# Patient Record
Sex: FEMALE | Race: WHITE | Employment: OTHER | URBAN - NONMETROPOLITAN AREA
[De-identification: names, ages, dates, MRNs, and addresses within clinical notes are randomized per-mention and may not be internally consistent; named-entity substitution may affect disease eponyms.]

---

## 2017-01-03 PROBLEM — Z80.0 FAMILY HISTORY OF COLON CANCER: Status: ACTIVE | Noted: 2017-01-03

## 2017-01-03 PROBLEM — Z12.11 COLON CANCER SCREENING: Status: ACTIVE | Noted: 2017-01-03

## 2017-10-10 ENCOUNTER — HOSPITAL ENCOUNTER (OUTPATIENT)
Age: 57
Discharge: HOME | End: 2017-10-10
Payer: COMMERCIAL

## 2017-10-10 PROCEDURE — 86003 ALLG SPEC IGE CRUDE XTRC EA: CPT

## 2017-10-10 PROCEDURE — 36415 COLL VENOUS BLD VENIPUNCTURE: CPT

## 2017-10-11 LAB
ALLERGEN HONEY BEE IGE: <0.34 KU/L (ref 0–0.34)
ALLERGEN WHITE-FACED HORNET IGE: 7.33 KU/L (ref 0–0.34)
COMMON WASP, YELLOW JACKET IGE: 14.2 KU/L (ref 0–0.34)
PAPER WASP IGE CLASS: 9.2 KU/L (ref 0–0.34)
YELLOW HORNET IGE: <0.34 KU/L (ref 0–0.34)

## 2017-10-22 ENCOUNTER — HOSPITAL ENCOUNTER (INPATIENT)
Age: 57
LOS: 1 days | Discharge: HOME | DRG: 638 | End: 2017-10-23
Attending: INTERNAL MEDICINE | Admitting: INTERNAL MEDICINE
Payer: COMMERCIAL

## 2017-10-22 DIAGNOSIS — I49.3 FREQUENT PVCS: Primary | ICD-10-CM

## 2017-10-22 PROBLEM — E10.10 DIABETIC KETOACIDOSIS WITHOUT COMA ASSOCIATED WITH TYPE 1 DIABETES MELLITUS (HCC): Status: ACTIVE | Noted: 2017-10-22

## 2017-10-22 PROBLEM — E86.0 DEHYDRATION: Status: ACTIVE | Noted: 2017-10-22

## 2017-10-22 LAB
-: ABNORMAL
ABSOLUTE EOS #: 0 K/UL (ref 0–0.4)
ABSOLUTE IMMATURE GRANULOCYTE: ABNORMAL K/UL (ref 0–0.3)
ABSOLUTE LYMPH #: 1.43 K/UL (ref 1–4.8)
ABSOLUTE MONO #: 2 K/UL (ref 0–1)
ALBUMIN SERPL-MCNC: 4.9 G/DL (ref 3.5–5.2)
ALBUMIN/GLOBULIN RATIO: 1.8 (ref 1–2.5)
ALLEN TEST: ABNORMAL
ALLEN TEST: ABNORMAL
ALP BLD-CCNC: 134 U/L (ref 35–104)
ALT SERPL-CCNC: 67 U/L (ref 5–33)
AMORPHOUS: ABNORMAL
AMYLASE: 24 U/L (ref 28–100)
ANION GAP SERPL CALCULATED.3IONS-SCNC: 25 MMOL/L (ref 9–17)
ANION GAP SERPL CALCULATED.3IONS-SCNC: 37 MMOL/L (ref 9–17)
ANION GAP SERPL CALCULATED.3IONS-SCNC: 40 MMOL/L (ref 9–17)
AST SERPL-CCNC: 89 U/L
BACTERIA: ABNORMAL
BASOPHILS # BLD: 1 %
BASOPHILS ABSOLUTE: 0.29 K/UL (ref 0–0.2)
BILIRUB SERPL-MCNC: 0.53 MG/DL (ref 0.3–1.2)
BILIRUBIN URINE: NEGATIVE
BUN BLDV-MCNC: 24 MG/DL (ref 6–20)
BUN BLDV-MCNC: 26 MG/DL (ref 6–20)
BUN BLDV-MCNC: 27 MG/DL (ref 6–20)
BUN/CREAT BLD: 26 (ref 9–20)
BUN/CREAT BLD: 27 (ref 9–20)
BUN/CREAT BLD: 29 (ref 9–20)
CALCIUM SERPL-MCNC: 8.7 MG/DL (ref 8.6–10.4)
CALCIUM SERPL-MCNC: 9.8 MG/DL (ref 8.6–10.4)
CALCIUM SERPL-MCNC: 9.9 MG/DL (ref 8.6–10.4)
CARBOXYHEMOGLOBIN: ABNORMAL % (ref 0–5)
CARBOXYHEMOGLOBIN: ABNORMAL % (ref 0–5)
CASTS UA: ABNORMAL /LPF
CHLORIDE BLD-SCNC: 100 MMOL/L (ref 98–107)
CHLORIDE BLD-SCNC: 89 MMOL/L (ref 98–107)
CHLORIDE BLD-SCNC: 91 MMOL/L (ref 98–107)
CO2: 12 MMOL/L (ref 20–31)
CO2: 7 MMOL/L (ref 20–31)
CO2: 9 MMOL/L (ref 20–31)
COLOR: YELLOW
COMMENT UA: ABNORMAL
CREAT SERPL-MCNC: 0.89 MG/DL (ref 0.5–0.9)
CREAT SERPL-MCNC: 0.92 MG/DL (ref 0.5–0.9)
CREAT SERPL-MCNC: 1.01 MG/DL (ref 0.5–0.9)
CRYSTALS, UA: ABNORMAL /HPF
DIFFERENTIAL TYPE: ABNORMAL
EKG ATRIAL RATE: 111 BPM
EKG P AXIS: 75 DEGREES
EKG P-R INTERVAL: 132 MS
EKG Q-T INTERVAL: 348 MS
EKG QRS DURATION: 86 MS
EKG QTC CALCULATION (BAZETT): 473 MS
EKG R AXIS: 40 DEGREES
EKG T AXIS: 64 DEGREES
EKG VENTRICULAR RATE: 111 BPM
EOSINOPHILS RELATIVE PERCENT: 0 %
EPITHELIAL CELLS UA: ABNORMAL /HPF (ref 0–25)
ESTIMATED AVERAGE GLUCOSE: 246 MG/DL
FIO2: ABNORMAL
FIO2: ABNORMAL
GFR AFRICAN AMERICAN: >60 ML/MIN
GFR NON-AFRICAN AMERICAN: 57 ML/MIN
GFR NON-AFRICAN AMERICAN: >60 ML/MIN
GFR NON-AFRICAN AMERICAN: >60 ML/MIN
GFR SERPL CREATININE-BSD FRML MDRD: ABNORMAL ML/MIN/{1.73_M2}
GLUCOSE BLD-MCNC: 139 MG/DL (ref 74–100)
GLUCOSE BLD-MCNC: 189 MG/DL (ref 70–99)
GLUCOSE BLD-MCNC: 190 MG/DL (ref 74–100)
GLUCOSE BLD-MCNC: 196 MG/DL (ref 74–100)
GLUCOSE BLD-MCNC: 238 MG/DL (ref 74–100)
GLUCOSE BLD-MCNC: 280 MG/DL (ref 74–100)
GLUCOSE BLD-MCNC: 354 MG/DL (ref 74–100)
GLUCOSE BLD-MCNC: 448 MG/DL (ref 70–99)
GLUCOSE BLD-MCNC: 463 MG/DL (ref 70–99)
GLUCOSE URINE: ABNORMAL
HBA1C MFR BLD: 10.2 % (ref 4.8–5.9)
HCO3 VENOUS: 10.1 MMOL/L (ref 24–30)
HCO3 VENOUS: 8.1 MMOL/L (ref 24–30)
HCT VFR BLD CALC: 42.9 % (ref 36–46)
HEMOGLOBIN: 13.9 G/DL (ref 12–16)
IMMATURE GRANULOCYTES: ABNORMAL %
KETONES, URINE: ABNORMAL
LACTIC ACID, WHOLE BLOOD: ABNORMAL MMOL/L (ref 0.7–2.1)
LACTIC ACID: 5.4 MMOL/L (ref 0.5–2.2)
LEUKOCYTE ESTERASE, URINE: NEGATIVE
LIPASE: 13 U/L (ref 13–60)
LYMPHOCYTES # BLD: 5 %
MAGNESIUM: 2.1 MG/DL (ref 1.6–2.6)
MAGNESIUM: 2.4 MG/DL (ref 1.6–2.6)
MCH RBC QN AUTO: 31.4 PG (ref 26–34)
MCHC RBC AUTO-ENTMCNC: 32.4 G/DL (ref 31–37)
MCV RBC AUTO: 96.9 FL (ref 80–100)
METHEMOGLOBIN: ABNORMAL % (ref 0–1.9)
METHEMOGLOBIN: ABNORMAL % (ref 0–1.9)
MODE: ABNORMAL
MODE: ABNORMAL
MONOCYTES # BLD: 7 %
MORPHOLOGY: ABNORMAL
MUCUS: ABNORMAL
NEGATIVE BASE EXCESS, VEN: 16.8 MMOL/L (ref 0–2)
NEGATIVE BASE EXCESS, VEN: 22 MMOL/L (ref 0–2)
NITRITE, URINE: NEGATIVE
NOTIFICATION TIME: ABNORMAL
NOTIFICATION TIME: ABNORMAL
NOTIFICATION: ABNORMAL
NOTIFICATION: ABNORMAL
O2 DEVICE/FLOW/%: ABNORMAL
O2 DEVICE/FLOW/%: ABNORMAL
O2 SAT, VEN: 63.7 % (ref 60–85)
O2 SAT, VEN: 68.9 % (ref 60–85)
OTHER OBSERVATIONS UA: ABNORMAL
OXYHEMOGLOBIN: ABNORMAL % (ref 95–98)
OXYHEMOGLOBIN: ABNORMAL % (ref 95–98)
PATIENT TEMP: 37
PATIENT TEMP: 37
PCO2, VEN, TEMP ADJ: ABNORMAL MMHG (ref 39–55)
PCO2, VEN, TEMP ADJ: ABNORMAL MMHG (ref 39–55)
PCO2, VEN: 27.8 (ref 39–55)
PCO2, VEN: 32.2 (ref 39–55)
PDW BLD-RTO: 13.5 % (ref 12.1–15.2)
PEEP/CPAP: ABNORMAL
PEEP/CPAP: ABNORMAL
PH UA: 5.5 (ref 5–9)
PH VENOUS: 7.02 (ref 7.32–7.42)
PH VENOUS: 7.18 (ref 7.32–7.42)
PH, VEN, TEMP ADJ: ABNORMAL (ref 7.32–7.42)
PH, VEN, TEMP ADJ: ABNORMAL (ref 7.32–7.42)
PHOSPHORUS: 2.1 MG/DL (ref 2.6–4.5)
PHOSPHORUS: 7.1 MG/DL (ref 2.6–4.5)
PLATELET # BLD: 505 K/UL (ref 140–450)
PLATELET ESTIMATE: ABNORMAL
PMV BLD AUTO: 8.3 FL (ref 6–12)
PO2, VEN, TEMP ADJ: ABNORMAL MMHG (ref 30–50)
PO2, VEN, TEMP ADJ: ABNORMAL MMHG (ref 30–50)
PO2, VEN: 43.8 (ref 30–50)
PO2, VEN: 47.3 (ref 30–50)
POSITIVE BASE EXCESS, VEN: ABNORMAL MMOL/L (ref 0–2)
POSITIVE BASE EXCESS, VEN: ABNORMAL MMOL/L (ref 0–2)
POTASSIUM SERPL-SCNC: 5 MMOL/L (ref 3.7–5.3)
POTASSIUM SERPL-SCNC: 5.8 MMOL/L (ref 3.7–5.3)
POTASSIUM SERPL-SCNC: 5.8 MMOL/L (ref 3.7–5.3)
PROTEIN UA: ABNORMAL
PSV: ABNORMAL
PSV: ABNORMAL
PT. POSITION: ABNORMAL
PT. POSITION: ABNORMAL
RBC # BLD: 4.43 M/UL (ref 4–5.2)
RBC # BLD: ABNORMAL 10*6/UL
RBC UA: ABNORMAL /HPF (ref 0–2)
RENAL EPITHELIAL, UA: ABNORMAL /HPF
RESPIRATORY RATE: 24
RESPIRATORY RATE: ABNORMAL
SAMPLE SITE: ABNORMAL
SAMPLE SITE: ABNORMAL
SEG NEUTROPHILS: 87 %
SEGMENTED NEUTROPHILS ABSOLUTE COUNT: 24.88 K/UL (ref 1.8–7.7)
SET RATE: ABNORMAL
SET RATE: ABNORMAL
SODIUM BLD-SCNC: 136 MMOL/L (ref 135–144)
SODIUM BLD-SCNC: 137 MMOL/L (ref 135–144)
SODIUM BLD-SCNC: 137 MMOL/L (ref 135–144)
SPECIFIC GRAVITY UA: >1.03 (ref 1.01–1.02)
TEXT FOR RESPIRATORY: ABNORMAL
TEXT FOR RESPIRATORY: ABNORMAL
TOTAL HB: ABNORMAL G/DL (ref 12–16)
TOTAL HB: ABNORMAL G/DL (ref 12–16)
TOTAL PROTEIN: 7.7 G/DL (ref 6.4–8.3)
TOTAL RATE: ABNORMAL
TOTAL RATE: ABNORMAL
TRICHOMONAS: ABNORMAL
TURBIDITY: CLEAR
URINE HGB: ABNORMAL
UROBILINOGEN, URINE: NORMAL
VT: ABNORMAL
VT: ABNORMAL
WBC # BLD: 28.6 K/UL (ref 3.5–11)
WBC # BLD: ABNORMAL 10*3/UL
WBC UA: ABNORMAL /HPF (ref 0–5)
YEAST: ABNORMAL

## 2017-10-22 PROCEDURE — 83605 ASSAY OF LACTIC ACID: CPT

## 2017-10-22 PROCEDURE — 82805 BLOOD GASES W/O2 SATURATION: CPT

## 2017-10-22 PROCEDURE — 2500000003 HC RX 250 WO HCPCS: Performed by: INTERNAL MEDICINE

## 2017-10-22 PROCEDURE — 83735 ASSAY OF MAGNESIUM: CPT

## 2017-10-22 PROCEDURE — 84100 ASSAY OF PHOSPHORUS: CPT

## 2017-10-22 PROCEDURE — 82947 ASSAY GLUCOSE BLOOD QUANT: CPT

## 2017-10-22 PROCEDURE — 6370000000 HC RX 637 (ALT 250 FOR IP): Performed by: INTERNAL MEDICINE

## 2017-10-22 PROCEDURE — 36415 COLL VENOUS BLD VENIPUNCTURE: CPT

## 2017-10-22 PROCEDURE — 81001 URINALYSIS AUTO W/SCOPE: CPT

## 2017-10-22 PROCEDURE — 83690 ASSAY OF LIPASE: CPT

## 2017-10-22 PROCEDURE — 6360000002 HC RX W HCPCS: Performed by: INTERNAL MEDICINE

## 2017-10-22 PROCEDURE — 80053 COMPREHEN METABOLIC PANEL: CPT

## 2017-10-22 PROCEDURE — 87040 BLOOD CULTURE FOR BACTERIA: CPT

## 2017-10-22 PROCEDURE — 82150 ASSAY OF AMYLASE: CPT

## 2017-10-22 PROCEDURE — 80048 BASIC METABOLIC PNL TOTAL CA: CPT

## 2017-10-22 PROCEDURE — 93005 ELECTROCARDIOGRAM TRACING: CPT

## 2017-10-22 PROCEDURE — 83036 HEMOGLOBIN GLYCOSYLATED A1C: CPT

## 2017-10-22 PROCEDURE — 2000000000 HC ICU R&B

## 2017-10-22 PROCEDURE — 85025 COMPLETE CBC W/AUTO DIFF WBC: CPT

## 2017-10-22 PROCEDURE — 2580000003 HC RX 258: Performed by: INTERNAL MEDICINE

## 2017-10-22 RX ORDER — SODIUM PHOSPHATE, DIBASIC AND SODIUM PHOSPHATE, MONOBASIC 3.5; 9.5 G/66ML; G/66ML
ENEMA RECTAL
Status: DISCONTINUED
Start: 2017-10-22 | End: 2017-10-22 | Stop reason: WASHOUT

## 2017-10-22 RX ORDER — 0.9 % SODIUM CHLORIDE 0.9 %
15 INTRAVENOUS SOLUTION INTRAVENOUS ONCE
Status: COMPLETED | OUTPATIENT
Start: 2017-10-22 | End: 2017-10-22

## 2017-10-22 RX ORDER — DEXTROSE, SODIUM CHLORIDE, AND POTASSIUM CHLORIDE 5; .45; .15 G/100ML; G/100ML; G/100ML
INJECTION INTRAVENOUS CONTINUOUS PRN
Status: DISCONTINUED | OUTPATIENT
Start: 2017-10-22 | End: 2017-10-24 | Stop reason: HOSPADM

## 2017-10-22 RX ORDER — SODIUM CHLORIDE 9 MG/ML
INJECTION, SOLUTION INTRAVENOUS CONTINUOUS
Status: DISCONTINUED | OUTPATIENT
Start: 2017-10-22 | End: 2017-10-23

## 2017-10-22 RX ORDER — DEXTROSE MONOHYDRATE 25 G/50ML
12.5 INJECTION, SOLUTION INTRAVENOUS PRN
Status: DISCONTINUED | OUTPATIENT
Start: 2017-10-22 | End: 2017-10-24 | Stop reason: HOSPADM

## 2017-10-22 RX ORDER — ONDANSETRON 2 MG/ML
4 INJECTION INTRAMUSCULAR; INTRAVENOUS EVERY 6 HOURS PRN
Status: DISCONTINUED | OUTPATIENT
Start: 2017-10-22 | End: 2017-10-24 | Stop reason: HOSPADM

## 2017-10-22 RX ORDER — SODIUM CHLORIDE 9 MG/ML
INJECTION, SOLUTION INTRAVENOUS CONTINUOUS
Status: DISCONTINUED | OUTPATIENT
Start: 2017-10-22 | End: 2017-10-22

## 2017-10-22 RX ORDER — ATORVASTATIN CALCIUM 10 MG/1
10 TABLET, FILM COATED ORAL DAILY
Status: DISCONTINUED | OUTPATIENT
Start: 2017-10-22 | End: 2017-10-24 | Stop reason: HOSPADM

## 2017-10-22 RX ORDER — ZOLPIDEM TARTRATE 5 MG/1
5 TABLET ORAL NIGHTLY PRN
Status: DISCONTINUED | OUTPATIENT
Start: 2017-10-22 | End: 2017-10-24 | Stop reason: HOSPADM

## 2017-10-22 RX ORDER — MAGNESIUM SULFATE 1 G/100ML
1 INJECTION INTRAVENOUS PRN
Status: DISCONTINUED | OUTPATIENT
Start: 2017-10-22 | End: 2017-10-24 | Stop reason: HOSPADM

## 2017-10-22 RX ORDER — POTASSIUM CHLORIDE 7.45 MG/ML
10 INJECTION INTRAVENOUS PRN
Status: DISCONTINUED | OUTPATIENT
Start: 2017-10-22 | End: 2017-10-24

## 2017-10-22 RX ORDER — ACETAMINOPHEN 500 MG
500 TABLET ORAL EVERY 6 HOURS PRN
Status: DISCONTINUED | OUTPATIENT
Start: 2017-10-22 | End: 2017-10-24 | Stop reason: HOSPADM

## 2017-10-22 RX ORDER — SODIUM CHLORIDE 9 MG/ML
INJECTION, SOLUTION INTRAVENOUS ONCE
Status: COMPLETED | OUTPATIENT
Start: 2017-10-22 | End: 2017-10-22

## 2017-10-22 RX ORDER — ENALAPRIL MALEATE 2.5 MG/1
2.5 TABLET ORAL DAILY
Status: DISCONTINUED | OUTPATIENT
Start: 2017-10-22 | End: 2017-10-24 | Stop reason: HOSPADM

## 2017-10-22 RX ADMIN — SODIUM CHLORIDE 953 ML: 9 INJECTION, SOLUTION INTRAVENOUS at 17:30

## 2017-10-22 RX ADMIN — ATORVASTATIN CALCIUM 10 MG: 10 TABLET, FILM COATED ORAL at 21:12

## 2017-10-22 RX ADMIN — INSULIN HUMAN 15 UNITS: 100 INJECTION, SOLUTION PARENTERAL at 16:33

## 2017-10-22 RX ADMIN — SODIUM CHLORIDE: 9 INJECTION, SOLUTION INTRAVENOUS at 16:10

## 2017-10-22 RX ADMIN — ENOXAPARIN SODIUM 40 MG: 40 INJECTION SUBCUTANEOUS at 18:25

## 2017-10-22 RX ADMIN — ZOLPIDEM TARTRATE 5 MG: 5 TABLET, COATED ORAL at 21:12

## 2017-10-22 RX ADMIN — ACETAMINOPHEN 500 MG: 500 TABLET ORAL at 19:11

## 2017-10-22 RX ADMIN — SODIUM CHLORIDE: 9 INJECTION, SOLUTION INTRAVENOUS at 16:30

## 2017-10-22 RX ADMIN — POTASSIUM CHLORIDE, DEXTROSE MONOHYDRATE AND SODIUM CHLORIDE: 150; 5; 450 INJECTION, SOLUTION INTRAVENOUS at 21:12

## 2017-10-22 RX ADMIN — SODIUM PHOSPHATE, MONOBASIC, MONOHYDRATE 15 MMOL: 276; 142 INJECTION, SOLUTION INTRAVENOUS at 21:50

## 2017-10-22 RX ADMIN — SODIUM CHLORIDE 6.4 UNITS/HR: 9 INJECTION, SOLUTION INTRAVENOUS at 17:00

## 2017-10-22 RX ADMIN — ENALAPRIL MALEATE 2.5 MG: 2.5 TABLET ORAL at 21:12

## 2017-10-22 RX ADMIN — SODIUM CHLORIDE: 9 INJECTION, SOLUTION INTRAVENOUS at 18:37

## 2017-10-22 ASSESSMENT — PAIN DESCRIPTION - LOCATION: LOCATION: HEAD

## 2017-10-22 ASSESSMENT — PAIN DESCRIPTION - DESCRIPTORS: DESCRIPTORS: ACHING

## 2017-10-22 ASSESSMENT — PAIN DESCRIPTION - PAIN TYPE: TYPE: ACUTE PAIN

## 2017-10-22 ASSESSMENT — PAIN SCALES - GENERAL
PAINLEVEL_OUTOF10: 10
PAINLEVEL_OUTOF10: 10

## 2017-10-22 NOTE — PLAN OF CARE
Problem: Falls - Risk of  Goal: Absence of falls  Outcome: Ongoing  Patient is alert and oriented and has demonstrated the use of using the call light for assistance before getting up. Education has been provided to defer the use of the bed alarm and/or restraint free alarm as the patient has shown competency in calling for assistance and verbalizing the risk. Problem: Fluid Volume - Imbalance:  Goal: Will remain free of signs and symptoms of dehydration  Will remain free of signs and symptoms of dehydration   Outcome: Ongoing  Normal saline infusing at 250 ml/hr. Will continue to monitor for dehydration. Problem: Serum Glucose Level - Abnormal:  Goal: Ability to maintain appropriate glucose levels will improve  Ability to maintain appropriate glucose levels will improve   Outcome: Ongoing  Glucose monitoring every hours, insuline drip infusing per Dr. Ramy Jaffe. Problem: Daily Care:  Goal: Daily care needs are met  Daily care needs are met   Outcome: Ongoing  Daily cares assessed and needs met according to patient condition. Patient reminded to ask for help when needed. Problem: Pain:  Goal: Patient's pain/discomfort is manageable  Patient's pain/discomfort is manageable   Outcome: Ongoing  Assess pain every 4 hours and prn using a 0-10 scale. Teach patient adverse complication of uncontrolled pain. Plan patients day so that aggravating activities coincide with peak of analgesic. Patients should be medicated before procedures and activities that incite pain to prevent spikes in pain. Provide patient with distractions of choice such as TV, music or reading. Discuss with patient what a tolerable pain rating would be and work towards that and not just eliminating all pain.

## 2017-10-22 NOTE — PROGRESS NOTES
Received to floor per wheelchair around 1600. Transfers to bed with SBA. Reports nausea. Is responsive but drowsy in appearance. Assisted to change into gown. Orders completed as per Dr Alexandre Crew. Resting quietly in bed at this time. Reports feeling much better.

## 2017-10-22 NOTE — H&P
Hospital Medicine  History and Physical    Patient:  Steffi Ferrari  MRN: [id number]    Chief Complaint:  Vomiting, weak    History Obtained From:  patient  PCP: Mary Crews MD    History of Present Illness: The patient is a 65 y.o. female who presents with a history of type 1 DM and user of an insulin pump. This AM developed N and V and was unable to keep anything down. Developed hyperglycemia, SOB, HA . Denies other sx's. Seen and admitted with DKA. Past Medical History:        Diagnosis Date    Diabetes mellitus ([address]     PONV (postoperative nausea and vomiting)        Past Surgical History:        Procedure Laterality Date    COLONOSCOPY  01/11/2017    -polyp(adenomatous polyp)    EYE SURGERY Bilateral 2007    ROTATOR CUFF REPAIR Left 2009    TUBAL LIGATION  1993       Medications Prior to Admission:    Prior to Admission medications    Medication Sig Start Date End Date Taking? Authorizing Provider   amphetamine-dextroamphetamine (ADDERALL XR) 30 MG extended release capsule 1 tab at 6 am, 1 tab at noon. Earliest Fill Date: 10/3/17 10/3/17  Yes Mary Crews MD   insulin lispro (HUMALOG) 100 UNIT/ML injection vial Inject 100 Units into the skin daily Approximately 100 units via insulin pump daily   Yes Historical Provider, MD   enalapril (VASOTEC) 2.5 MG tablet Take 1 tablet by mouth daily 4/25/17  Yes Mary Crews MD   atorvastatin (LIPITOR) 10 MG tablet Take 1 tablet by mouth daily 4/25/17  Yes Mary Crews MD   cyclobenzaprine (FLEXERIL) 10 MG tablet Take 1 tablet by mouth 3 times daily as needed for Muscle spasms 2/15/17  Yes Mary Crews MD       Allergies:  Sulfa antibiotics    Social History:   TOBACCO:   reports that she has quit smoking. She does not have any smokeless tobacco history on file. ETOH:   reports that she drinks alcohol. OCCUPATION: retired    Family History:   No family history on file.     Review of Systems:  Constitutional: positive for anorexia, chills, fatigue, malaise and weight loss, negative for fevers and sweats  Eyes: has treated retinopathy  Ears, nose, mouth, throat, and face: negative  Respiratory: positive for shortness of breath, negative for asthma, chronic bronchitis, cough, dyspnea on exertion, emphysema, hemoptysis, pleurisy/chest pain, pneumonia, sputum, stridor and wheezing  Cardiovascular: negative for chest pain, chest pressure/discomfort, claudication, dyspnea, exertional chest pressure/discomfort, fatigue, irregular heart beat, lower extremity edema, near-syncope, orthopnea, palpitations, paroxysmal nocturnal dyspnea, syncope and tachypnea  Gastrointestinal: positive for constipation, nausea and vomiting, negative for abdominal pain, change in bowel habits, diarrhea, dyspepsia, dysphagia, jaundice, melena, odynophagia and reflux symptoms  Genitourinary:negative for decreased stream, dysuria, frequency, hematuria, hesitancy, nocturia and urinary incontinence  Integument/breast: negative for changed mole, dryness, pruritus, rash, skin color change and skin lesion(s)  Hematologic/lymphatic: negative for bleeding, easy bruising, lymphadenopathy and petechiae  Musculoskeletal:negative for arthralgias, back pain, bone pain, muscle weakness, myalgias, neck pain and stiff joints  Neurological: negative  Behavioral/Psych: negative  All other systems were reviewed with the patient and are negative except as stated  Physical Exam:    Vitals: There were no vitals filed for this visit. Weight: 140 lb (63.5 kg)   Height: 5' 5\" (165.1 cm)   GEN:   A & O x3, mild distress  EYES: No gross abnormalities.   NECK: normal, supple, no lymphadenopathy,  no carotid bruits  PULM: clear to auscultation bilaterally- no wheezes, rales or rhonchi, normal air movement, no respiratory distress  COR: regular rate & rhythm and tachycardia  ABD:  soft, non-tender, non-distended, normal bowel sounds, no masses or organomegaly  EXT:   no cyanosis, clubbing or edema present NEURO: negative  SKIN:  no rashes or significant lesions      -----------------------------------------------------------------  Diagnostic Data: Reviewed    Assessment:      Hospital Problems:  Principal Problem:    Diabetic ketoacidosis without coma associated with type 1 diabetes mellitus ([facility]  Active Problems:    Dehydration      All Problems:  Patient Active Problem List   Diagnosis    Colon cancer screening    Family history of colon cancer    Colon polyp    Diabetic ketoacidosis without coma associated with type 1 diabetes mellitus ([facility]    Dehydration           Plan:       · This patient requires inpatient admission because of DKA   · Factors affecting the medical complexity of this patient include Type 1 DM  · Estimated length of stay is 3 days  · IV fluids, continuous insulin, monitor and correct lytes  ·   High risk medications: IV insulin    Merline Havers, MD  CORE MEASURES  · DVT prophylaxis: Lovenox  · Decubitus ulcer present on admission: No  · CODE STATUS: FULL CODE  · Nutrition Status: good   · Physical therapy: No   · Old Charts reviewed: Yes  · EKG Reviewed: Yes  · Advance Directive Addressed:  \"Yes - in chart    Merline Havers , M.D.

## 2017-10-23 ENCOUNTER — APPOINTMENT (OUTPATIENT)
Dept: GENERAL RADIOLOGY | Age: 57
DRG: 638 | End: 2017-10-23
Attending: INTERNAL MEDICINE
Payer: COMMERCIAL

## 2017-10-23 PROBLEM — E44.1 MILD MALNUTRITION (HCC): Status: ACTIVE | Noted: 2017-10-23

## 2017-10-23 LAB
ABSOLUTE EOS #: 0 K/UL (ref 0–0.4)
ABSOLUTE IMMATURE GRANULOCYTE: ABNORMAL K/UL (ref 0–0.3)
ABSOLUTE LYMPH #: 2.37 K/UL (ref 1–4.8)
ABSOLUTE MONO #: 1.46 K/UL (ref 0–1)
ALBUMIN SERPL-MCNC: 3.4 G/DL (ref 3.5–5.2)
ALBUMIN SERPL-MCNC: 3.6 G/DL (ref 3.5–5.2)
ALBUMIN SERPL-MCNC: 3.7 G/DL (ref 3.5–5.2)
ALBUMIN/GLOBULIN RATIO: 1.7 (ref 1–2.5)
ALBUMIN/GLOBULIN RATIO: 1.9 (ref 1–2.5)
ALBUMIN/GLOBULIN RATIO: 1.9 (ref 1–2.5)
ALLEN TEST: ABNORMAL
ALP BLD-CCNC: 81 U/L (ref 35–104)
ALP BLD-CCNC: 83 U/L (ref 35–104)
ALP BLD-CCNC: 94 U/L (ref 35–104)
ALT SERPL-CCNC: 38 U/L (ref 5–33)
ALT SERPL-CCNC: 39 U/L (ref 5–33)
ALT SERPL-CCNC: 43 U/L (ref 5–33)
ANION GAP SERPL CALCULATED.3IONS-SCNC: 13 MMOL/L (ref 9–17)
ANION GAP SERPL CALCULATED.3IONS-SCNC: 18 MMOL/L
ANION GAP SERPL CALCULATED.3IONS-SCNC: 18 MMOL/L
ANION GAP SERPL CALCULATED.3IONS-SCNC: 18 MMOL/L (ref 9–17)
ANION GAP SERPL CALCULATED.3IONS-SCNC: 21 MMOL/L (ref 9–17)
ANION GAP SERPL CALCULATED.3IONS-SCNC: 3 MMOL/L (ref 9–17)
AST SERPL-CCNC: 33 U/L
AST SERPL-CCNC: 40 U/L
AST SERPL-CCNC: 41 U/L
BASOPHILS # BLD: 0 %
BASOPHILS ABSOLUTE: 0 K/UL (ref 0–0.2)
BILIRUB SERPL-MCNC: 0.47 MG/DL (ref 0.3–1.2)
BILIRUB SERPL-MCNC: 0.56 MG/DL (ref 0.3–1.2)
BILIRUB SERPL-MCNC: 0.66 MG/DL (ref 0.3–1.2)
BUN BLDV-MCNC: 17 MG/DL (ref 6–20)
BUN BLDV-MCNC: 19 MG/DL (ref 6–20)
BUN BLDV-MCNC: 19 MG/DL (ref 6–20)
BUN BLDV-MCNC: 21 MG/DL (ref 6–20)
BUN BLDV-MCNC: 21 MG/DL (ref 6–20)
BUN BLDV-MCNC: 22 MG/DL (ref 6–20)
BUN/CREAT BLD: 18 (ref 9–20)
BUN/CREAT BLD: 24 (ref 9–20)
BUN/CREAT BLD: 24 (ref 9–20)
BUN/CREAT BLD: 27 (ref 9–20)
BUN/CREAT BLD: 29 (ref 9–20)
BUN/CREAT BLD: 29 (ref 9–20)
CALCIUM SERPL-MCNC: 8.1 MG/DL (ref 8.6–10.4)
CALCIUM SERPL-MCNC: 8.3 MG/DL (ref 8.6–10.4)
CALCIUM SERPL-MCNC: 8.4 MG/DL (ref 8.6–10.4)
CALCIUM SERPL-MCNC: 8.5 MG/DL (ref 8.6–10.4)
CALCIUM SERPL-MCNC: 8.7 MG/DL (ref 8.6–10.4)
CALCIUM SERPL-MCNC: 8.9 MG/DL (ref 8.6–10.4)
CARBOXYHEMOGLOBIN: ABNORMAL % (ref 0–5)
CHLORIDE BLD-SCNC: 101 MMOL/L (ref 98–107)
CHLORIDE BLD-SCNC: 103 MMOL/L (ref 98–107)
CHLORIDE BLD-SCNC: 103 MMOL/L (ref 98–107)
CHLORIDE BLD-SCNC: 99 MMOL/L (ref 98–107)
CO2: 12 MMOL/L (ref 20–31)
CO2: 15 MMOL/L (ref 20–31)
CO2: 16 MMOL/L (ref 20–31)
CO2: 16 MMOL/L (ref 20–31)
CO2: 19 MMOL/L (ref 20–31)
CO2: 30 MMOL/L (ref 20–31)
CREAT SERPL-MCNC: 0.66 MG/DL (ref 0.5–0.9)
CREAT SERPL-MCNC: 0.73 MG/DL (ref 0.5–0.9)
CREAT SERPL-MCNC: 0.79 MG/DL (ref 0.5–0.9)
CREAT SERPL-MCNC: 0.83 MG/DL (ref 0.5–0.9)
CREAT SERPL-MCNC: 0.87 MG/DL (ref 0.5–0.9)
CREAT SERPL-MCNC: 0.92 MG/DL (ref 0.5–0.9)
DIFFERENTIAL TYPE: ABNORMAL
EOSINOPHILS RELATIVE PERCENT: 0 %
FIO2: ABNORMAL
GFR AFRICAN AMERICAN: >60 ML/MIN
GFR NON-AFRICAN AMERICAN: >60 ML/MIN
GFR SERPL CREATININE-BSD FRML MDRD: ABNORMAL ML/MIN/{1.73_M2}
GLUCOSE BLD-MCNC: 131 MG/DL (ref 74–100)
GLUCOSE BLD-MCNC: 170 MG/DL (ref 74–100)
GLUCOSE BLD-MCNC: 181 MG/DL (ref 74–100)
GLUCOSE BLD-MCNC: 187 MG/DL (ref 70–99)
GLUCOSE BLD-MCNC: 200 MG/DL (ref 74–100)
GLUCOSE BLD-MCNC: 210 MG/DL (ref 70–99)
GLUCOSE BLD-MCNC: 213 MG/DL (ref 74–100)
GLUCOSE BLD-MCNC: 215 MG/DL (ref 74–100)
GLUCOSE BLD-MCNC: 216 MG/DL (ref 74–100)
GLUCOSE BLD-MCNC: 220 MG/DL (ref 70–99)
GLUCOSE BLD-MCNC: 227 MG/DL (ref 74–100)
GLUCOSE BLD-MCNC: 230 MG/DL (ref 74–100)
GLUCOSE BLD-MCNC: 242 MG/DL (ref 74–100)
GLUCOSE BLD-MCNC: 293 MG/DL (ref 74–100)
GLUCOSE BLD-MCNC: 323 MG/DL (ref 74–100)
GLUCOSE BLD-MCNC: 366 MG/DL (ref 70–99)
GLUCOSE BLD-MCNC: 378 MG/DL (ref 70–99)
GLUCOSE BLD-MCNC: 394 MG/DL (ref 70–99)
GLUCOSE BLD-MCNC: 68 MG/DL (ref 74–100)
GLUCOSE BLD-MCNC: 91 MG/DL (ref 74–100)
HCO3 VENOUS: 15.5 MMOL/L (ref 24–30)
HCO3 VENOUS: 16.3 MMOL/L (ref 24–30)
HCO3 VENOUS: 16.6 MMOL/L (ref 24–30)
HCO3 VENOUS: 17.5 MMOL/L (ref 24–30)
HCO3 VENOUS: 19.2 MMOL/L (ref 24–30)
HCO3 VENOUS: 21.2 MMOL/L (ref 24–30)
HCT VFR BLD CALC: 34.3 % (ref 36–46)
HCT VFR BLD CALC: 35.5 % (ref 36–46)
HCT VFR BLD CALC: 36.1 % (ref 36–46)
HEMOGLOBIN: 11.6 G/DL (ref 12–16)
HEMOGLOBIN: 11.9 G/DL (ref 12–16)
HEMOGLOBIN: 12.2 G/DL (ref 12–16)
IMMATURE GRANULOCYTES: ABNORMAL %
LYMPHOCYTES # BLD: 13 %
MAGNESIUM: 2.1 MG/DL (ref 1.6–2.6)
MCH RBC QN AUTO: 31.8 PG (ref 26–34)
MCH RBC QN AUTO: 31.8 PG (ref 26–34)
MCH RBC QN AUTO: 32 PG (ref 26–34)
MCHC RBC AUTO-ENTMCNC: 33.5 G/DL (ref 31–37)
MCHC RBC AUTO-ENTMCNC: 33.8 G/DL (ref 31–37)
MCHC RBC AUTO-ENTMCNC: 33.9 G/DL (ref 31–37)
MCV RBC AUTO: 94.2 FL (ref 80–100)
MCV RBC AUTO: 94.3 FL (ref 80–100)
MCV RBC AUTO: 95.1 FL (ref 80–100)
METHEMOGLOBIN: ABNORMAL % (ref 0–1.9)
MODE: ABNORMAL
MONOCYTES # BLD: 8 %
MORPHOLOGY: ABNORMAL
NEGATIVE BASE EXCESS, VEN: 2.4 MMOL/L (ref 0–2)
NEGATIVE BASE EXCESS, VEN: 5.9 MMOL/L (ref 0–2)
NEGATIVE BASE EXCESS, VEN: 7 MMOL/L (ref 0–2)
NEGATIVE BASE EXCESS, VEN: 7.6 MMOL/L (ref 0–2)
NEGATIVE BASE EXCESS, VEN: 8 MMOL/L (ref 0–2)
NEGATIVE BASE EXCESS, VEN: 9.2 MMOL/L (ref 0–2)
NOTIFICATION TIME: ABNORMAL
NOTIFICATION: ABNORMAL
O2 DEVICE/FLOW/%: ABNORMAL
O2 SAT, VEN: 68.2 % (ref 60–85)
O2 SAT, VEN: 69.6 % (ref 60–85)
O2 SAT, VEN: 70.8 % (ref 60–85)
O2 SAT, VEN: 81 % (ref 60–85)
O2 SAT, VEN: 85.1 % (ref 60–85)
O2 SAT, VEN: 95.3 % (ref 60–85)
OXYHEMOGLOBIN: ABNORMAL % (ref 95–98)
PATIENT TEMP: 37
PCO2, VEN, TEMP ADJ: ABNORMAL MMHG (ref 39–55)
PCO2, VEN: 30.4 (ref 39–55)
PCO2, VEN: 30.7 (ref 39–55)
PCO2, VEN: 30.7 (ref 39–55)
PCO2, VEN: 32.8 (ref 39–55)
PCO2, VEN: 33.8 (ref 39–55)
PCO2, VEN: 36.4 (ref 39–55)
PDW BLD-RTO: 13.1 % (ref 12.1–15.2)
PDW BLD-RTO: 13.4 % (ref 12.1–15.2)
PDW BLD-RTO: 13.5 % (ref 12.1–15.2)
PEEP/CPAP: ABNORMAL
PH VENOUS: 7.32 (ref 7.32–7.42)
PH VENOUS: 7.34 (ref 7.32–7.42)
PH VENOUS: 7.35 (ref 7.32–7.42)
PH VENOUS: 7.42 (ref 7.32–7.42)
PH, VEN, TEMP ADJ: ABNORMAL (ref 7.32–7.42)
PHOSPHORUS: 2.5 MG/DL (ref 2.6–4.5)
PHOSPHORUS: 2.6 MG/DL (ref 2.6–4.5)
PHOSPHORUS: 2.8 MG/DL (ref 2.6–4.5)
PHOSPHORUS: 2.9 MG/DL (ref 2.6–4.5)
PLATELET # BLD: 336 K/UL (ref 140–450)
PLATELET # BLD: 339 K/UL (ref 140–450)
PLATELET # BLD: 376 K/UL (ref 140–450)
PLATELET ESTIMATE: ABNORMAL
PMV BLD AUTO: 7.3 FL (ref 6–12)
PMV BLD AUTO: 7.6 FL (ref 6–12)
PMV BLD AUTO: 7.8 FL (ref 6–12)
PO2, VEN, TEMP ADJ: ABNORMAL MMHG (ref 30–50)
PO2, VEN: 35.5 (ref 30–50)
PO2, VEN: 37.6 (ref 30–50)
PO2, VEN: 39 (ref 30–50)
PO2, VEN: 46.2 (ref 30–50)
PO2, VEN: 52.7 (ref 30–50)
PO2, VEN: 79.5 (ref 30–50)
POSITIVE BASE EXCESS, VEN: ABNORMAL MMOL/L (ref 0–2)
POTASSIUM SERPL-SCNC: 4.1 MMOL/L (ref 3.7–5.3)
POTASSIUM SERPL-SCNC: 4.4 MMOL/L (ref 3.7–5.3)
POTASSIUM SERPL-SCNC: 4.8 MMOL/L (ref 3.7–5.3)
POTASSIUM SERPL-SCNC: 4.9 MMOL/L (ref 3.7–5.3)
POTASSIUM SERPL-SCNC: 4.9 MMOL/L (ref 3.7–5.3)
POTASSIUM SERPL-SCNC: 5.3 MMOL/L (ref 3.7–5.3)
PSV: ABNORMAL
PT. POSITION: ABNORMAL
RBC # BLD: 3.64 M/UL (ref 4–5.2)
RBC # BLD: 3.73 M/UL (ref 4–5.2)
RBC # BLD: 3.83 M/UL (ref 4–5.2)
RBC # BLD: ABNORMAL 10*6/UL
RESPIRATORY RATE: 16
RESPIRATORY RATE: 16
RESPIRATORY RATE: ABNORMAL
SAMPLE SITE: ABNORMAL
SEG NEUTROPHILS: 79 %
SEGMENTED NEUTROPHILS ABSOLUTE COUNT: 14.37 K/UL (ref 1.8–7.7)
SET RATE: ABNORMAL
SODIUM BLD-SCNC: 133 MMOL/L (ref 135–144)
SODIUM BLD-SCNC: 133 MMOL/L (ref 135–144)
SODIUM BLD-SCNC: 134 MMOL/L (ref 135–144)
SODIUM BLD-SCNC: 135 MMOL/L (ref 135–144)
SODIUM BLD-SCNC: 136 MMOL/L (ref 135–144)
SODIUM BLD-SCNC: 136 MMOL/L (ref 135–144)
TEXT FOR RESPIRATORY: ABNORMAL
TOTAL HB: ABNORMAL G/DL (ref 12–16)
TOTAL PROTEIN: 5.4 G/DL (ref 6.4–8.3)
TOTAL PROTEIN: 5.5 G/DL (ref 6.4–8.3)
TOTAL PROTEIN: 5.7 G/DL (ref 6.4–8.3)
TOTAL RATE: ABNORMAL
VT: ABNORMAL
WBC # BLD: 11.3 K/UL (ref 3.5–11)
WBC # BLD: 18.2 K/UL (ref 3.5–11)
WBC # BLD: 9.7 K/UL (ref 3.5–11)
WBC # BLD: ABNORMAL 10*3/UL

## 2017-10-23 PROCEDURE — 6370000000 HC RX 637 (ALT 250 FOR IP): Performed by: INTERNAL MEDICINE

## 2017-10-23 PROCEDURE — 82805 BLOOD GASES W/O2 SATURATION: CPT

## 2017-10-23 PROCEDURE — 83735 ASSAY OF MAGNESIUM: CPT

## 2017-10-23 PROCEDURE — 6370000000 HC RX 637 (ALT 250 FOR IP): Performed by: PHYSICIAN ASSISTANT

## 2017-10-23 PROCEDURE — 71010 XR CHEST PORTABLE: CPT

## 2017-10-23 PROCEDURE — 6360000002 HC RX W HCPCS: Performed by: INTERNAL MEDICINE

## 2017-10-23 PROCEDURE — 36415 COLL VENOUS BLD VENIPUNCTURE: CPT

## 2017-10-23 PROCEDURE — 85025 COMPLETE CBC W/AUTO DIFF WBC: CPT

## 2017-10-23 PROCEDURE — 80053 COMPREHEN METABOLIC PANEL: CPT

## 2017-10-23 PROCEDURE — 85027 COMPLETE CBC AUTOMATED: CPT

## 2017-10-23 PROCEDURE — 2580000003 HC RX 258: Performed by: INTERNAL MEDICINE

## 2017-10-23 PROCEDURE — 99253 IP/OBS CNSLTJ NEW/EST LOW 45: CPT | Performed by: FAMILY MEDICINE

## 2017-10-23 PROCEDURE — 82947 ASSAY GLUCOSE BLOOD QUANT: CPT

## 2017-10-23 PROCEDURE — 80048 BASIC METABOLIC PNL TOTAL CA: CPT

## 2017-10-23 PROCEDURE — 2000000000 HC ICU R&B

## 2017-10-23 PROCEDURE — 84100 ASSAY OF PHOSPHORUS: CPT

## 2017-10-23 RX ORDER — SODIUM CHLORIDE 9 MG/ML
INJECTION, SOLUTION INTRAVENOUS CONTINUOUS
Status: DISCONTINUED | OUTPATIENT
Start: 2017-10-23 | End: 2017-10-24 | Stop reason: HOSPADM

## 2017-10-23 RX ORDER — DEXTROSE MONOHYDRATE 25 G/50ML
12.5 INJECTION, SOLUTION INTRAVENOUS PRN
Status: DISCONTINUED | OUTPATIENT
Start: 2017-10-23 | End: 2017-10-24 | Stop reason: HOSPADM

## 2017-10-23 RX ORDER — NICOTINE POLACRILEX 4 MG
15 LOZENGE BUCCAL PRN
Status: DISCONTINUED | OUTPATIENT
Start: 2017-10-23 | End: 2017-10-24 | Stop reason: HOSPADM

## 2017-10-23 RX ORDER — DEXTROSE MONOHYDRATE 50 MG/ML
100 INJECTION, SOLUTION INTRAVENOUS PRN
Status: DISCONTINUED | OUTPATIENT
Start: 2017-10-23 | End: 2017-10-24 | Stop reason: HOSPADM

## 2017-10-23 RX ORDER — POTASSIUM CHLORIDE 20 MEQ/1
20 TABLET, EXTENDED RELEASE ORAL ONCE
Status: COMPLETED | OUTPATIENT
Start: 2017-10-23 | End: 2017-10-23

## 2017-10-23 RX ADMIN — ACETAMINOPHEN 500 MG: 500 TABLET ORAL at 17:54

## 2017-10-23 RX ADMIN — INSULIN LISPRO 8 UNITS: 100 INJECTION, SOLUTION INTRAVENOUS; SUBCUTANEOUS at 17:12

## 2017-10-23 RX ADMIN — ZOLPIDEM TARTRATE 5 MG: 5 TABLET, COATED ORAL at 22:06

## 2017-10-23 RX ADMIN — ACETAMINOPHEN 500 MG: 500 TABLET ORAL at 06:59

## 2017-10-23 RX ADMIN — SODIUM CHLORIDE: 9 INJECTION, SOLUTION INTRAVENOUS at 18:02

## 2017-10-23 RX ADMIN — INSULIN LISPRO 4 UNITS: 100 INJECTION, SOLUTION INTRAVENOUS; SUBCUTANEOUS at 11:59

## 2017-10-23 RX ADMIN — POTASSIUM CHLORIDE 20 MEQ: 20 TABLET, EXTENDED RELEASE ORAL at 09:24

## 2017-10-23 RX ADMIN — INSULIN LISPRO 4 UNITS: 100 INJECTION, SOLUTION INTRAVENOUS; SUBCUTANEOUS at 09:51

## 2017-10-23 RX ADMIN — INSULIN LISPRO 7 UNITS: 100 INJECTION, SOLUTION INTRAVENOUS; SUBCUTANEOUS at 21:25

## 2017-10-23 RX ADMIN — ATORVASTATIN CALCIUM 10 MG: 10 TABLET, FILM COATED ORAL at 09:25

## 2017-10-23 RX ADMIN — POTASSIUM CHLORIDE, DEXTROSE MONOHYDRATE AND SODIUM CHLORIDE: 150; 5; 450 INJECTION, SOLUTION INTRAVENOUS at 04:07

## 2017-10-23 RX ADMIN — INSULIN LISPRO 8 UNITS: 100 INJECTION, SOLUTION INTRAVENOUS; SUBCUTANEOUS at 16:01

## 2017-10-23 RX ADMIN — ENOXAPARIN SODIUM 40 MG: 40 INJECTION SUBCUTANEOUS at 09:25

## 2017-10-23 RX ADMIN — POTASSIUM CHLORIDE 10 MEQ: 10 INJECTION, SOLUTION INTRAVENOUS at 04:45

## 2017-10-23 RX ADMIN — INSULIN GLARGINE 15 UNITS: 100 INJECTION, SOLUTION SUBCUTANEOUS at 21:24

## 2017-10-23 RX ADMIN — DEXTROSE MONOHYDRATE 12.5 G: 25 INJECTION, SOLUTION INTRAVENOUS at 07:10

## 2017-10-23 RX ADMIN — ENALAPRIL MALEATE 2.5 MG: 2.5 TABLET ORAL at 09:24

## 2017-10-23 RX ADMIN — POTASSIUM CHLORIDE 10 MEQ: 10 INJECTION, SOLUTION INTRAVENOUS at 05:46

## 2017-10-23 ASSESSMENT — PAIN SCALES - GENERAL
PAINLEVEL_OUTOF10: 0
PAINLEVEL_OUTOF10: 1
PAINLEVEL_OUTOF10: 5
PAINLEVEL_OUTOF10: 0
PAINLEVEL_OUTOF10: 3
PAINLEVEL_OUTOF10: 0

## 2017-10-23 ASSESSMENT — PAIN DESCRIPTION - FREQUENCY
FREQUENCY: INTERMITTENT
FREQUENCY: INTERMITTENT

## 2017-10-23 ASSESSMENT — PAIN DESCRIPTION - PAIN TYPE
TYPE: ACUTE PAIN
TYPE: ACUTE PAIN

## 2017-10-23 ASSESSMENT — PAIN DESCRIPTION - DESCRIPTORS
DESCRIPTORS: HEADACHE
DESCRIPTORS: HEADACHE

## 2017-10-23 ASSESSMENT — PAIN DESCRIPTION - LOCATION
LOCATION: HEAD
LOCATION: HEAD

## 2017-10-23 NOTE — PLAN OF CARE
Problem: Falls - Risk of  Goal: Absence of falls  Outcome: Ongoing  Pt up with standby assistance, call light in reach, instructed pt to use call light for assistance, will continue to monitor    Problem: Serum Glucose Level - Abnormal:  Goal: Ability to maintain appropriate glucose levels will improve  Ability to maintain appropriate glucose levels will improve   Outcome: Ongoing  Will continue to monitor    Problem: Safety:  Goal: Free from accidental physical injury  Free from accidental physical injury   Outcome: Ongoing  Safety maintained, will continue to monitor    Problem: Daily Care:  Goal: Daily care needs are met  Daily care needs are met   Outcome: Ongoing  Pt able to complete daily cares with minimal assistance, will continue to monitor

## 2017-10-23 NOTE — PROGRESS NOTES
Discussed discharge plans with the patient. Patient is a 65year old female here with DKA. She is alert and oriented. Patient is  and lives with her boyfriend. She has a insulin pump. Patient does her own cooking and cleaning. She manages her own medication and drives. Her PCP is Dr. Karen Cardona. She has medical insurance that helps with medication costs. The discharge plan is home with no services. Patient is able financially to get her diabetic supplies.     MARIANNE Munoz

## 2017-10-23 NOTE — PLAN OF CARE
Problem: Nutrition  Goal: Optimal nutrition therapy  Outcome: Ongoing  Nutrition Problem: Inadequate oral intake  Intervention: Food and/or Nutrient Delivery: Modify current diet (CC 3 carbs per meal)  Nutritional Goals: PO > 75% of meals

## 2017-10-23 NOTE — PROGRESS NOTES
FSBS 68, pt alert and oriented, Insulin drip stopped per protocol orders, IV dextrose 12.5g given, will continue to monitor FSBS.

## 2017-10-23 NOTE — PROGRESS NOTES
an evening snack, recommended 15 grams with protein at HS. Recommended 45 grams carbohydrate minimum per meal. She has had her insulin pump since 2005 and states it does not have any alarms that would indicate there is a proablem. · Nutrition-Focused Physical Findings: Noted fruity breath at room visit. · Wound Type: None  · Current Nutrition Therapies:  · Oral Diet Orders: Carb Control 3 Carbs/Meal   · Oral Diet intake: %, Select  · Oral Nutrition Supplement (ONS) Orders: None  · ONS intake:    · Anthropometric Measures:  · Ht: 5' 5\" (165.1 cm)   · Current Body Wt: 140 lb (63.5 kg)  · Admission Body Wt: 140 lb (63.5 kg)  · Ideal Body Wt: 125 lb (56.7 kg), % Ideal Body  (112%)  · BMI Classification: BMI 18.5 - 24.9 Normal Weight  · Comparative Standards (Estimated Nutrition Needs):  · Estimated Daily Total Kcal: 2134-8577  Wt Readings from Last 10 Encounters:   10/22/17 140 lb (63.5 kg)   01/11/17 148 lb (67.1 kg)     Lab Results   Component Value Date    LABA1C 10.2 10/22/2017     Recent Labs      10/23/17   0301  10/23/17   0411  10/23/17   0502  10/23/17   0557  10/23/17   0701  10/23/17   0730  10/23/17   0752  10/23/17   0916   POCGLU  230*  213*  131*  91  68*  170*  181*  200*     Recent Labs      10/22/17   1600   10/22/17   2356  10/23/17   0400  10/23/17   0750   NA  136  137   < >  134*  133*  136   K  5.8*  5.8*   < >  4.8  4.1  4.4   CL  89*  91*   < >  101  101  103   CO2  7*  9*   < >  15*  19*  30   BUN  27*  26*   < >  22*  21*  19   CREATININE  0.92*  1.01*   < >  0.83  0.73  0.66   GLUCOSE  448*  463*   < >  220*  210*  187*   ALT  67*   --    --    --   43*   ALKPHOS  134*   --    --    --   81   GFR                           < >                                        < > = values in this interval not displayed.       Lab Results   Component Value Date    LABALBU 3.7 10/23/2017      Lab Results   Component Value Date    TRIG 66 02/20/2017     02/20/2017    LDLCALC 72

## 2017-10-23 NOTE — PROGRESS NOTES
Progress Note    SUBJECTIVE:  Patient seen for moderate DKA. Has no complaints this AM.  ROS:   Constitutional: negative  for fevers, and negative for chills. Respiratory: negative for shortness of breath, negative for cough, and negative for wheezing  Cardiovascular: negative for chest pain, and negative for palpitations  Gastrointestinal: negative for abdominal pain, negative for nausea,negative for vomiting, negative for diarrhea, and negative for constipation     All other systems were reviewed with the patient and are negative unless otherwise stated in HPI    OBJECTIVE:    EXAM:  Vitals:    10/23/17 0445   BP:    Pulse:    Resp:    Temp:    SpO2: 95%      Weight: 140 lb (63.5 kg)    Height: 5' 5\" (165.1 cm)     GEN:   A & O x3, no apparent distress  EYES: No gross abnormalities.   NECK: normal, supple, no lymphadenopathy,   PULM: clear to auscultation bilaterally- no wheezes, rales or rhonchi, normal air movement, no respiratory distress  COR: regular rate & rhythm, no murmurs and no gallops  ABD:  soft, non-tender, non-distended, normal bowel sounds, no masses or organomegaly  EXT:   no cyanosis, clubbing or edema present    NEURO: negative  SKIN:  no rashes or significant lesions    microbiology: blood culture: negative and urine culture: negative    CBC with Differential:    Lab Results   Component Value Date    WBC 18.2 10/23/2017    RBC 3.64 10/23/2017    HGB 11.6 10/23/2017    HCT 34.3 10/23/2017     10/23/2017    MCV 94.2 10/23/2017    MCH 31.8 10/23/2017    MCHC 33.8 10/23/2017    RDW 13.1 10/23/2017    LYMPHOPCT 13 10/23/2017    MONOPCT 8 10/23/2017    BASOPCT 0 10/23/2017    MONOSABS 1.46 10/23/2017    LYMPHSABS 2.37 10/23/2017    EOSABS 0.00 10/23/2017    BASOSABS 0.00 10/23/2017    DIFFTYPE NOT REPORTED 10/23/2017     BMP:    Lab Results   Component Value Date     10/23/2017    K 4.1 10/23/2017     10/23/2017    CO2 19 10/23/2017    BUN 21 10/23/2017    LABALBU 4.9 10/22/2017 CREATININE 0.73 10/23/2017    CALCIUM 8.3 10/23/2017    GFRAA >60 10/23/2017    LABGLOM >60 10/23/2017    GLUCOSE 210 10/23/2017     Magnesium:    Lab Results   Component Value Date    MG 2.1 10/23/2017     Phosphorus:    Lab Results   Component Value Date    PHOS 2.8 10/23/2017           No results found.     ASSESSMENT:  Resolving DKA            Hospital Problems:  Principal Problem:    Diabetic ketoacidosis without coma associated with type 1 diabetes mellitus ([facility]  Active Problems:    Dehydration      All Problems:  Patient Active Problem List   Diagnosis    Colon cancer screening    Family history of colon cancer    Colon polyp    Diabetic ketoacidosis without coma associated with type 1 diabetes mellitus ([facility]    Dehydration       PLAN:  · Continue IV fluids,insulin until 8AM draw    HIGH RISK MEDS: IV insulin    Everardo Abarca M.D.

## 2017-10-24 ENCOUNTER — HOSPITAL ENCOUNTER (OUTPATIENT)
Dept: NON INVASIVE DIAGNOSTICS | Age: 57
Discharge: HOME | DRG: 638 | End: 2017-10-24
Attending: INTERNAL MEDICINE
Payer: COMMERCIAL

## 2017-10-24 ENCOUNTER — HOSPITAL ENCOUNTER (OUTPATIENT)
Dept: NON INVASIVE DIAGNOSTICS | Age: 57
Discharge: HOME | End: 2017-10-24
Payer: COMMERCIAL

## 2017-10-24 VITALS
DIASTOLIC BLOOD PRESSURE: 97 MMHG | HEART RATE: 82 BPM | RESPIRATION RATE: 16 BRPM | HEIGHT: 65 IN | OXYGEN SATURATION: 99 % | SYSTOLIC BLOOD PRESSURE: 170 MMHG | WEIGHT: 145.6 LBS | TEMPERATURE: 97.5 F | BODY MASS INDEX: 24.26 KG/M2

## 2017-10-24 DIAGNOSIS — I49.3 FREQUENT PVCS: ICD-10-CM

## 2017-10-24 LAB
ALBUMIN SERPL-MCNC: 3.4 G/DL (ref 3.5–5.2)
ALBUMIN/GLOBULIN RATIO: 1.7 (ref 1–2.5)
ALLEN TEST: ABNORMAL
ALP BLD-CCNC: 82 U/L (ref 35–104)
ALT SERPL-CCNC: 36 U/L (ref 5–33)
ANION GAP SERPL CALCULATED.3IONS-SCNC: 13 MMOL/L (ref 9–17)
AST SERPL-CCNC: 30 U/L
BILIRUB SERPL-MCNC: 0.5 MG/DL (ref 0.3–1.2)
BUN BLDV-MCNC: 11 MG/DL (ref 6–20)
BUN/CREAT BLD: 19 (ref 9–20)
CALCIUM SERPL-MCNC: 8.5 MG/DL (ref 8.6–10.4)
CARBOXYHEMOGLOBIN: ABNORMAL % (ref 0–5)
CHLORIDE BLD-SCNC: 104 MMOL/L (ref 98–107)
CO2: 20 MMOL/L (ref 20–31)
CREAT SERPL-MCNC: 0.58 MG/DL (ref 0.5–0.9)
FIO2: ABNORMAL
GFR AFRICAN AMERICAN: >60 ML/MIN
GFR NON-AFRICAN AMERICAN: >60 ML/MIN
GFR SERPL CREATININE-BSD FRML MDRD: ABNORMAL ML/MIN/{1.73_M2}
GFR SERPL CREATININE-BSD FRML MDRD: ABNORMAL ML/MIN/{1.73_M2}
GLUCOSE BLD-MCNC: 255 MG/DL (ref 74–100)
GLUCOSE BLD-MCNC: 259 MG/DL (ref 70–99)
GLUCOSE BLD-MCNC: 260 MG/DL (ref 74–100)
HCO3 VENOUS: 21 MMOL/L (ref 24–30)
HCT VFR BLD CALC: 36 % (ref 36–46)
HEMOGLOBIN: 11.9 G/DL (ref 12–16)
LV EF: 70 %
LVEF MODALITY: NORMAL
MAGNESIUM: 2 MG/DL (ref 1.6–2.6)
MCH RBC QN AUTO: 31.6 PG (ref 26–34)
MCHC RBC AUTO-ENTMCNC: 33 G/DL (ref 31–37)
MCV RBC AUTO: 95.7 FL (ref 80–100)
METHEMOGLOBIN: ABNORMAL % (ref 0–1.9)
MODE: ABNORMAL
NEGATIVE BASE EXCESS, VEN: 3.9 MMOL/L (ref 0–2)
NOTIFICATION TIME: ABNORMAL
NOTIFICATION: ABNORMAL
O2 DEVICE/FLOW/%: ABNORMAL
O2 SAT, VEN: 41.9 % (ref 60–85)
OXYHEMOGLOBIN: ABNORMAL % (ref 95–98)
PATIENT TEMP: 37
PCO2, VEN, TEMP ADJ: ABNORMAL MMHG (ref 39–55)
PCO2, VEN: 37.9 (ref 39–55)
PDW BLD-RTO: 13.4 % (ref 12.1–15.2)
PEEP/CPAP: ABNORMAL
PH VENOUS: 7.36 (ref 7.32–7.42)
PH, VEN, TEMP ADJ: ABNORMAL (ref 7.32–7.42)
PHOSPHORUS: 2.8 MG/DL (ref 2.6–4.5)
PLATELET # BLD: 327 K/UL (ref 140–450)
PMV BLD AUTO: 7.5 FL (ref 6–12)
PO2, VEN, TEMP ADJ: ABNORMAL MMHG (ref 30–50)
PO2, VEN: 24.4 (ref 30–50)
POSITIVE BASE EXCESS, VEN: ABNORMAL MMOL/L (ref 0–2)
POTASSIUM SERPL-SCNC: 4.9 MMOL/L (ref 3.7–5.3)
PSV: ABNORMAL
PT. POSITION: ABNORMAL
RBC # BLD: 3.76 M/UL (ref 4–5.2)
RESPIRATORY RATE: ABNORMAL
SAMPLE SITE: ABNORMAL
SET RATE: ABNORMAL
SODIUM BLD-SCNC: 137 MMOL/L (ref 135–144)
TEXT FOR RESPIRATORY: ABNORMAL
THYROXINE, FREE: 1.16 NG/DL (ref 0.93–1.7)
TOTAL HB: ABNORMAL G/DL (ref 12–16)
TOTAL PROTEIN: 5.4 G/DL (ref 6.4–8.3)
TOTAL RATE: ABNORMAL
TSH SERPL DL<=0.05 MIU/L-ACNC: 1.8 MIU/L (ref 0.3–5)
VT: ABNORMAL
WBC # BLD: 8.2 K/UL (ref 3.5–11)

## 2017-10-24 PROCEDURE — 84443 ASSAY THYROID STIM HORMONE: CPT

## 2017-10-24 PROCEDURE — 84100 ASSAY OF PHOSPHORUS: CPT

## 2017-10-24 PROCEDURE — 80053 COMPREHEN METABOLIC PANEL: CPT

## 2017-10-24 PROCEDURE — 93225 XTRNL ECG REC<48 HRS REC: CPT

## 2017-10-24 PROCEDURE — 6360000002 HC RX W HCPCS: Performed by: INTERNAL MEDICINE

## 2017-10-24 PROCEDURE — 82947 ASSAY GLUCOSE BLOOD QUANT: CPT

## 2017-10-24 PROCEDURE — 6370000000 HC RX 637 (ALT 250 FOR IP): Performed by: INTERNAL MEDICINE

## 2017-10-24 PROCEDURE — 2580000003 HC RX 258: Performed by: INTERNAL MEDICINE

## 2017-10-24 PROCEDURE — 93306 TTE W/DOPPLER COMPLETE: CPT

## 2017-10-24 PROCEDURE — 36415 COLL VENOUS BLD VENIPUNCTURE: CPT

## 2017-10-24 PROCEDURE — 85027 COMPLETE CBC AUTOMATED: CPT

## 2017-10-24 PROCEDURE — 83735 ASSAY OF MAGNESIUM: CPT

## 2017-10-24 PROCEDURE — 84439 ASSAY OF FREE THYROXINE: CPT

## 2017-10-24 PROCEDURE — 82805 BLOOD GASES W/O2 SATURATION: CPT

## 2017-10-24 RX ORDER — DILTIAZEM HYDROCHLORIDE 120 MG/1
120 CAPSULE, COATED, EXTENDED RELEASE ORAL DAILY
Qty: 30 CAPSULE | Refills: 0 | Status: SHIPPED | OUTPATIENT
Start: 2017-10-24 | End: 2017-11-01 | Stop reason: SDUPTHER

## 2017-10-24 RX ADMIN — ATORVASTATIN CALCIUM 10 MG: 10 TABLET, FILM COATED ORAL at 08:06

## 2017-10-24 RX ADMIN — ENOXAPARIN SODIUM 40 MG: 40 INJECTION SUBCUTANEOUS at 08:06

## 2017-10-24 RX ADMIN — SODIUM CHLORIDE: 9 INJECTION, SOLUTION INTRAVENOUS at 10:15

## 2017-10-24 RX ADMIN — ENALAPRIL MALEATE 2.5 MG: 2.5 TABLET ORAL at 08:06

## 2017-10-24 RX ADMIN — INSULIN GLARGINE 15 UNITS: 100 INJECTION, SOLUTION SUBCUTANEOUS at 08:06

## 2017-10-24 RX ADMIN — INSULIN LISPRO 9 UNITS: 100 INJECTION, SOLUTION INTRAVENOUS; SUBCUTANEOUS at 06:37

## 2017-10-24 RX ADMIN — INSULIN LISPRO 9 UNITS: 100 INJECTION, SOLUTION INTRAVENOUS; SUBCUTANEOUS at 11:30

## 2017-10-24 RX ADMIN — ACETAMINOPHEN 500 MG: 500 TABLET ORAL at 09:10

## 2017-10-24 ASSESSMENT — PAIN DESCRIPTION - DESCRIPTORS: DESCRIPTORS: HEADACHE

## 2017-10-24 ASSESSMENT — PAIN SCALES - GENERAL
PAINLEVEL_OUTOF10: 4
PAINLEVEL_OUTOF10: 0

## 2017-10-24 ASSESSMENT — PAIN DESCRIPTION - PAIN TYPE: TYPE: ACUTE PAIN

## 2017-10-24 ASSESSMENT — PAIN DESCRIPTION - LOCATION: LOCATION: HEAD

## 2017-10-24 ASSESSMENT — PAIN DESCRIPTION - FREQUENCY: FREQUENCY: INTERMITTENT

## 2017-10-24 NOTE — PROGRESS NOTES
malnutrition    Likely discharge today    · High risk medication: none    CHRISTELLE SANCHEZ PA-C  10/24/2017, 9:14 AM    I spent 25 minutes providing critical care management to this patient.   This excludes time spent in performing separately billed procedures

## 2017-10-24 NOTE — DISCHARGE SUMMARY
Discharge Summary    Elba Kohli  :  1960  MRN:  [id number]    Admit date:  10/22/2017      Discharge date: 10/24/2017     Admitting Physician:  Franny Bar MD    Consultants:  Dr. Yohannes Mcknight, cardiology    Procedures: none    Complications: none    Discharge Condition: stable    Hospital Course:   Elba Kohli is a 65 y.o. female admitted with uncontrolled DM1 and DKA. She has a history of type 1 DM and user of an insulin pump. Morning 10/22/17 she developed nausea and vomiting. She was unable to keep anything down. Developed hyperglycemia, SOB, HA . Denied any other symtpoms. Seen and was admitted to ICU with DKA. She was started on Insulin gtt and fluids. Her electrolytes were replaced. Her anion gap closed and insulin gtt was stopped. He glucose is now under better control. Will hold off on her insulin pump for now. Will be sent home with Levemir 15units SQ bid and SS novolog. Juan was to discharge 10/23/17, but there was BMP diagnostic machine malfunction, so labs were not accurate. She did have asymptomatic frequent PVCs on telemetry. She has a history of sudden cardiac death ofher brother when he was in his 46s. She was seen by Dr. Yohannes Mcknight. She had normal ECHO. Cardizem CD 120mg added. 24h holter monitor on discharge Discharge to home.      Exam:  GEN:  alert and oriented to person, place and time, well-developed and well-nourished, in no acute distress  EYES: PERRL  NECK: normal, supple,  no carotid bruits  PULM: clear to auscultation bilaterally- no wheezes, rales or rhonchi, normal air movement, no respiratory distress  COR: regular rate & rhythm and no murmurs  ABD:  soft, non-tender, non-distended, normal bowel sounds, no masses or organomegaly  EXT:   no cyanosis, clubbing or edema present    NEURO: follows commands, DAY, no deficits  SKIN:  no rashes or significant lesions    Significant Diagnostic Studies:   Lab Results   Component Value Date    WBC 8.2 10/24/2017    HGB 11.9 (L) 10/24/2017    PLT 327 10/24/2017       Lab Results   Component Value Date    BUN 11 10/24/2017    CREATININE 0.58 10/24/2017     10/24/2017    K 4.9 10/24/2017    CALCIUM 8.5 (L) 10/24/2017     10/24/2017    CO2 20 10/24/2017    LABGLOM >60 10/24/2017       Lab Results   Component Value Date    WBCUA 0 TO 2 10/22/2017    RBCUA 0 TO 2 10/22/2017    EPITHUA 0 TO 2 10/22/2017    LEUKOCYTESUR NEGATIVE 10/22/2017    SPECGRAV >1.030 (H) 10/22/2017    GLUCOSEU 2+ (A) 10/22/2017    KETUA 3+ (A) 10/22/2017    PROTEINU TRACE (A) 10/22/2017    HGBUR 1+ (A) 10/22/2017    CASTUA HYALINE 10/22/2017    CRYSTUA NOT REPORTED 10/22/2017    BACTERIA TRACE (A) 10/22/2017    YEAST NOT REPORTED 10/22/2017       Xr Chest Portable    Result Date: 10/23/2017  REPORT: Portable AP radiograph of the chest obtained at 1643 hours INDICATION: DKA, dehydration, nausea FINDINGS: The lungs are well expanded and clear bilaterally. No focal consolidation, pleural effusion or pneumothorax seen. Normal cardiac and mediastinal silhouettes. No free intraperitoneal air.   Final report electronically signed by Bridgette Rutherford on 10/23/2017 5:04 PM    Normal chest      Discharge Diagnoses:    Principal Problem:    Diabetic ketoacidosis without coma associated with type 1 diabetes mellitus ([address]  Active Problems:    Dehydration    Mild malnutrition ([address]    Frequent PVCs      Active Hospital Problems    Diagnosis Date Noted    Diabetic ketoacidosis without coma associated with type 1 diabetes mellitus ([address] [E10.10] 10/22/2017     Priority: High     Class: Acute    Dehydration [E86.0] 10/22/2017     Priority: High     Class: Acute    Mild malnutrition ([address] [E44.1] 10/23/2017     Priority: Medium    Frequent PVCs [I49.3] 10/24/2017       Discharge Medications:       Carlos Garcia   Home Medication Instructions [id number]    Printed on:10/24/17 9140   Medication Information                      amphetamine-dextroamphetamine (ADDERALL XR) 30 MG extended release Discharge Prescriptions    Signed:  Kaitlyn Vasquez PA-C  10/24/2017, 11:44 AM

## 2017-10-24 NOTE — PLAN OF CARE
Problem: Serum Glucose Level - Abnormal:  Goal: Ability to maintain appropriate glucose levels will improve  Ability to maintain appropriate glucose levels will improve   Outcome: Ongoing  Will continue to monitor    Problem: Safety:  Goal: Free from accidental physical injury  Free from accidental physical injury   Outcome: Ongoing  Safety maintained, will continue to monitor

## 2017-10-25 NOTE — CONSULTS
Consults     Patient: Shae Washington  : 1960  Date of Admission: 10/22/2017  Primary Care Physician: Shiva Jones  Today's Date: 10/24/2017      REASON FOR CONSULTATION/CC: Frequent PVC's    HPI:  Ms. Flynn Pace is a 65 y.o. female who was admitted to the hospital with diabetic ketoacidosis. Ms. Flynn Pace denies any past medical history significant for coronary artery disease and denies any known cardiac problems. She reports a history of extra beats for about 10 years. She says this probably happens daily, but does seem to occur more when she is stress, or when she is laying down on her right side. She denied any current chest pain, shortness of breath, abdominal pain, bleeding problems, problems with her medications or any other concerns at this time. Past Medical History:   Diagnosis Date    Diabetes mellitus ([address]     PONV (postoperative nausea and vomiting)        CURRENT ALLERGIES: Bee venom and Sulfa antibiotics REVIEW OF SYSTEMS: 14 systems were reviewed. Pertinent positives and negatives as above, all else negative.      Past Surgical History:   Procedure Laterality Date    COLONOSCOPY  2017    -polyp(adenomatous polyp)    EYE SURGERY Bilateral 2007    ROTATOR CUFF REPAIR Left     TUBAL LIGATION      Social History:  Social History   Substance Use Topics    Smoking status: Former Smoker    Smokeless tobacco: Not on file    Alcohol use Yes      Comment: social        CURRENT MEDICATIONS:  Outpatient Prescriptions Marked as Taking for the 10/22/17 encounter [facility] Encounter)   Medication Sig Dispense Refill    insulin detemir (LEVEMIR FLEXTOUCH) 100 UNIT/ML injection pen Inject 15 Units into the skin 2 times daily 5 Pen 0    insulin aspart (NOVOLOG FLEXPEN) 100 UNIT/ML injection pen Check glucose at mealtime  Glucose: Dose:    No Insulin  140-199 3 Units  200-249 6 Units  250-299 9 Units  300-349 12 Units  350-400 15 Units  Over 400 18 Units 5 Pen 0    insulin aspart (NOVOLOG FLEXPEN) 100 UNIT/ML injection pen Check glucose before bed  Glucose: Dose:    No Insulin  140-199 2 Units  200-249 3 Units  250-299 5 Units  300-349 6 Units  350-400 7 Units  Over 400 9 Units 5 Pen 0    diltiazem (CARDIZEM CD) 120 MG extended release capsule Take 1 capsule by mouth daily 30 capsule 0    amphetamine-dextroamphetamine (ADDERALL XR) 30 MG extended release capsule 1 tab at 6 am, 1 tab at noon. Earliest Fill Date: 10/3/17 60 capsule 0    enalapril (VASOTEC) 2.5 MG tablet Take 1 tablet by mouth daily 30 tablet 5    atorvastatin (LIPITOR) 10 MG tablet Take 1 tablet by mouth daily 30 tablet 5    cyclobenzaprine (FLEXERIL) 10 MG tablet Take 1 tablet by mouth 3 times daily as needed for Muscle spasms 21 tablet 5       FAMILY HISTORY: Brother  of MI at 48years old. PHYSICAL EXAM:   BP (!) 170/97   Pulse 82   Temp 97.5 °F (36.4 °C) (Temporal)   Resp 16   Ht 5' 5\" (1.651 m)   Wt 145 lb 9.6 oz (66 kg)   SpO2 99%   BMI 24.23 kg/m²  Body mass index is 24.23 kg/m². Constitutional: She is oriented to person, place, and time. She appears well-developed and well-nourished. In no acute distress. HEENT: Normocephalic and atraumatic. No JVD present. Carotid bruit is not present. No mass and no thyromegaly present. No lymphadenopathy present. Cardiovascular: Normal rate, regular rhythm, normal heart sounds and intact distal pulses. Exam reveals no gallop and no friction rub. A I/VI systolic murmur was heard maximally at the 2nd right intercostal space. Pulmonary/Chest: Effort normal and breath sounds normal. No respiratory distress. She has no wheezes, rhonchi or rales. Abdominal: Soft, non-tender. Bowel sounds and aorta are normal. She exhibits no organomegaly, mass or bruit. Extremities: No edema, cyanosis, or clubbing. Pulses are 2+ radial/carotid/dorsalis pedis and posterior tibial bilaterally. Neurological: She is alert and oriented to person, place, and time.  No medical conditions, I believe the risk of significant morbidity and mortality is: Intermediate-High

## 2017-10-27 LAB
CULTURE: NORMAL
Lab: NORMAL
Lab: NORMAL
SPECIMEN DESCRIPTION: NORMAL
SPECIMEN DESCRIPTION: NORMAL
STATUS: NORMAL
STATUS: NORMAL

## 2017-11-01 ENCOUNTER — OFFICE VISIT (OUTPATIENT)
Dept: CARDIOLOGY | Age: 57
End: 2017-11-01
Payer: COMMERCIAL

## 2017-11-01 VITALS
HEART RATE: 72 BPM | SYSTOLIC BLOOD PRESSURE: 146 MMHG | BODY MASS INDEX: 25.78 KG/M2 | RESPIRATION RATE: 18 BRPM | WEIGHT: 151 LBS | OXYGEN SATURATION: 98 % | HEIGHT: 64 IN | DIASTOLIC BLOOD PRESSURE: 87 MMHG

## 2017-11-01 DIAGNOSIS — I49.3 FREQUENT PVCS: Primary | ICD-10-CM

## 2017-11-01 DIAGNOSIS — R94.31 HOLTER MONITOR, ABNORMAL: ICD-10-CM

## 2017-11-01 PROCEDURE — 99214 OFFICE O/P EST MOD 30 MIN: CPT | Performed by: FAMILY MEDICINE

## 2017-11-01 RX ORDER — DILTIAZEM HYDROCHLORIDE 180 MG/1
180 CAPSULE, COATED, EXTENDED RELEASE ORAL DAILY
Qty: 90 CAPSULE | Refills: 3 | Status: SHIPPED | OUTPATIENT
Start: 2017-11-01 | End: 2018-02-20 | Stop reason: SDUPTHER

## 2017-11-01 NOTE — PROGRESS NOTES
Patient: Heather Templeton  : 1960  Date of Visit: 2017    REASON FOR VISIT / CONSULTATION: Follow-up (1 week follow up. HX: Frequent PVC's. EKG done on 10/22, Echo done on 10/24, Holter done on 10/30. Dr. Montey Runner consulted on 10/23 and started him on Cardizem 120mg daily. Pt states he is doing ok. Denies: CP, palpitations, lightheaded/dizziness, SOB.)      Dear Dutch Davies      Ms. Sebas Baxter is a 65 y.o. female who was admitted on 10/22/2017 to the hospital with diabetic ketoacidosis. I had the pleasure of seeing her in my office for follow up today. Ms. Sebas Baxter denies any past medical history significant for coronary artery disease and denies any known history of cardiac problems. She reports a history of extra beats for about 10 years. She says this probably happens daily, but does seem to occur more when she is working hard, or when she is laying down on her right side. Most recently she had a Holter monitor done which had shown Frequent PVC's (12% total test duration)    Palpitations Symptoms: The symptoms are only moderate in severity but occuring daily. Aggravating factors: stress/anxiety or when laying down. Relieving factors: rest. Associated signs and symptoms: has no other cardiac complaints    She denied any current or recent chest pain, abdominal pain, bleeding problems, problems with her medications or any other concerns at this time. Past Medical History:   Diagnosis Date    Diabetes mellitus ([address]     PONV (postoperative nausea and vomiting)        CURRENT ALLERGIES: Bee venom and Sulfa antibiotics REVIEW OF SYSTEMS: 10 systems were reviewed. Pertinent positives and negatives as above, all else negative.      Past Surgical History:   Procedure Laterality Date    COLONOSCOPY  2017    -polyp(adenomatous polyp)    EYE SURGERY Bilateral 2007    ROTATOR CUFF REPAIR Left     TUBAL LIGATION      Social History:  Social History   Substance Use Topics    Smoking status: Former Smoker    Smokeless tobacco: Never Used    Alcohol use Yes      Comment: social        CURRENT MEDICATIONS:  Outpatient Prescriptions Marked as Taking for the 11/1/17 encounter (Office Visit) with Valerie Croft MD   Medication Sig Dispense Refill    amphetamine-dextroamphetamine (ADDERALL XR) 30 MG extended release capsule 1 tab at 6 am, 1 tab at noon. 60 capsule 0    insulin detemir (LEVEMIR FLEXTOUCH) 100 UNIT/ML injection pen Inject 15 Units into the skin 2 times daily 5 Pen 0    insulin aspart (NOVOLOG FLEXPEN) 100 UNIT/ML injection pen Check glucose at mealtime  Glucose: Dose:    No Insulin  140-199 3 Units  200-249 6 Units  250-299 9 Units  300-349 12 Units  350-400 15 Units  Over 400 18 Units 5 Pen 0    insulin aspart (NOVOLOG FLEXPEN) 100 UNIT/ML injection pen Check glucose before bed  Glucose: Dose:    No Insulin  140-199 2 Units  200-249 3 Units  250-299 5 Units  300-349 6 Units  350-400 7 Units  Over 400 9 Units 5 Pen 0    diltiazem (CARDIZEM CD) 120 MG extended release capsule Take 1 capsule by mouth daily 30 capsule 0    enalapril (VASOTEC) 2.5 MG tablet Take 1 tablet by mouth daily 30 tablet 5    atorvastatin (LIPITOR) 10 MG tablet Take 1 tablet by mouth daily 30 tablet 5    cyclobenzaprine (FLEXERIL) 10 MG tablet Take 1 tablet by mouth 3 times daily as needed for Muscle spasms 21 tablet 5       FAMILY HISTORY: family history includes Heart Attack (age of onset: 48) in her brother. PHYSICAL EXAM:   BP (!) 146/87 (Site: Left Arm, Position: Sitting, Cuff Size: Medium Adult)   Pulse 72   Resp 18   Ht 5' 4\" (1.626 m)   Wt 151 lb (68.5 kg)   SpO2 98%   BMI 25.92 kg/m²  Body mass index is 25.92 kg/m². Constitutional: She is oriented to person, place, and time. She appears well-developed and well-nourished. In no acute distress. HEENT: Normocephalic and atraumatic. No JVD present. Carotid bruit is not present. No mass and no thyromegaly present.  No lymphadenopathy present. Cardiovascular: Normal rate, regular rhythm, normal heart sounds and intact distal pulses. Exam reveals no gallop and no friction rub. A I/VI systolic murmur was heard at the base of the heart without significant radiation. Pulmonary/Chest: Effort normal and breath sounds normal. No respiratory distress. She has no wheezes, rhonchi or rales. Abdominal: Soft, non-tender. Bowel sounds and aorta are normal. She exhibits no organomegaly, mass or bruit. Extremities: No edema. No cyanosis and no clubbing. Pulses are 2+ radial and carotid pulses. 2+ dorsalis pedis and posterior tibial pulses bilaterally. Neurological: She is alert and oriented to person, place, and time. No evidence of gross cranial nerve deficit. Coordination appeared normal.   Skin: Skin is warm and dry. There is no rash or diaphoresis. Psychiatric: She has a normal mood and affect. Her speech is normal and behavior is normal.      MOST RECENT LABS ON RECORD:   Lab Results   Component Value Date    WBC 8.2 10/24/2017    HGB 11.9 (L) 10/24/2017    HCT 36.0 10/24/2017     10/24/2017    CHOL 193 02/20/2017    TRIG 66 02/20/2017     (A) 02/20/2017    ALT 36 (H) 10/24/2017    AST 30 10/24/2017     10/24/2017    K 4.9 10/24/2017     10/24/2017    CREATININE 0.58 10/24/2017    BUN 11 10/24/2017    CO2 20 10/24/2017    TSH 1.80 10/24/2017    LABA1C 10.2 (H) 10/22/2017       ASSESSMENT:  1. Holter monitor, abnormal    2. Frequent PVCs         PLAN:  · Very frequent PVC's: (>12% of total test duration)  · Additional Testing: I ordered a Lexiscan stress test with SPECT imaging to assess for myocardial ischemia. · Order repeat holter monitor to be done in 3 months   · CCB: Increase Cardizem CD from 120 mg to 180 mg daily to help to decrease PVC's. · Hypertension, Uncontrolled: I am a bit concerned about her blood pressure which remained elevated on 2 separate measurements.  Therefore I went ahead and increased her Cardizem CD to 180 mg daily in an attempt to better control her blood pressure. I also advised her to watch for any increased lightheadedness or dizziness which might result from this change and call our office if this developed. In the meantime, I encouraged Ms. Shaji Mcmillan to continue to take her other medications. FOLLOW UP:   I told Ms. Shaji Mcmillan to call my office if she had any problems, but otherwise I asked her to Return in about 3 months (around 2/1/2018). However, I would be happy to see her sooner should the need arise. Sincerely,  Tia Quiroz. Meg BUENO, MS, F.A.C.C. [facility] Cardiology Specialist     [facility], [address] [place] [address]  Phone: [phone], Fax: [phone]     I believe that the risk of significant morbidity and mortality related to the patient's current medical conditions are: Intermediate 25 minutes were spent with the patient,and/or arranging care with nursing and other members of the care team. All of the patients questions were answered. .      The documentation recorded by the scribe, accurately and completely reflects the services I personally performed and the decisions made by me. Elena Weaver 11/1/2017

## 2017-11-08 LAB — GLUCOSE BLD-MCNC: 429 MG/DL (ref 74–100)

## 2017-11-09 ENCOUNTER — HOSPITAL ENCOUNTER (OUTPATIENT)
Dept: NON INVASIVE DIAGNOSTICS | Age: 57
Discharge: HOME | End: 2017-11-09
Payer: COMMERCIAL

## 2017-11-09 PROCEDURE — 93017 CV STRESS TEST TRACING ONLY: CPT

## 2017-11-09 PROCEDURE — 78452 HT MUSCLE IMAGE SPECT MULT: CPT

## 2017-11-09 PROCEDURE — 3430000000 HC RX DIAGNOSTIC RADIOPHARMACEUTICAL: Performed by: FAMILY MEDICINE

## 2017-11-09 PROCEDURE — A9500 TC99M SESTAMIBI: HCPCS | Performed by: FAMILY MEDICINE

## 2017-11-09 RX ADMIN — Medication 30 MILLICURIE: at 08:45

## 2017-11-10 ENCOUNTER — HOSPITAL ENCOUNTER (OUTPATIENT)
Dept: NON INVASIVE DIAGNOSTICS | Age: 57
Discharge: HOME | End: 2017-11-10
Payer: COMMERCIAL

## 2017-11-10 PROCEDURE — A9500 TC99M SESTAMIBI: HCPCS | Performed by: FAMILY MEDICINE

## 2017-11-10 PROCEDURE — 3430000000 HC RX DIAGNOSTIC RADIOPHARMACEUTICAL: Performed by: FAMILY MEDICINE

## 2017-11-10 RX ADMIN — Medication 31.5 MILLICURIE: at 12:36

## 2017-12-08 ENCOUNTER — HOSPITAL ENCOUNTER (OUTPATIENT)
Dept: WOMENS IMAGING | Age: 57
Discharge: HOME | End: 2017-12-08
Payer: COMMERCIAL

## 2017-12-08 DIAGNOSIS — Z12.31 SCREENING MAMMOGRAM, ENCOUNTER FOR: ICD-10-CM

## 2017-12-08 PROCEDURE — G0202 SCR MAMMO BI INCL CAD: HCPCS

## 2018-02-13 ENCOUNTER — HOSPITAL ENCOUNTER (OUTPATIENT)
Dept: NON INVASIVE DIAGNOSTICS | Age: 58
Discharge: HOME | End: 2018-02-13
Payer: COMMERCIAL

## 2018-02-13 DIAGNOSIS — R94.31 HOLTER MONITOR, ABNORMAL: ICD-10-CM

## 2018-02-13 DIAGNOSIS — I49.3 FREQUENT PVCS: ICD-10-CM

## 2018-02-13 PROCEDURE — 93225 XTRNL ECG REC<48 HRS REC: CPT

## 2018-02-20 ENCOUNTER — OFFICE VISIT (OUTPATIENT)
Dept: CARDIOLOGY | Age: 58
End: 2018-02-20
Payer: COMMERCIAL

## 2018-02-20 VITALS
HEART RATE: 80 BPM | OXYGEN SATURATION: 96 % | HEIGHT: 65 IN | RESPIRATION RATE: 16 BRPM | BODY MASS INDEX: 26.16 KG/M2 | SYSTOLIC BLOOD PRESSURE: 154 MMHG | WEIGHT: 157 LBS | DIASTOLIC BLOOD PRESSURE: 87 MMHG

## 2018-02-20 DIAGNOSIS — I10 ESSENTIAL HYPERTENSION: ICD-10-CM

## 2018-02-20 DIAGNOSIS — R94.31 HOLTER MONITOR, ABNORMAL: ICD-10-CM

## 2018-02-20 DIAGNOSIS — I35.0 MILD AORTIC STENOSIS: ICD-10-CM

## 2018-02-20 DIAGNOSIS — I49.3 FREQUENT PVCS: Primary | ICD-10-CM

## 2018-02-20 PROCEDURE — 99214 OFFICE O/P EST MOD 30 MIN: CPT | Performed by: FAMILY MEDICINE

## 2018-02-20 RX ORDER — DILTIAZEM HYDROCHLORIDE 240 MG/1
240 CAPSULE, COATED, EXTENDED RELEASE ORAL DAILY
Qty: 90 CAPSULE | Refills: 3 | Status: SHIPPED | OUTPATIENT
Start: 2018-02-20 | End: 2018-09-10

## 2018-02-20 NOTE — PROGRESS NOTES
48) in her brother. PHYSICAL EXAM:   BP (!) 148/79 (Site: Left Arm, Position: Sitting, Cuff Size: Medium Adult)   Pulse 82   Resp 16   Ht 5' 4.5\" (1.638 m)   Wt 157 lb (71.2 kg)   SpO2 96%   BMI 26.53 kg/m²  Body mass index is 26.53 kg/m². Constitutional: She is oriented to person, place, and time. She appears well-developed and well-nourished. In no acute distress. HEENT: Normocephalic and atraumatic. No JVD present. Carotid bruit is not present. No mass and no thyromegaly present. No lymphadenopathy present. Cardiovascular: Normal rate, regular rhythm, normal heart sounds and intact distal pulses. Exam reveals no gallop and no friction rub. A II/VI systolic murmur was heard at the apex of the heart without significant radiation. Pulmonary/Chest: Effort normal and breath sounds normal. No respiratory distress. She has no wheezes, rhonchi or rales. Abdominal: Soft, non-tender. Bowel sounds and aorta are normal. She exhibits no organomegaly, mass or bruit. Extremities: No edema. No cyanosis and no clubbing. Pulses are 2+ radial and carotid pulses. 2+ dorsalis pedis and posterior tibial pulses bilaterally. Neurological: She is alert and oriented to person, place, and time. No evidence of gross cranial nerve deficit. Coordination appeared normal.   Skin: Skin is warm and dry. There is no rash or diaphoresis. Psychiatric: She has a normal mood and affect. Her speech is normal and behavior is normal.      MOST RECENT LABS ON RECORD:   Lab Results   Component Value Date    WBC 8.2 10/24/2017    HGB 11.9 (L) 10/24/2017    HCT 36.0 10/24/2017     10/24/2017    CHOL 193 02/20/2017    TRIG 66 02/20/2017     (A) 02/20/2017    ALT 36 (H) 10/24/2017    AST 30 10/24/2017     10/24/2017    K 4.9 10/24/2017     10/24/2017    CREATININE 0.58 10/24/2017    BUN 11 10/24/2017    CO2 20 10/24/2017    TSH 1.80 10/24/2017    LABA1C 7.9 12/12/2017       ASSESSMENT:  1. Frequent PVCs    2.

## 2018-04-12 PROBLEM — E86.0 DEHYDRATION: Status: RESOLVED | Noted: 2017-10-22 | Resolved: 2018-04-12

## 2018-05-08 PROBLEM — E10.9 DM (DIABETES MELLITUS), TYPE 1 (HCC): Status: ACTIVE | Noted: 2018-05-08

## 2018-07-02 ENCOUNTER — HOSPITAL ENCOUNTER (OUTPATIENT)
Age: 58
Discharge: HOME | End: 2018-07-02
Payer: COMMERCIAL

## 2018-07-02 DIAGNOSIS — E87.5 HYPERKALEMIA: ICD-10-CM

## 2018-07-02 LAB
ANION GAP SERPL CALCULATED.3IONS-SCNC: 9 MMOL/L (ref 9–17)
BUN BLDV-MCNC: 16 MG/DL (ref 6–20)
BUN/CREAT BLD: 21 (ref 9–20)
CALCIUM SERPL-MCNC: 9.7 MG/DL (ref 8.6–10.4)
CHLORIDE BLD-SCNC: 104 MMOL/L (ref 98–107)
CO2: 29 MMOL/L (ref 20–31)
CREAT SERPL-MCNC: 0.78 MG/DL (ref 0.5–0.9)
GFR AFRICAN AMERICAN: >60 ML/MIN
GFR NON-AFRICAN AMERICAN: >60 ML/MIN
GFR SERPL CREATININE-BSD FRML MDRD: ABNORMAL ML/MIN/{1.73_M2}
GFR SERPL CREATININE-BSD FRML MDRD: ABNORMAL ML/MIN/{1.73_M2}
GLUCOSE BLD-MCNC: 140 MG/DL (ref 70–99)
POTASSIUM SERPL-SCNC: 4.7 MMOL/L (ref 3.7–5.3)
SODIUM BLD-SCNC: 142 MMOL/L (ref 135–144)

## 2018-07-02 PROCEDURE — 80048 BASIC METABOLIC PNL TOTAL CA: CPT

## 2018-07-02 PROCEDURE — 36415 COLL VENOUS BLD VENIPUNCTURE: CPT

## 2018-09-10 ENCOUNTER — OFFICE VISIT (OUTPATIENT)
Dept: CARDIOLOGY | Age: 58
End: 2018-09-10
Payer: COMMERCIAL

## 2018-09-10 VITALS
OXYGEN SATURATION: 98 % | WEIGHT: 158 LBS | HEART RATE: 81 BPM | SYSTOLIC BLOOD PRESSURE: 158 MMHG | DIASTOLIC BLOOD PRESSURE: 93 MMHG | HEIGHT: 65 IN | BODY MASS INDEX: 26.33 KG/M2

## 2018-09-10 DIAGNOSIS — I35.0 MILD AORTIC STENOSIS: ICD-10-CM

## 2018-09-10 DIAGNOSIS — I49.3 FREQUENT PVCS: Primary | ICD-10-CM

## 2018-09-10 DIAGNOSIS — I10 ESSENTIAL HYPERTENSION: ICD-10-CM

## 2018-09-10 PROCEDURE — 99214 OFFICE O/P EST MOD 30 MIN: CPT | Performed by: FAMILY MEDICINE

## 2018-09-10 RX ORDER — DILTIAZEM HYDROCHLORIDE 360 MG/1
360 CAPSULE, EXTENDED RELEASE ORAL DAILY
Qty: 90 CAPSULE | Refills: 3 | Status: SHIPPED | OUTPATIENT
Start: 2018-09-10 | End: 2019-11-01 | Stop reason: SDUPTHER

## 2018-09-10 NOTE — PROGRESS NOTES
myocardial ischemia or infarction. EF 66%. The pt Flores treadmill score is 4 which correlates with a low/intermiediate risk for CAD.  History of Holter monitoring 02/16/2018    Very frequent PVC's. No symptoms were reported. Very frequent premature ventricular ectopic beats total 5610 consisting of 5610 isolated PVC's.  History of Holter monitoring 02/15/2018    Very frequent PVC's. No symptoms were reported. Consider additional testing and/or treatment if clinically indicated.  PONV (postoperative nausea and vomiting)        CURRENT ALLERGIES: Bee venom; Sulfa antibiotics; and Macrobid [nitrofurantoin macrocrystal] REVIEW OF SYSTEMS: 10 systems were reviewed. Pertinent positives and negatives as above, all else negative. Past Surgical History:   Procedure Laterality Date    COLONOSCOPY  01/11/2017    -polyp(adenomatous polyp)    EYE SURGERY Bilateral 2007    ROTATOR CUFF REPAIR Left 2009    TUBAL LIGATION  1993    Social History:  Social History   Substance Use Topics    Smoking status: Former Smoker    Smokeless tobacco: Never Used    Alcohol use Yes      Comment: social        CURRENT MEDICATIONS:  Outpatient Prescriptions Marked as Taking for the 9/10/18 encounter (Office Visit) with Edwar Mckeon MD   Medication Sig Dispense Refill    ADDERALL XR 30 MG capsule 1 tab at 6 am, 1 tab at noon  MAURY. Earliest Fill Date: 9/5/18 60 capsule 0    insulin aspart (NOVOLOG) 100 UNIT/ML injection vial 100 units qd per pump 3 vial 5    cyclobenzaprine (FLEXERIL) 10 MG tablet Take 1 tablet by mouth 3 times daily as needed for Muscle spasms 30 tablet 2    diltiazem (CARDIZEM CD) 240 MG extended release capsule Take 1 capsule by mouth daily 90 capsule 3    enalapril (VASOTEC) 2.5 MG tablet Take 1 tablet by mouth daily 90 tablet 3    atorvastatin (LIPITOR) 10 MG tablet Take 1 tablet by mouth daily 90 tablet 3    fluocinonide (LIDEX) 0.05 % cream Apply topically 2 times daily.  (Patient taking differently: as needed Apply topically 2 times daily.) 30 g 3       FAMILY HISTORY: family history includes Heart Attack (age of onset: 48) in her brother. PHYSICAL EXAM:   BP (!) 158/93 (Site: Left Upper Arm, Position: Sitting, Cuff Size: Medium Adult)   Pulse 81   Ht 5' 5\" (1.651 m)   Wt 158 lb (71.7 kg)   SpO2 98%   BMI 26.29 kg/m²  Body mass index is 26.29 kg/m². Constitutional: She is oriented to person, place, and time. She appears well-developed and well-nourished. In no acute distress. HEENT: Normocephalic and atraumatic. No JVD present. Carotid bruit is not present. No mass and no thyromegaly present. No lymphadenopathy present. Cardiovascular: Normal rate, regular rhythm, normal heart sounds and intact distal pulses. Exam reveals no gallop and no friction rub. A II/VI systolic murmur was heard at the 2nd intercostal space of the heart without significant radiation. Pulmonary/Chest: Effort normal and breath sounds normal. No respiratory distress. She has no wheezes, rhonchi or rales. Abdominal: Soft, non-tender. Bowel sounds and aorta are normal. She exhibits no organomegaly, mass or bruit. Extremities: No edema. No cyanosis and no clubbing. Pulses are 2+ radial and carotid pulses. 2+ dorsalis pedis and posterior tibial pulses bilaterally. Neurological: She is alert and oriented to person, place, and time. No evidence of gross cranial nerve deficit. Coordination appeared normal.   Skin: Skin is warm and dry. There is no rash or diaphoresis. Psychiatric: She has a normal mood and affect.  Her speech is normal and behavior is normal.      MOST RECENT LABS ON RECORD:   Lab Results   Component Value Date    WBC 8.2 10/24/2017    HGB 11.9 (L) 10/24/2017    HCT 36.0 10/24/2017     10/24/2017    CHOL 193 02/20/2017    TRIG 66 02/20/2017     (A) 02/20/2017    ALT 36 (H) 10/24/2017    AST 30 10/24/2017     07/02/2018    K 4.7 07/02/2018     07/02/2018    CREATININE 0.78

## 2018-09-19 ENCOUNTER — APPOINTMENT (OUTPATIENT)
Dept: CT IMAGING | Age: 58
DRG: 854 | End: 2018-09-19
Attending: INTERNAL MEDICINE
Payer: COMMERCIAL

## 2018-09-19 ENCOUNTER — APPOINTMENT (OUTPATIENT)
Dept: GENERAL RADIOLOGY | Age: 58
DRG: 854 | End: 2018-09-19
Attending: INTERNAL MEDICINE
Payer: COMMERCIAL

## 2018-09-19 ENCOUNTER — HOSPITAL ENCOUNTER (INPATIENT)
Age: 58
LOS: 6 days | Discharge: HOME HEALTH | DRG: 854 | End: 2018-09-25
Attending: INTERNAL MEDICINE | Admitting: INTERNAL MEDICINE
Payer: COMMERCIAL

## 2018-09-19 DIAGNOSIS — N39.0 UTI DUE TO EXTENDED-SPECTRUM BETA LACTAMASE (ESBL) PRODUCING ESCHERICHIA COLI: ICD-10-CM

## 2018-09-19 DIAGNOSIS — B96.29 UTI DUE TO EXTENDED-SPECTRUM BETA LACTAMASE (ESBL) PRODUCING ESCHERICHIA COLI: ICD-10-CM

## 2018-09-19 DIAGNOSIS — Z16.12 UTI DUE TO EXTENDED-SPECTRUM BETA LACTAMASE (ESBL) PRODUCING ESCHERICHIA COLI: ICD-10-CM

## 2018-09-19 DIAGNOSIS — A41.51 SEPSIS DUE TO ESCHERICHIA COLI (HCC): ICD-10-CM

## 2018-09-19 DIAGNOSIS — A41.9 SEPSIS, DUE TO UNSPECIFIED ORGANISM: Primary | ICD-10-CM

## 2018-09-19 DIAGNOSIS — Z90.49 S/P CHOLECYSTECTOMY: ICD-10-CM

## 2018-09-19 DIAGNOSIS — F41.9 ANXIETY: ICD-10-CM

## 2018-09-19 LAB
-: ABNORMAL
ABSOLUTE EOS #: 0 K/UL (ref 0–0.44)
ABSOLUTE EOS #: 0 K/UL (ref 0–0.44)
ABSOLUTE IMMATURE GRANULOCYTE: 0 K/UL (ref 0–0.3)
ABSOLUTE IMMATURE GRANULOCYTE: 0 K/UL (ref 0–0.3)
ABSOLUTE LYMPH #: 0.23 K/UL (ref 1.1–3.7)
ABSOLUTE LYMPH #: 0.6 K/UL (ref 1.1–3.7)
ABSOLUTE MONO #: 0.11 K/UL (ref 0.1–1.2)
ABSOLUTE MONO #: 0.4 K/UL (ref 0.1–1.2)
ALBUMIN SERPL-MCNC: 3.4 G/DL (ref 3.5–5.2)
ALBUMIN SERPL-MCNC: 3.8 G/DL (ref 3.5–5.2)
ALBUMIN/GLOBULIN RATIO: 1.3 (ref 1–2.5)
ALBUMIN/GLOBULIN RATIO: 1.4 (ref 1–2.5)
ALLEN TEST: ABNORMAL
ALP BLD-CCNC: 108 U/L (ref 35–104)
ALP BLD-CCNC: 87 U/L (ref 35–104)
ALT SERPL-CCNC: 21 U/L (ref 5–33)
ALT SERPL-CCNC: 25 U/L (ref 5–33)
AMORPHOUS: ABNORMAL
ANION GAP SERPL CALCULATED.3IONS-SCNC: 14 MMOL/L (ref 9–17)
ANION GAP SERPL CALCULATED.3IONS-SCNC: 14 MMOL/L (ref 9–17)
AST SERPL-CCNC: 25 U/L
AST SERPL-CCNC: 30 U/L
BACTERIA: ABNORMAL
BASOPHILS # BLD: 0 % (ref 0–2)
BASOPHILS # BLD: 1 % (ref 0–2)
BASOPHILS ABSOLUTE: 0 K/UL (ref 0–0.2)
BASOPHILS ABSOLUTE: 0.11 K/UL (ref 0–0.2)
BILIRUB SERPL-MCNC: 0.81 MG/DL (ref 0.3–1.2)
BILIRUB SERPL-MCNC: 0.89 MG/DL (ref 0.3–1.2)
BILIRUBIN URINE: NEGATIVE
BUN BLDV-MCNC: 15 MG/DL (ref 6–20)
BUN BLDV-MCNC: 19 MG/DL (ref 6–20)
BUN/CREAT BLD: 23 (ref 9–20)
BUN/CREAT BLD: 27 (ref 9–20)
CALCIUM SERPL-MCNC: 8.2 MG/DL (ref 8.6–10.4)
CALCIUM SERPL-MCNC: 9.4 MG/DL (ref 8.6–10.4)
CARBOXYHEMOGLOBIN: ABNORMAL % (ref 0–5)
CASTS UA: ABNORMAL /LPF
CHLORIDE BLD-SCNC: 97 MMOL/L (ref 98–107)
CHLORIDE BLD-SCNC: 99 MMOL/L (ref 98–107)
CO2: 19 MMOL/L (ref 20–31)
CO2: 23 MMOL/L (ref 20–31)
COLOR: YELLOW
COMMENT UA: ABNORMAL
CREAT SERPL-MCNC: 0.64 MG/DL (ref 0.5–0.9)
CREAT SERPL-MCNC: 0.7 MG/DL (ref 0.5–0.9)
CRYSTALS, UA: ABNORMAL /HPF
DIFFERENTIAL TYPE: ABNORMAL
DIFFERENTIAL TYPE: ABNORMAL
EKG ATRIAL RATE: 81 BPM
EKG P-R INTERVAL: 134 MS
EKG Q-T INTERVAL: 374 MS
EKG QRS DURATION: 80 MS
EKG QTC CALCULATION (BAZETT): 434 MS
EKG R AXIS: 31 DEGREES
EKG T AXIS: 24 DEGREES
EKG VENTRICULAR RATE: 81 BPM
EOSINOPHILS RELATIVE PERCENT: 0 % (ref 1–4)
EOSINOPHILS RELATIVE PERCENT: 0 % (ref 1–4)
EPITHELIAL CELLS UA: ABNORMAL /HPF (ref 0–25)
FIO2: ABNORMAL
GFR AFRICAN AMERICAN: >60 ML/MIN
GFR AFRICAN AMERICAN: >60 ML/MIN
GFR NON-AFRICAN AMERICAN: >60 ML/MIN
GFR NON-AFRICAN AMERICAN: >60 ML/MIN
GFR SERPL CREATININE-BSD FRML MDRD: ABNORMAL ML/MIN/{1.73_M2}
GLUCOSE BLD-MCNC: 164 MG/DL (ref 74–100)
GLUCOSE BLD-MCNC: 175 MG/DL (ref 70–99)
GLUCOSE BLD-MCNC: 219 MG/DL (ref 74–100)
GLUCOSE BLD-MCNC: 222 MG/DL (ref 70–99)
GLUCOSE BLD-MCNC: 240 MG/DL (ref 74–100)
GLUCOSE BLD-MCNC: 251 MG/DL (ref 74–100)
GLUCOSE BLD-MCNC: 287 MG/DL (ref 74–100)
GLUCOSE BLD-MCNC: 296 MG/DL (ref 74–100)
GLUCOSE URINE: NEGATIVE
HCO3 VENOUS: 15.9 MMOL/L (ref 24–30)
HCT VFR BLD CALC: 36.3 % (ref 36.3–47.1)
HCT VFR BLD CALC: 37.1 % (ref 36.3–47.1)
HEMOGLOBIN: 11.7 G/DL (ref 11.9–15.1)
HEMOGLOBIN: 12.4 G/DL (ref 11.9–15.1)
IMMATURE GRANULOCYTES: 0 %
IMMATURE GRANULOCYTES: 0 %
KETONES, URINE: ABNORMAL
LACTIC ACID, SEPSIS WHOLE BLOOD: NORMAL MMOL/L (ref 0.5–1.9)
LACTIC ACID, SEPSIS WHOLE BLOOD: NORMAL MMOL/L (ref 0.5–1.9)
LACTIC ACID, SEPSIS: 0.8 MMOL/L (ref 0.5–1.9)
LACTIC ACID, SEPSIS: 1.5 MMOL/L (ref 0.5–1.9)
LACTIC ACID, WHOLE BLOOD: NORMAL MMOL/L (ref 0.7–2.1)
LACTIC ACID: 0.7 MMOL/L (ref 0.5–2.2)
LEUKOCYTE ESTERASE, URINE: ABNORMAL
LYMPHOCYTES # BLD: 2 % (ref 24–43)
LYMPHOCYTES # BLD: 6 % (ref 24–43)
MCH RBC QN AUTO: 31.7 PG (ref 25.2–33.5)
MCH RBC QN AUTO: 31.8 PG (ref 25.2–33.5)
MCHC RBC AUTO-ENTMCNC: 32.2 G/DL (ref 28.4–34.8)
MCHC RBC AUTO-ENTMCNC: 33.4 G/DL (ref 28.4–34.8)
MCV RBC AUTO: 95.1 FL (ref 82.6–102.9)
MCV RBC AUTO: 98.4 FL (ref 82.6–102.9)
METHEMOGLOBIN: ABNORMAL % (ref 0–1.9)
MODE: ABNORMAL
MONOCYTES # BLD: 1 % (ref 3–12)
MONOCYTES # BLD: 4 % (ref 3–12)
MORPHOLOGY: NORMAL
MORPHOLOGY: NORMAL
MUCUS: ABNORMAL
NEGATIVE BASE EXCESS, VEN: 8.4 MMOL/L (ref 0–2)
NITRITE, URINE: POSITIVE
NOTIFICATION TIME: ABNORMAL
NOTIFICATION: ABNORMAL
NRBC AUTOMATED: 0 PER 100 WBC
NRBC AUTOMATED: 0 PER 100 WBC
O2 DEVICE/FLOW/%: ABNORMAL
O2 SAT, VEN: 94.7 % (ref 60–85)
OTHER OBSERVATIONS UA: ABNORMAL
OXYHEMOGLOBIN: ABNORMAL % (ref 95–98)
PATIENT TEMP: 37
PCO2, VEN, TEMP ADJ: ABNORMAL MMHG (ref 39–55)
PCO2, VEN: 30 (ref 39–55)
PDW BLD-RTO: 12.7 % (ref 11.8–14.4)
PDW BLD-RTO: 12.7 % (ref 11.8–14.4)
PEEP/CPAP: ABNORMAL
PH UA: 6 (ref 5–9)
PH VENOUS: 7.34 (ref 7.32–7.42)
PH, VEN, TEMP ADJ: ABNORMAL (ref 7.32–7.42)
PLATELET # BLD: 223 K/UL (ref 138–453)
PLATELET # BLD: ABNORMAL K/UL (ref 138–453)
PLATELET ESTIMATE: ABNORMAL
PLATELET ESTIMATE: ABNORMAL
PLATELET, FLUORESCENCE: 256 K/UL (ref 138–453)
PLATELET, IMMATURE FRACTION: 2 % (ref 1.1–10.3)
PMV BLD AUTO: 9.3 FL (ref 8.1–13.5)
PMV BLD AUTO: ABNORMAL FL (ref 8.1–13.5)
PO2, VEN, TEMP ADJ: ABNORMAL MMHG (ref 30–50)
PO2, VEN: 76.1 (ref 30–50)
POSITIVE BASE EXCESS, VEN: ABNORMAL MMOL/L (ref 0–2)
POTASSIUM SERPL-SCNC: 3.9 MMOL/L (ref 3.7–5.3)
POTASSIUM SERPL-SCNC: 4 MMOL/L (ref 3.7–5.3)
PROCALCITONIN: 0.06 NG/ML
PROTEIN UA: ABNORMAL
PSV: ABNORMAL
PT. POSITION: ABNORMAL
RBC # BLD: 3.69 M/UL (ref 3.95–5.11)
RBC # BLD: 3.9 M/UL (ref 3.95–5.11)
RBC # BLD: ABNORMAL 10*6/UL
RBC # BLD: ABNORMAL 10*6/UL
RBC UA: ABNORMAL /HPF (ref 0–2)
RENAL EPITHELIAL, UA: ABNORMAL /HPF
RESPIRATORY RATE: ABNORMAL
SAMPLE SITE: ABNORMAL
SEG NEUTROPHILS: 90 % (ref 36–65)
SEG NEUTROPHILS: 96 % (ref 36–65)
SEGMENTED NEUTROPHILS ABSOLUTE COUNT: 10.85 K/UL (ref 1.5–8.1)
SEGMENTED NEUTROPHILS ABSOLUTE COUNT: 9 K/UL (ref 1.5–8.1)
SET RATE: ABNORMAL
SODIUM BLD-SCNC: 132 MMOL/L (ref 135–144)
SODIUM BLD-SCNC: 134 MMOL/L (ref 135–144)
SPECIFIC GRAVITY UA: 1.02 (ref 1.01–1.02)
TEXT FOR RESPIRATORY: ABNORMAL
TOTAL HB: ABNORMAL G/DL (ref 12–16)
TOTAL PROTEIN: 5.9 G/DL (ref 6.4–8.3)
TOTAL PROTEIN: 6.7 G/DL (ref 6.4–8.3)
TOTAL RATE: ABNORMAL
TRICHOMONAS: ABNORMAL
TURBIDITY: ABNORMAL
URINE HGB: ABNORMAL
UROBILINOGEN, URINE: NORMAL
VT: ABNORMAL
WBC # BLD: 10 K/UL (ref 3.5–11.3)
WBC # BLD: 11.3 K/UL (ref 3.5–11.3)
WBC # BLD: ABNORMAL 10*3/UL
WBC # BLD: ABNORMAL 10*3/UL
WBC UA: ABNORMAL /HPF (ref 0–5)
YEAST: ABNORMAL

## 2018-09-19 PROCEDURE — 82947 ASSAY GLUCOSE BLOOD QUANT: CPT

## 2018-09-19 PROCEDURE — 93005 ELECTROCARDIOGRAM TRACING: CPT

## 2018-09-19 PROCEDURE — 87086 URINE CULTURE/COLONY COUNT: CPT

## 2018-09-19 PROCEDURE — 81001 URINALYSIS AUTO W/SCOPE: CPT

## 2018-09-19 PROCEDURE — 87088 URINE BACTERIA CULTURE: CPT

## 2018-09-19 PROCEDURE — 87205 SMEAR GRAM STAIN: CPT

## 2018-09-19 PROCEDURE — 80053 COMPREHEN METABOLIC PANEL: CPT

## 2018-09-19 PROCEDURE — 6370000000 HC RX 637 (ALT 250 FOR IP): Performed by: INTERNAL MEDICINE

## 2018-09-19 PROCEDURE — 36415 COLL VENOUS BLD VENIPUNCTURE: CPT

## 2018-09-19 PROCEDURE — 87040 BLOOD CULTURE FOR BACTERIA: CPT

## 2018-09-19 PROCEDURE — 85025 COMPLETE CBC W/AUTO DIFF WBC: CPT

## 2018-09-19 PROCEDURE — 71046 X-RAY EXAM CHEST 2 VIEWS: CPT

## 2018-09-19 PROCEDURE — 84145 PROCALCITONIN (PCT): CPT

## 2018-09-19 PROCEDURE — 82805 BLOOD GASES W/O2 SATURATION: CPT

## 2018-09-19 PROCEDURE — 74176 CT ABD & PELVIS W/O CONTRAST: CPT

## 2018-09-19 PROCEDURE — 87077 CULTURE AEROBIC IDENTIFY: CPT

## 2018-09-19 PROCEDURE — 6360000002 HC RX W HCPCS: Performed by: INTERNAL MEDICINE

## 2018-09-19 PROCEDURE — 2580000003 HC RX 258: Performed by: INTERNAL MEDICINE

## 2018-09-19 PROCEDURE — 2000000000 HC ICU R&B

## 2018-09-19 PROCEDURE — 83605 ASSAY OF LACTIC ACID: CPT

## 2018-09-19 PROCEDURE — 87186 SC STD MICRODIL/AGAR DIL: CPT

## 2018-09-19 RX ORDER — DEXTROSE MONOHYDRATE 50 MG/ML
100 INJECTION, SOLUTION INTRAVENOUS PRN
Status: DISCONTINUED | OUTPATIENT
Start: 2018-09-19 | End: 2018-09-25 | Stop reason: HOSPADM

## 2018-09-19 RX ORDER — ACETAMINOPHEN 500 MG
500 TABLET ORAL EVERY 6 HOURS PRN
Status: DISCONTINUED | OUTPATIENT
Start: 2018-09-19 | End: 2018-09-19

## 2018-09-19 RX ORDER — SODIUM CHLORIDE 9 MG/ML
INJECTION, SOLUTION INTRAVENOUS CONTINUOUS
Status: DISCONTINUED | OUTPATIENT
Start: 2018-09-19 | End: 2018-09-22

## 2018-09-19 RX ORDER — ONDANSETRON 2 MG/ML
4 INJECTION INTRAMUSCULAR; INTRAVENOUS EVERY 6 HOURS PRN
Status: DISCONTINUED | OUTPATIENT
Start: 2018-09-19 | End: 2018-09-25 | Stop reason: HOSPADM

## 2018-09-19 RX ORDER — DILTIAZEM HYDROCHLORIDE 180 MG/1
360 CAPSULE, COATED, EXTENDED RELEASE ORAL DAILY
Status: DISCONTINUED | OUTPATIENT
Start: 2018-09-19 | End: 2018-09-25 | Stop reason: HOSPADM

## 2018-09-19 RX ORDER — NICOTINE POLACRILEX 4 MG
15 LOZENGE BUCCAL PRN
Status: DISCONTINUED | OUTPATIENT
Start: 2018-09-19 | End: 2018-09-25 | Stop reason: HOSPADM

## 2018-09-19 RX ORDER — SODIUM CHLORIDE 0.9 % (FLUSH) 0.9 %
10 SYRINGE (ML) INJECTION EVERY 12 HOURS SCHEDULED
Status: DISCONTINUED | OUTPATIENT
Start: 2018-09-19 | End: 2018-09-25 | Stop reason: HOSPADM

## 2018-09-19 RX ORDER — ACETAMINOPHEN 500 MG
1000 TABLET ORAL EVERY 6 HOURS PRN
Status: DISCONTINUED | OUTPATIENT
Start: 2018-09-19 | End: 2018-09-25 | Stop reason: HOSPADM

## 2018-09-19 RX ORDER — DEXTROSE MONOHYDRATE 25 G/50ML
12.5 INJECTION, SOLUTION INTRAVENOUS PRN
Status: DISCONTINUED | OUTPATIENT
Start: 2018-09-19 | End: 2018-09-25 | Stop reason: HOSPADM

## 2018-09-19 RX ORDER — LORAZEPAM 0.5 MG/1
0.5 TABLET ORAL 3 TIMES DAILY PRN
Status: DISCONTINUED | OUTPATIENT
Start: 2018-09-19 | End: 2018-09-25 | Stop reason: HOSPADM

## 2018-09-19 RX ORDER — HYDROMORPHONE HCL 110MG/55ML
0.25 PATIENT CONTROLLED ANALGESIA SYRINGE INTRAVENOUS
Status: DISCONTINUED | OUTPATIENT
Start: 2018-09-19 | End: 2018-09-20

## 2018-09-19 RX ORDER — ATORVASTATIN CALCIUM 10 MG/1
10 TABLET, FILM COATED ORAL DAILY
Status: DISCONTINUED | OUTPATIENT
Start: 2018-09-19 | End: 2018-09-25 | Stop reason: HOSPADM

## 2018-09-19 RX ORDER — 0.9 % SODIUM CHLORIDE 0.9 %
30 INTRAVENOUS SOLUTION INTRAVENOUS ONCE
Status: COMPLETED | OUTPATIENT
Start: 2018-09-19 | End: 2018-09-19

## 2018-09-19 RX ORDER — SODIUM CHLORIDE 0.9 % (FLUSH) 0.9 %
10 SYRINGE (ML) INJECTION PRN
Status: DISCONTINUED | OUTPATIENT
Start: 2018-09-19 | End: 2018-09-25 | Stop reason: HOSPADM

## 2018-09-19 RX ORDER — HYDROMORPHONE HCL 110MG/55ML
0.5 PATIENT CONTROLLED ANALGESIA SYRINGE INTRAVENOUS
Status: DISCONTINUED | OUTPATIENT
Start: 2018-09-19 | End: 2018-09-20

## 2018-09-19 RX ORDER — ENALAPRIL MALEATE 2.5 MG/1
2.5 TABLET ORAL DAILY
Status: DISCONTINUED | OUTPATIENT
Start: 2018-09-19 | End: 2018-09-25 | Stop reason: HOSPADM

## 2018-09-19 RX ORDER — PANTOPRAZOLE SODIUM 40 MG/1
40 TABLET, DELAYED RELEASE ORAL DAILY
Status: DISCONTINUED | OUTPATIENT
Start: 2018-09-19 | End: 2018-09-25 | Stop reason: HOSPADM

## 2018-09-19 RX ORDER — CYCLOBENZAPRINE HCL 10 MG
10 TABLET ORAL 3 TIMES DAILY PRN
Status: DISCONTINUED | OUTPATIENT
Start: 2018-09-19 | End: 2018-09-25 | Stop reason: HOSPADM

## 2018-09-19 RX ADMIN — ENALAPRIL MALEATE 2.5 MG: 2.5 TABLET ORAL at 09:54

## 2018-09-19 RX ADMIN — SODIUM CHLORIDE 2166 ML: 9 INJECTION, SOLUTION INTRAVENOUS at 04:31

## 2018-09-19 RX ADMIN — SODIUM CHLORIDE: 9 INJECTION, SOLUTION INTRAVENOUS at 12:16

## 2018-09-19 RX ADMIN — ATORVASTATIN CALCIUM 10 MG: 10 TABLET, FILM COATED ORAL at 09:54

## 2018-09-19 RX ADMIN — SODIUM CHLORIDE: 9 INJECTION, SOLUTION INTRAVENOUS at 19:07

## 2018-09-19 RX ADMIN — LORAZEPAM 0.5 MG: 0.5 TABLET ORAL at 22:03

## 2018-09-19 RX ADMIN — LORAZEPAM 0.5 MG: 0.5 TABLET ORAL at 06:48

## 2018-09-19 RX ADMIN — ACETAMINOPHEN 500 MG: 500 TABLET ORAL at 02:36

## 2018-09-19 RX ADMIN — ENOXAPARIN SODIUM 40 MG: 40 INJECTION SUBCUTANEOUS at 09:55

## 2018-09-19 RX ADMIN — CEFEPIME 2 G: 2 INJECTION, POWDER, FOR SOLUTION INTRAMUSCULAR; INTRAVENOUS at 02:37

## 2018-09-19 RX ADMIN — ONDANSETRON 4 MG: 2 INJECTION INTRAMUSCULAR; INTRAVENOUS at 03:13

## 2018-09-19 RX ADMIN — INSULIN LISPRO 4 UNITS: 100 INJECTION, SOLUTION INTRAVENOUS; SUBCUTANEOUS at 13:57

## 2018-09-19 RX ADMIN — INSULIN LISPRO 6 UNITS: 100 INJECTION, SOLUTION INTRAVENOUS; SUBCUTANEOUS at 09:52

## 2018-09-19 RX ADMIN — DILTIAZEM HYDROCHLORIDE 360 MG: 180 CAPSULE, COATED, EXTENDED RELEASE ORAL at 09:54

## 2018-09-19 RX ADMIN — CEFEPIME HYDROCHLORIDE 2 G: 2 INJECTION, POWDER, FOR SOLUTION INTRAVENOUS at 14:06

## 2018-09-19 RX ADMIN — HYDROMORPHONE HYDROCHLORIDE 0.5 MG: 2 INJECTION, SOLUTION INTRAMUSCULAR; INTRAVENOUS; SUBCUTANEOUS at 12:22

## 2018-09-19 RX ADMIN — SODIUM CHLORIDE: 9 INJECTION, SOLUTION INTRAVENOUS at 05:45

## 2018-09-19 RX ADMIN — ONDANSETRON 4 MG: 2 INJECTION INTRAMUSCULAR; INTRAVENOUS at 17:13

## 2018-09-19 RX ADMIN — HYDROMORPHONE HYDROCHLORIDE: 2 INJECTION, SOLUTION INTRAMUSCULAR; INTRAVENOUS; SUBCUTANEOUS at 04:35

## 2018-09-19 RX ADMIN — INSULIN LISPRO 6 UNITS: 100 INJECTION, SOLUTION INTRAVENOUS; SUBCUTANEOUS at 18:15

## 2018-09-19 RX ADMIN — INSULIN LISPRO 4 UNITS: 100 INJECTION, SOLUTION INTRAVENOUS; SUBCUTANEOUS at 05:46

## 2018-09-19 RX ADMIN — PANTOPRAZOLE SODIUM 40 MG: 40 TABLET, DELAYED RELEASE ORAL at 09:54

## 2018-09-19 RX ADMIN — ONDANSETRON 4 MG: 2 INJECTION INTRAMUSCULAR; INTRAVENOUS at 09:27

## 2018-09-19 RX ADMIN — HYDROMORPHONE HYDROCHLORIDE 0.5 MG: 2 INJECTION, SOLUTION INTRAMUSCULAR; INTRAVENOUS; SUBCUTANEOUS at 19:14

## 2018-09-19 RX ADMIN — INSULIN LISPRO 2 UNITS: 100 INJECTION, SOLUTION INTRAVENOUS; SUBCUTANEOUS at 02:44

## 2018-09-19 RX ADMIN — SODIUM CHLORIDE: 9 INJECTION, SOLUTION INTRAVENOUS at 02:06

## 2018-09-19 RX ADMIN — SODIUM CHLORIDE 1000 ML: 9 INJECTION, SOLUTION INTRAVENOUS at 03:43

## 2018-09-19 RX ADMIN — INSULIN LISPRO 6 UNITS: 100 INJECTION, SOLUTION INTRAVENOUS; SUBCUTANEOUS at 22:02

## 2018-09-19 ASSESSMENT — PAIN DESCRIPTION - ORIENTATION
ORIENTATION: RIGHT
ORIENTATION: RIGHT;LOWER
ORIENTATION: RIGHT
ORIENTATION: RIGHT;LOWER
ORIENTATION: RIGHT

## 2018-09-19 ASSESSMENT — PAIN DESCRIPTION - ONSET: ONSET: ON-GOING

## 2018-09-19 ASSESSMENT — PAIN DESCRIPTION - DESCRIPTORS
DESCRIPTORS: SHOOTING;SHARP
DESCRIPTORS: STABBING
DESCRIPTORS: SHARP
DESCRIPTORS: SHARP;SHOOTING
DESCRIPTORS: SHARP;STABBING
DESCRIPTORS: ACHING;SHARP

## 2018-09-19 ASSESSMENT — PAIN DESCRIPTION - PAIN TYPE
TYPE: ACUTE PAIN

## 2018-09-19 ASSESSMENT — PAIN SCALES - GENERAL
PAINLEVEL_OUTOF10: 8
PAINLEVEL_OUTOF10: 5
PAINLEVEL_OUTOF10: 7
PAINLEVEL_OUTOF10: 6
PAINLEVEL_OUTOF10: 8
PAINLEVEL_OUTOF10: 5
PAINLEVEL_OUTOF10: 5
PAINLEVEL_OUTOF10: 4
PAINLEVEL_OUTOF10: 4

## 2018-09-19 ASSESSMENT — PAIN DESCRIPTION - LOCATION
LOCATION: ABDOMEN
LOCATION: BACK
LOCATION: ABDOMEN;BACK
LOCATION: BACK
LOCATION: ABDOMEN;BACK

## 2018-09-19 NOTE — H&P
9/10/18   Gabi Dyer MD   ADDERALL XR 30 MG capsule 1 tab at 6 am, 1 tab at noon  MAURY. Earliest Fill Date: 9/5/18 9/5/18 10/6/18  Israel Dodson MD   insulin aspart (NOVOLOG) 100 UNIT/ML injection vial 100 units qd per pump 8/3/18   Israel Dodson MD   gabapentin (NEURONTIN) 100 MG capsule Take 1 capsule by mouth 2 times daily for 30 days. . 4/11/18 5/11/18  Israel Dodson MD   cyclobenzaprine (FLEXERIL) 10 MG tablet Take 1 tablet by mouth 3 times daily as needed for Muscle spasms 4/9/18   Israel Dodson MD   enalapril (VASOTEC) 2.5 MG tablet Take 1 tablet by mouth daily 2/12/18   Israel Dodson MD   atorvastatin (LIPITOR) 10 MG tablet Take 1 tablet by mouth daily 2/12/18   Israel Dodson MD   fluocinonide (LIDEX) 0.05 % cream Apply topically 2 times daily. Patient taking differently: as needed Apply topically 2 times daily. 11/17/17   Israel Dodson MD       Allergies:  Bee venom; Sulfa antibiotics; and Macrobid [nitrofurantoin macrocrystal]    Social History:   TOBACCO:   reports that she has quit smoking. She has never used smokeless tobacco.  ETOH:   reports that she drinks alcohol.   OCCUPATION: retired    Family History:   Family History   Problem Relation Age of Onset    Heart Attack Brother 48       Review of Systems:  Constitutional: positive for anorexia, chills, fatigue, malaise and sweats, negative for fevers and weight loss  Eyes: negative  Ears, nose, mouth, throat, and face: negative  Respiratory: negative for asthma, chronic bronchitis, cough, dyspnea on exertion, emphysema, hemoptysis, pleurisy/chest pain, pneumonia, shortness of breath, sputum, stridor and wheezing  Cardiovascular: positive for palpitations and has ventricular ectopy, negative for chest pain, chest pressure/discomfort, claudication, dyspnea, exertional chest pressure/discomfort, fatigue, irregular heart beat, lower extremity edema, near-syncope, orthopnea, paroxysmal nocturnal dyspnea, syncope and tachypnea  Gastrointestinal: positive for nausea, negative for abdominal pain, change in bowel habits, constipation, diarrhea, dyspepsia, dysphagia, jaundice, melena, odynophagia, reflux symptoms and vomiting  Genitourinary:positive for dysuria, frequency, urinary incontinence and right flank pain, negative for decreased stream, hematuria, hesitancy and nocturia  Integument/breast: negative for changed mole, dryness, pruritus, rash, skin color change and skin lesion(s)  Hematologic/lymphatic: negative for bleeding, easy bruising, lymphadenopathy and petechiae  Musculoskeletal:positive for back pain, negative for arthralgias, back pain, bone pain, neck pain and stiff joints  Neurological: negative  Behavioral/Psych: negative  All other systems were reviewed with the patient and are negative except as stated  Physical Exam:    Vitals: There were no vitals filed for this visit. GEN:   A & O x3, moderate distress  EYES: No gross abnormalities. NECK: normal, supple, no lymphadenopathy,  no carotid bruits  PULM: clear to auscultation bilaterally- no wheezes, rales or rhonchi, normal air movement, no respiratory distress  COR: regular rate & rhythm, no murmurs and no gallops  ABD:  soft, non-tender, non-distended, normal bowel sounds, no masses or organomegaly and soft, minimally tender on right mid abd. EXT:   no cyanosis, clubbing or edema present    NEURO: negative  SKIN:  no rashes or significant lesions      -----------------------------------------------------------------  Diagnostic Data: Reviewed    Assessment:      Hospital Problems:  Active Problems:    [facility]is [facility]  Resolved Problems:    * No resolved hospital problems.  *      All Problems:  Patient Active Problem List   Diagnosis    Colon cancer screening    Family history of colon cancer    Colon polyp    Diabetic ketoacidosis without coma associated with type 1 diabetes mellitus ([facility]    Mild malnutrition ([facility]    Frequent PVCs    DM

## 2018-09-19 NOTE — PROGRESS NOTES
Discussed discharge plans with the patient. Patient is a 63year old female here with acute pyelo, possible sepsis . She is alert and oriented. Patient is  and lives with her fiance. She has a glucometer at home. Patient does the cooking and the cleaning. She manages her own medications and drives. Her PCP is Dr. Anjelica Guevara. She has medical insurance that helps with medication costs. The discharge plan is home with no services.     MARIANNE Haley

## 2018-09-19 NOTE — PLAN OF CARE
Problem: Pain:  Goal: Pain level will decrease  Pain level will decrease   Outcome: Ongoing  Patient continues with complaint of right flank pain.   Patient able to verbalize pain on scale 0-10 and understands available pain medication

## 2018-09-19 NOTE — PLAN OF CARE
Problem: Gas Exchange - Impaired:  Goal: Levels of oxygenation will improve  Levels of oxygenation will improve  Outcome: Ongoing  Patient continues on room air, sao2 at 96%. Will continue to monitor. Problem: Infection, Septic Shock:  Goal: Will show no infection signs and symptoms  Will show no infection signs and symptoms  Outcome: Ongoing  Patient temp 99.3 on admission. Will continue to monitor. Problem: Serum Glucose Level - Abnormal:  Goal: Ability to maintain appropriate glucose levels will improve  Ability to maintain appropriate glucose levels will improve  Outcome: Ongoing  Monitoring glucose every 4 hours with insuline coverage per orders.

## 2018-09-19 NOTE — PROGRESS NOTES
Results   Component Value Date     (L) 09/19/2018    K 3.9 09/19/2018    CL 99 09/19/2018    CO2 19 (L) 09/19/2018    BUN 15 09/19/2018    CREATININE 0.64 09/19/2018    GLUCOSE 222 (H) 09/19/2018    CALCIUM 8.2 (L) 09/19/2018    PROT 5.9 (L) 09/19/2018    LABALBU 3.4 (L) 09/19/2018    BILITOT 0.89 09/19/2018    ALKPHOS 108 (H) 09/19/2018    AST 30 09/19/2018    ALT 25 09/19/2018    LABGLOM >60 09/19/2018    GFRAA >60 09/19/2018     Lab Results   Component Value Date    LABA1C 9.1 05/08/2018     Lab Results   Component Value Date     10/22/2017     No results found for: VITD25    Estimated Intake vs Estimated Needs: Intake Less Than Needs    Nutrition Risk Level: Moderate    Poor, but improved glucose control in last year. Has insulin pump, and contends good knowledge of carbs and carb insulin ratio. NPO currently, with some nausea reported. Weight is trending upward. Will monitor diet progression and tolerance.        Nutrition Interventions:   Start oral diet  Continued Inpatient Monitoring, Coordination of Care, Education declined    Nutrition Evaluation:   · Evaluation: Goals set   · Goals: PO>75% meals with appropriate carb choices    · Monitoring: Meal Intake, Pertinent Labs, Weight, Patient/Family Education    Electronically signed by Cristino Ventura RD, LD on 9/19/18 at 8:13 AM    Contact Number: [phone]

## 2018-09-20 ENCOUNTER — ANESTHESIA EVENT (OUTPATIENT)
Dept: OPERATING ROOM | Age: 58
DRG: 854 | End: 2018-09-20
Payer: COMMERCIAL

## 2018-09-20 ENCOUNTER — ANESTHESIA (OUTPATIENT)
Dept: OPERATING ROOM | Age: 58
DRG: 854 | End: 2018-09-20
Payer: COMMERCIAL

## 2018-09-20 ENCOUNTER — APPOINTMENT (OUTPATIENT)
Dept: CT IMAGING | Age: 58
DRG: 854 | End: 2018-09-20
Attending: INTERNAL MEDICINE
Payer: COMMERCIAL

## 2018-09-20 ENCOUNTER — APPOINTMENT (OUTPATIENT)
Dept: NUCLEAR MEDICINE | Age: 58
DRG: 854 | End: 2018-09-20
Attending: INTERNAL MEDICINE
Payer: COMMERCIAL

## 2018-09-20 ENCOUNTER — APPOINTMENT (OUTPATIENT)
Dept: ULTRASOUND IMAGING | Age: 58
DRG: 854 | End: 2018-09-20
Attending: INTERNAL MEDICINE
Payer: COMMERCIAL

## 2018-09-20 VITALS
TEMPERATURE: 100.2 F | OXYGEN SATURATION: 98 % | DIASTOLIC BLOOD PRESSURE: 70 MMHG | SYSTOLIC BLOOD PRESSURE: 154 MMHG | RESPIRATION RATE: 19 BRPM

## 2018-09-20 LAB
ABSOLUTE EOS #: 0.18 K/UL (ref 0–0.44)
ABSOLUTE IMMATURE GRANULOCYTE: 0 K/UL (ref 0–0.3)
ABSOLUTE LYMPH #: 1.76 K/UL (ref 1.1–3.7)
ABSOLUTE MONO #: 1.23 K/UL (ref 0.1–1.2)
ALBUMIN SERPL-MCNC: 3.1 G/DL (ref 3.5–5.2)
ALBUMIN/GLOBULIN RATIO: 1.1 (ref 1–2.5)
ALP BLD-CCNC: 212 U/L (ref 35–104)
ALT SERPL-CCNC: 70 U/L (ref 5–33)
ANION GAP SERPL CALCULATED.3IONS-SCNC: 18 MMOL/L (ref 9–17)
AST SERPL-CCNC: 77 U/L
BASOPHILS # BLD: 1 % (ref 0–2)
BASOPHILS ABSOLUTE: 0.18 K/UL (ref 0–0.2)
BILIRUB SERPL-MCNC: 0.67 MG/DL (ref 0.3–1.2)
BUN BLDV-MCNC: 22 MG/DL (ref 6–20)
BUN/CREAT BLD: 25 (ref 9–20)
CALCIUM SERPL-MCNC: 8.5 MG/DL (ref 8.6–10.4)
CHLORIDE BLD-SCNC: 101 MMOL/L (ref 98–107)
CO2: 14 MMOL/L (ref 20–31)
CREAT SERPL-MCNC: 0.87 MG/DL (ref 0.5–0.9)
CULTURE: ABNORMAL
DIFFERENTIAL TYPE: ABNORMAL
EOSINOPHILS RELATIVE PERCENT: 1 % (ref 1–4)
GFR AFRICAN AMERICAN: >60 ML/MIN
GFR NON-AFRICAN AMERICAN: >60 ML/MIN
GFR SERPL CREATININE-BSD FRML MDRD: ABNORMAL ML/MIN/{1.73_M2}
GFR SERPL CREATININE-BSD FRML MDRD: ABNORMAL ML/MIN/{1.73_M2}
GLUCOSE BLD-MCNC: 322 MG/DL (ref 74–100)
GLUCOSE BLD-MCNC: 325 MG/DL (ref 74–100)
GLUCOSE BLD-MCNC: 330 MG/DL (ref 74–100)
GLUCOSE BLD-MCNC: 338 MG/DL (ref 74–100)
GLUCOSE BLD-MCNC: 340 MG/DL (ref 70–99)
GLUCOSE BLD-MCNC: 341 MG/DL (ref 74–100)
GLUCOSE BLD-MCNC: 388 MG/DL (ref 74–100)
GLUCOSE BLD-MCNC: 390 MG/DL (ref 74–100)
GLUCOSE BLD-MCNC: 394 MG/DL (ref 74–100)
HCT VFR BLD CALC: 36.1 % (ref 36.3–47.1)
HEMOGLOBIN: 11.4 G/DL (ref 11.9–15.1)
IMMATURE GRANULOCYTES: 0 %
LACTIC ACID, WHOLE BLOOD: NORMAL MMOL/L (ref 0.7–2.1)
LACTIC ACID: 1.4 MMOL/L (ref 0.5–2.2)
LYMPHOCYTES # BLD: 10 % (ref 24–43)
Lab: ABNORMAL
Lab: ABNORMAL
MCH RBC QN AUTO: 31.8 PG (ref 25.2–33.5)
MCHC RBC AUTO-ENTMCNC: 31.6 G/DL (ref 28.4–34.8)
MCV RBC AUTO: 100.8 FL (ref 82.6–102.9)
METAMYELOCYTES ABSOLUTE COUNT: 0.18 K/UL
METAMYELOCYTES: 1 %
MONOCYTES # BLD: 7 % (ref 3–12)
MORPHOLOGY: NORMAL
NRBC AUTOMATED: 0 PER 100 WBC
ORGANISM: ABNORMAL
ORGANISM: ABNORMAL
PDW BLD-RTO: 13 % (ref 11.8–14.4)
PLATELET # BLD: 226 K/UL (ref 138–453)
PLATELET ESTIMATE: ABNORMAL
PMV BLD AUTO: 9.8 FL (ref 8.1–13.5)
POTASSIUM SERPL-SCNC: 4.9 MMOL/L (ref 3.7–5.3)
RBC # BLD: 3.58 M/UL (ref 3.95–5.11)
RBC # BLD: ABNORMAL 10*6/UL
SEG NEUTROPHILS: 80 % (ref 36–65)
SEGMENTED NEUTROPHILS ABSOLUTE COUNT: 14.07 K/UL (ref 1.5–8.1)
SODIUM BLD-SCNC: 133 MMOL/L (ref 135–144)
SPECIMEN DESCRIPTION: ABNORMAL
SPECIMEN DESCRIPTION: ABNORMAL
STATUS: ABNORMAL
STATUS: ABNORMAL
TOTAL PROTEIN: 6 G/DL (ref 6.4–8.3)
WBC # BLD: 17.6 K/UL (ref 3.5–11.3)
WBC # BLD: ABNORMAL 10*3/UL

## 2018-09-20 PROCEDURE — 88304 TISSUE EXAM BY PATHOLOGIST: CPT

## 2018-09-20 PROCEDURE — 2580000003 HC RX 258: Performed by: NURSE ANESTHETIST, CERTIFIED REGISTERED

## 2018-09-20 PROCEDURE — 0FT44ZZ RESECTION OF GALLBLADDER, PERCUTANEOUS ENDOSCOPIC APPROACH: ICD-10-PCS | Performed by: SURGERY

## 2018-09-20 PROCEDURE — 76705 ECHO EXAM OF ABDOMEN: CPT

## 2018-09-20 PROCEDURE — S2900 ROBOTIC SURGICAL SYSTEM: HCPCS | Performed by: SURGERY

## 2018-09-20 PROCEDURE — 2500000003 HC RX 250 WO HCPCS: Performed by: INTERNAL MEDICINE

## 2018-09-20 PROCEDURE — 99253 IP/OBS CNSLTJ NEW/EST LOW 45: CPT | Performed by: SURGERY

## 2018-09-20 PROCEDURE — 85025 COMPLETE CBC W/AUTO DIFF WBC: CPT

## 2018-09-20 PROCEDURE — 3700000000 HC ANESTHESIA ATTENDED CARE: Performed by: SURGERY

## 2018-09-20 PROCEDURE — 3700000001 HC ADD 15 MINUTES (ANESTHESIA): Performed by: SURGERY

## 2018-09-20 PROCEDURE — 2580000003 HC RX 258: Performed by: INTERNAL MEDICINE

## 2018-09-20 PROCEDURE — 36415 COLL VENOUS BLD VENIPUNCTURE: CPT

## 2018-09-20 PROCEDURE — 2000000000 HC ICU R&B

## 2018-09-20 PROCEDURE — 78227 HEPATOBIL SYST IMAGE W/DRUG: CPT

## 2018-09-20 PROCEDURE — 6370000000 HC RX 637 (ALT 250 FOR IP): Performed by: INTERNAL MEDICINE

## 2018-09-20 PROCEDURE — 80053 COMPREHEN METABOLIC PANEL: CPT

## 2018-09-20 PROCEDURE — 2500000003 HC RX 250 WO HCPCS: Performed by: SURGERY

## 2018-09-20 PROCEDURE — 2580000003 HC RX 258: Performed by: SURGERY

## 2018-09-20 PROCEDURE — A9537 TC99M MEBROFENIN: HCPCS | Performed by: SURGERY

## 2018-09-20 PROCEDURE — 3430000000 HC RX DIAGNOSTIC RADIOPHARMACEUTICAL: Performed by: SURGERY

## 2018-09-20 PROCEDURE — 8E0W4CZ ROBOTIC ASSISTED PROCEDURE OF TRUNK REGION, PERCUTANEOUS ENDOSCOPIC APPROACH: ICD-10-PCS | Performed by: SURGERY

## 2018-09-20 PROCEDURE — 3600000009 HC SURGERY ROBOT BASE: Performed by: SURGERY

## 2018-09-20 PROCEDURE — 82947 ASSAY GLUCOSE BLOOD QUANT: CPT

## 2018-09-20 PROCEDURE — 0DNU4ZZ RELEASE OMENTUM, PERCUTANEOUS ENDOSCOPIC APPROACH: ICD-10-PCS | Performed by: SURGERY

## 2018-09-20 PROCEDURE — 6360000002 HC RX W HCPCS: Performed by: INTERNAL MEDICINE

## 2018-09-20 PROCEDURE — 2709999900 HC NON-CHARGEABLE SUPPLY: Performed by: SURGERY

## 2018-09-20 PROCEDURE — 6360000004 HC RX CONTRAST MEDICATION: Performed by: INTERNAL MEDICINE

## 2018-09-20 PROCEDURE — 7100000001 HC PACU RECOVERY - ADDTL 15 MIN: Performed by: SURGERY

## 2018-09-20 PROCEDURE — 7100000000 HC PACU RECOVERY - FIRST 15 MIN: Performed by: SURGERY

## 2018-09-20 PROCEDURE — 47562 LAPAROSCOPIC CHOLECYSTECTOMY: CPT | Performed by: SURGERY

## 2018-09-20 PROCEDURE — 83605 ASSAY OF LACTIC ACID: CPT

## 2018-09-20 PROCEDURE — 6370000000 HC RX 637 (ALT 250 FOR IP): Performed by: SURGERY

## 2018-09-20 PROCEDURE — S0028 INJECTION, FAMOTIDINE, 20 MG: HCPCS | Performed by: SURGERY

## 2018-09-20 PROCEDURE — 3600000019 HC SURGERY ROBOT ADDTL 15MIN: Performed by: SURGERY

## 2018-09-20 PROCEDURE — 74160 CT ABDOMEN W/CONTRAST: CPT

## 2018-09-20 PROCEDURE — 6360000002 HC RX W HCPCS: Performed by: SURGERY

## 2018-09-20 PROCEDURE — 6360000002 HC RX W HCPCS: Performed by: NURSE ANESTHETIST, CERTIFIED REGISTERED

## 2018-09-20 PROCEDURE — 2780000010 HC IMPLANT OTHER: Performed by: SURGERY

## 2018-09-20 PROCEDURE — 6370000000 HC RX 637 (ALT 250 FOR IP): Performed by: NURSE ANESTHETIST, CERTIFIED REGISTERED

## 2018-09-20 PROCEDURE — 2500000003 HC RX 250 WO HCPCS: Performed by: NURSE ANESTHETIST, CERTIFIED REGISTERED

## 2018-09-20 RX ORDER — OXYCODONE HYDROCHLORIDE AND ACETAMINOPHEN 5; 325 MG/1; MG/1
1 TABLET ORAL EVERY 4 HOURS PRN
Status: DISCONTINUED | OUTPATIENT
Start: 2018-09-20 | End: 2018-09-23

## 2018-09-20 RX ORDER — FENTANYL CITRATE 50 UG/ML
50 INJECTION, SOLUTION INTRAMUSCULAR; INTRAVENOUS EVERY 5 MIN PRN
Status: DISCONTINUED | OUTPATIENT
Start: 2018-09-20 | End: 2018-09-20 | Stop reason: HOSPADM

## 2018-09-20 RX ORDER — MIDAZOLAM HYDROCHLORIDE 1 MG/ML
INJECTION INTRAMUSCULAR; INTRAVENOUS PRN
Status: DISCONTINUED | OUTPATIENT
Start: 2018-09-20 | End: 2018-09-20 | Stop reason: SDUPTHER

## 2018-09-20 RX ORDER — FENTANYL CITRATE 50 UG/ML
25 INJECTION, SOLUTION INTRAMUSCULAR; INTRAVENOUS EVERY 5 MIN PRN
Status: DISCONTINUED | OUTPATIENT
Start: 2018-09-20 | End: 2018-09-20 | Stop reason: HOSPADM

## 2018-09-20 RX ORDER — LEVOFLOXACIN 5 MG/ML
500 INJECTION, SOLUTION INTRAVENOUS EVERY 24 HOURS
Status: DISCONTINUED | OUTPATIENT
Start: 2018-09-20 | End: 2018-09-20

## 2018-09-20 RX ORDER — DEXAMETHASONE SODIUM PHOSPHATE 4 MG/ML
INJECTION, SOLUTION INTRA-ARTICULAR; INTRALESIONAL; INTRAMUSCULAR; INTRAVENOUS; SOFT TISSUE PRN
Status: DISCONTINUED | OUTPATIENT
Start: 2018-09-20 | End: 2018-09-20 | Stop reason: SDUPTHER

## 2018-09-20 RX ORDER — SCOLOPAMINE TRANSDERMAL SYSTEM 1 MG/1
1 PATCH, EXTENDED RELEASE TRANSDERMAL ONCE
Status: DISCONTINUED | OUTPATIENT
Start: 2018-09-20 | End: 2018-09-23

## 2018-09-20 RX ORDER — FENTANYL CITRATE 50 UG/ML
INJECTION, SOLUTION INTRAMUSCULAR; INTRAVENOUS PRN
Status: DISCONTINUED | OUTPATIENT
Start: 2018-09-20 | End: 2018-09-20 | Stop reason: SDUPTHER

## 2018-09-20 RX ORDER — METOCLOPRAMIDE HYDROCHLORIDE 5 MG/ML
10 INJECTION INTRAMUSCULAR; INTRAVENOUS ONCE
Status: DISCONTINUED | OUTPATIENT
Start: 2018-09-20 | End: 2018-09-20 | Stop reason: HOSPADM

## 2018-09-20 RX ORDER — HYDROCODONE BITARTRATE AND ACETAMINOPHEN 5; 325 MG/1; MG/1
1 TABLET ORAL PRN
Status: DISCONTINUED | OUTPATIENT
Start: 2018-09-20 | End: 2018-09-20 | Stop reason: HOSPADM

## 2018-09-20 RX ORDER — ESMOLOL HYDROCHLORIDE 10 MG/ML
INJECTION INTRAVENOUS PRN
Status: DISCONTINUED | OUTPATIENT
Start: 2018-09-20 | End: 2018-09-20 | Stop reason: SDUPTHER

## 2018-09-20 RX ORDER — LABETALOL HYDROCHLORIDE 5 MG/ML
INJECTION, SOLUTION INTRAVENOUS PRN
Status: DISCONTINUED | OUTPATIENT
Start: 2018-09-20 | End: 2018-09-20 | Stop reason: SDUPTHER

## 2018-09-20 RX ORDER — HYDROCODONE BITARTRATE AND ACETAMINOPHEN 5; 325 MG/1; MG/1
2 TABLET ORAL PRN
Status: DISCONTINUED | OUTPATIENT
Start: 2018-09-20 | End: 2018-09-20 | Stop reason: HOSPADM

## 2018-09-20 RX ORDER — METOCLOPRAMIDE HYDROCHLORIDE 5 MG/ML
10 INJECTION INTRAMUSCULAR; INTRAVENOUS
Status: DISCONTINUED | OUTPATIENT
Start: 2018-09-20 | End: 2018-09-20 | Stop reason: HOSPADM

## 2018-09-20 RX ORDER — SUCCINYLCHOLINE CHLORIDE 20 MG/ML
INJECTION INTRAMUSCULAR; INTRAVENOUS PRN
Status: DISCONTINUED | OUTPATIENT
Start: 2018-09-20 | End: 2018-09-20 | Stop reason: SDUPTHER

## 2018-09-20 RX ORDER — PROPOFOL 10 MG/ML
INJECTION, EMULSION INTRAVENOUS PRN
Status: DISCONTINUED | OUTPATIENT
Start: 2018-09-20 | End: 2018-09-20 | Stop reason: SDUPTHER

## 2018-09-20 RX ORDER — ROCURONIUM BROMIDE 10 MG/ML
INJECTION, SOLUTION INTRAVENOUS PRN
Status: DISCONTINUED | OUTPATIENT
Start: 2018-09-20 | End: 2018-09-20 | Stop reason: SDUPTHER

## 2018-09-20 RX ORDER — GLYCOPYRROLATE 1 MG/5 ML
SYRINGE (ML) INTRAVENOUS PRN
Status: DISCONTINUED | OUTPATIENT
Start: 2018-09-20 | End: 2018-09-20 | Stop reason: SDUPTHER

## 2018-09-20 RX ORDER — METOCLOPRAMIDE HYDROCHLORIDE 5 MG/ML
10 INJECTION INTRAMUSCULAR; INTRAVENOUS
Status: COMPLETED | OUTPATIENT
Start: 2018-09-20 | End: 2018-09-20

## 2018-09-20 RX ORDER — ONDANSETRON 2 MG/ML
INJECTION INTRAMUSCULAR; INTRAVENOUS PRN
Status: DISCONTINUED | OUTPATIENT
Start: 2018-09-20 | End: 2018-09-20 | Stop reason: SDUPTHER

## 2018-09-20 RX ORDER — POLYETHYLENE GLYCOL 3350 17 G/17G
17 POWDER, FOR SOLUTION ORAL 2 TIMES DAILY
Status: DISCONTINUED | OUTPATIENT
Start: 2018-09-20 | End: 2018-09-20

## 2018-09-20 RX ORDER — OXYCODONE HYDROCHLORIDE AND ACETAMINOPHEN 5; 325 MG/1; MG/1
2 TABLET ORAL EVERY 4 HOURS PRN
Status: DISCONTINUED | OUTPATIENT
Start: 2018-09-20 | End: 2018-09-23

## 2018-09-20 RX ORDER — SODIUM CHLORIDE 9 MG/ML
INJECTION, SOLUTION INTRAVENOUS CONTINUOUS PRN
Status: DISCONTINUED | OUTPATIENT
Start: 2018-09-20 | End: 2018-09-20 | Stop reason: SDUPTHER

## 2018-09-20 RX ORDER — SODIUM CHLORIDE, SODIUM LACTATE, POTASSIUM CHLORIDE, CALCIUM CHLORIDE 600; 310; 30; 20 MG/100ML; MG/100ML; MG/100ML; MG/100ML
INJECTION, SOLUTION INTRAVENOUS CONTINUOUS PRN
Status: DISCONTINUED | OUTPATIENT
Start: 2018-09-20 | End: 2018-09-20 | Stop reason: SDUPTHER

## 2018-09-20 RX ORDER — LIDOCAINE HYDROCHLORIDE 20 MG/ML
INJECTION, SOLUTION INFILTRATION; PERINEURAL PRN
Status: DISCONTINUED | OUTPATIENT
Start: 2018-09-20 | End: 2018-09-20 | Stop reason: SDUPTHER

## 2018-09-20 RX ADMIN — CEFEPIME HYDROCHLORIDE 2 G: 2 INJECTION, POWDER, FOR SOLUTION INTRAVENOUS at 01:44

## 2018-09-20 RX ADMIN — MEBROFENIN 5 MILLICURIE: 45 INJECTION, POWDER, LYOPHILIZED, FOR SOLUTION INTRAVENOUS at 09:03

## 2018-09-20 RX ADMIN — INSULIN LISPRO 8 UNITS: 100 INJECTION, SOLUTION INTRAVENOUS; SUBCUTANEOUS at 05:51

## 2018-09-20 RX ADMIN — DEXAMETHASONE SODIUM PHOSPHATE 8 MG: 4 INJECTION, SOLUTION INTRAMUSCULAR; INTRAVENOUS at 12:39

## 2018-09-20 RX ADMIN — INSULIN LISPRO 3 UNITS: 100 INJECTION, SOLUTION INTRAVENOUS; SUBCUTANEOUS at 15:00

## 2018-09-20 RX ADMIN — INSULIN LISPRO 8 UNITS: 100 INJECTION, SOLUTION INTRAVENOUS; SUBCUTANEOUS at 01:51

## 2018-09-20 RX ADMIN — Medication 0.6 MG: at 13:58

## 2018-09-20 RX ADMIN — NEOSTIGMINE METHYLSULFATE 3 MG: 1 INJECTION, SOLUTION INTRAMUSCULAR; INTRAVENOUS; SUBCUTANEOUS at 13:58

## 2018-09-20 RX ADMIN — INSULIN HUMAN 10 UNITS: 100 INJECTION, SOLUTION PARENTERAL at 15:07

## 2018-09-20 RX ADMIN — SUCCINYLCHOLINE CHLORIDE 100 MG: 20 INJECTION, SOLUTION INTRAMUSCULAR; INTRAVENOUS at 12:30

## 2018-09-20 RX ADMIN — FENTANYL CITRATE 50 MCG: 50 INJECTION INTRAMUSCULAR; INTRAVENOUS at 12:30

## 2018-09-20 RX ADMIN — ENALAPRIL MALEATE 2.5 MG: 2.5 TABLET ORAL at 17:42

## 2018-09-20 RX ADMIN — FAMOTIDINE 20 MG: 10 INJECTION INTRAVENOUS at 12:18

## 2018-09-20 RX ADMIN — INSULIN HUMAN 10 UNITS: 100 INJECTION, SOLUTION PARENTERAL at 14:26

## 2018-09-20 RX ADMIN — OXYCODONE HYDROCHLORIDE AND ACETAMINOPHEN 2 TABLET: 5; 325 TABLET ORAL at 23:07

## 2018-09-20 RX ADMIN — SODIUM CHLORIDE 1.53 MCG: 9 INJECTION, SOLUTION INTRAVENOUS at 10:09

## 2018-09-20 RX ADMIN — FENTANYL CITRATE 50 MCG: 50 INJECTION INTRAMUSCULAR; INTRAVENOUS at 12:24

## 2018-09-20 RX ADMIN — DILTIAZEM HYDROCHLORIDE 360 MG: 180 CAPSULE, COATED, EXTENDED RELEASE ORAL at 17:41

## 2018-09-20 RX ADMIN — METRONIDAZOLE 500 MG: 500 INJECTION, SOLUTION INTRAVENOUS at 19:04

## 2018-09-20 RX ADMIN — SODIUM CHLORIDE, POTASSIUM CHLORIDE, SODIUM LACTATE AND CALCIUM CHLORIDE: 600; 310; 30; 20 INJECTION, SOLUTION INTRAVENOUS at 12:34

## 2018-09-20 RX ADMIN — PROPOFOL 200 MG: 10 INJECTION, EMULSION INTRAVENOUS at 12:30

## 2018-09-20 RX ADMIN — SODIUM CHLORIDE: 9 INJECTION, SOLUTION INTRAVENOUS at 12:24

## 2018-09-20 RX ADMIN — LORAZEPAM 0.5 MG: 0.5 TABLET ORAL at 06:51

## 2018-09-20 RX ADMIN — INSULIN LISPRO 12 UNITS: 100 INJECTION, SOLUTION INTRAVENOUS; SUBCUTANEOUS at 19:54

## 2018-09-20 RX ADMIN — INSULIN GLARGINE 15 UNITS: 100 INJECTION, SOLUTION SUBCUTANEOUS at 22:13

## 2018-09-20 RX ADMIN — ONDANSETRON 4 MG: 2 INJECTION INTRAMUSCULAR; INTRAVENOUS at 07:00

## 2018-09-20 RX ADMIN — LIDOCAINE HYDROCHLORIDE 60 MG: 20 INJECTION, SOLUTION INFILTRATION; PERINEURAL at 12:30

## 2018-09-20 RX ADMIN — ESMOLOL HYDROCHLORIDE 60 MG: 10 INJECTION INTRAVENOUS at 12:50

## 2018-09-20 RX ADMIN — Medication 30 MG: at 12:35

## 2018-09-20 RX ADMIN — INSULIN LISPRO 15 UNITS: 100 INJECTION, SOLUTION INTRAVENOUS; SUBCUTANEOUS at 15:48

## 2018-09-20 RX ADMIN — HYDROMORPHONE HYDROCHLORIDE 0.5 MG: 2 INJECTION, SOLUTION INTRAMUSCULAR; INTRAVENOUS; SUBCUTANEOUS at 07:04

## 2018-09-20 RX ADMIN — MIDAZOLAM HYDROCHLORIDE 2 MG: 1 INJECTION, SOLUTION INTRAMUSCULAR; INTRAVENOUS at 12:24

## 2018-09-20 RX ADMIN — HYDROMORPHONE HYDROCHLORIDE 0.5 MG: 2 INJECTION, SOLUTION INTRAMUSCULAR; INTRAVENOUS; SUBCUTANEOUS at 15:51

## 2018-09-20 RX ADMIN — ONDANSETRON 4 MG: 2 INJECTION INTRAMUSCULAR; INTRAVENOUS at 14:00

## 2018-09-20 RX ADMIN — Medication 10 ML: at 19:04

## 2018-09-20 RX ADMIN — INSULIN HUMAN 10 UNITS: 100 INJECTION, SOLUTION PARENTERAL at 12:12

## 2018-09-20 RX ADMIN — METOCLOPRAMIDE 10 MG: 5 INJECTION, SOLUTION INTRAMUSCULAR; INTRAVENOUS at 12:18

## 2018-09-20 RX ADMIN — ATORVASTATIN CALCIUM 10 MG: 10 TABLET, FILM COATED ORAL at 17:42

## 2018-09-20 RX ADMIN — METRONIDAZOLE 500 MG: 500 INJECTION, SOLUTION INTRAVENOUS at 11:32

## 2018-09-20 RX ADMIN — INSULIN LISPRO 9 UNITS: 100 INJECTION, SOLUTION INTRAVENOUS; SUBCUTANEOUS at 23:56

## 2018-09-20 RX ADMIN — ACETAMINOPHEN 1000 MG: 500 TABLET ORAL at 00:09

## 2018-09-20 RX ADMIN — ENOXAPARIN SODIUM 40 MG: 40 INJECTION SUBCUTANEOUS at 21:36

## 2018-09-20 RX ADMIN — Medication 20 MG: at 12:49

## 2018-09-20 RX ADMIN — IOPAMIDOL 75 ML: 755 INJECTION, SOLUTION INTRAVENOUS at 08:36

## 2018-09-20 RX ADMIN — HYDROMORPHONE HYDROCHLORIDE: 2 INJECTION, SOLUTION INTRAMUSCULAR; INTRAVENOUS; SUBCUTANEOUS at 01:06

## 2018-09-20 RX ADMIN — INSULIN HUMAN 20 UNITS: 100 INJECTION, SOLUTION PARENTERAL at 13:14

## 2018-09-20 RX ADMIN — SODIUM CHLORIDE: 9 INJECTION, SOLUTION INTRAVENOUS at 01:10

## 2018-09-20 RX ADMIN — CEFEPIME HYDROCHLORIDE 2 G: 2 INJECTION, POWDER, FOR SOLUTION INTRAVENOUS at 16:21

## 2018-09-20 RX ADMIN — ONDANSETRON 4 MG: 2 INJECTION INTRAMUSCULAR; INTRAVENOUS at 15:57

## 2018-09-20 RX ADMIN — LABETALOL HYDROCHLORIDE 5 MG: 5 INJECTION, SOLUTION INTRAVENOUS at 13:10

## 2018-09-20 RX ADMIN — HYDROMORPHONE HYDROCHLORIDE 0.5 MG: 2 INJECTION, SOLUTION INTRAMUSCULAR; INTRAVENOUS; SUBCUTANEOUS at 19:03

## 2018-09-20 ASSESSMENT — PULMONARY FUNCTION TESTS
PIF_VALUE: 26
PIF_VALUE: 22
PIF_VALUE: 26
PIF_VALUE: 21
PIF_VALUE: 25
PIF_VALUE: 26
PIF_VALUE: 27
PIF_VALUE: 26
PIF_VALUE: 23
PIF_VALUE: 25
PIF_VALUE: 3
PIF_VALUE: 27
PIF_VALUE: 28
PIF_VALUE: 3
PIF_VALUE: 15
PIF_VALUE: 14
PIF_VALUE: 26
PIF_VALUE: 15
PIF_VALUE: 25
PIF_VALUE: 4
PIF_VALUE: 1
PIF_VALUE: 15
PIF_VALUE: 30
PIF_VALUE: 5
PIF_VALUE: 25
PIF_VALUE: 26
PIF_VALUE: 15
PIF_VALUE: 26
PIF_VALUE: 27
PIF_VALUE: 27
PIF_VALUE: 26
PIF_VALUE: 27
PIF_VALUE: 26
PIF_VALUE: 27
PIF_VALUE: 27
PIF_VALUE: 18
PIF_VALUE: 27
PIF_VALUE: 4
PIF_VALUE: 4
PIF_VALUE: 23
PIF_VALUE: 15
PIF_VALUE: 27
PIF_VALUE: 25
PIF_VALUE: 26
PIF_VALUE: 15
PIF_VALUE: 17
PIF_VALUE: 27
PIF_VALUE: 1
PIF_VALUE: 26
PIF_VALUE: 16
PIF_VALUE: 19
PIF_VALUE: 15
PIF_VALUE: 27
PIF_VALUE: 20
PIF_VALUE: 15
PIF_VALUE: 27
PIF_VALUE: 23
PIF_VALUE: 12
PIF_VALUE: 26
PIF_VALUE: 20
PIF_VALUE: 15
PIF_VALUE: 22
PIF_VALUE: 27
PIF_VALUE: 27
PIF_VALUE: 16
PIF_VALUE: 26
PIF_VALUE: 26
PIF_VALUE: 15
PIF_VALUE: 26
PIF_VALUE: 27
PIF_VALUE: 26
PIF_VALUE: 27
PIF_VALUE: 25
PIF_VALUE: 15
PIF_VALUE: 26
PIF_VALUE: 27
PIF_VALUE: 26
PIF_VALUE: 23
PIF_VALUE: 27
PIF_VALUE: 4
PIF_VALUE: 22
PIF_VALUE: 15
PIF_VALUE: 25
PIF_VALUE: 1
PIF_VALUE: 26
PIF_VALUE: 26
PIF_VALUE: 27
PIF_VALUE: 27
PIF_VALUE: 15
PIF_VALUE: 18
PIF_VALUE: 26

## 2018-09-20 ASSESSMENT — PAIN SCALES - GENERAL
PAINLEVEL_OUTOF10: 7
PAINLEVEL_OUTOF10: 10
PAINLEVEL_OUTOF10: 8
PAINLEVEL_OUTOF10: 4
PAINLEVEL_OUTOF10: 7
PAINLEVEL_OUTOF10: 7
PAINLEVEL_OUTOF10: 10
PAINLEVEL_OUTOF10: 0
PAINLEVEL_OUTOF10: 4
PAINLEVEL_OUTOF10: 7

## 2018-09-20 ASSESSMENT — PAIN DESCRIPTION - DESCRIPTORS
DESCRIPTORS: ACHING
DESCRIPTORS: ACHING;CONSTANT;TENDER

## 2018-09-20 ASSESSMENT — PAIN DESCRIPTION - LOCATION
LOCATION: BACK
LOCATION: ABDOMEN
LOCATION: BACK
LOCATION: ABDOMEN
LOCATION: ABDOMEN
LOCATION: BACK

## 2018-09-20 ASSESSMENT — PAIN DESCRIPTION - ORIENTATION
ORIENTATION: RIGHT
ORIENTATION: RIGHT;LEFT;MID;ANTERIOR
ORIENTATION: RIGHT
ORIENTATION: RIGHT
ORIENTATION: RIGHT;MID;POSTERIOR

## 2018-09-20 ASSESSMENT — PAIN DESCRIPTION - PAIN TYPE
TYPE: ACUTE PAIN
TYPE: ACUTE PAIN
TYPE: ACUTE PAIN;SURGICAL PAIN
TYPE: ACUTE PAIN;SURGICAL PAIN
TYPE: ACUTE PAIN

## 2018-09-20 ASSESSMENT — PAIN DESCRIPTION - FREQUENCY
FREQUENCY: CONTINUOUS
FREQUENCY: CONTINUOUS

## 2018-09-20 ASSESSMENT — PAIN DESCRIPTION - ONSET
ONSET: PROGRESSIVE
ONSET: ON-GOING

## 2018-09-20 ASSESSMENT — PAIN DESCRIPTION - PROGRESSION
CLINICAL_PROGRESSION: GRADUALLY IMPROVING
CLINICAL_PROGRESSION: NOT CHANGED

## 2018-09-20 ASSESSMENT — LIFESTYLE VARIABLES: SMOKING_STATUS: 1

## 2018-09-20 NOTE — ANESTHESIA PRE PROCEDURE
Department of Anesthesiology  Preprocedure Note       Name:  Francis Villafuerte   Age:  63 y.o.  :  1960                                          MRN:  [id number]         Date:  2018      Surgeon: Aureliano Unger):  Meghana Ramírez DO    Procedure: Procedure(s):  CHOLECYSTECTOMY LAPAROSCOPIC ROBOTIC    Medications prior to admission:   Prior to Admission medications    Medication Sig Start Date End Date Taking? Authorizing Provider   diltiazem (CARDIZEM CD) 360 MG extended release capsule Take 1 capsule by mouth daily 9/10/18   Priscilla Ariza MD   ADDERALL XR 30 MG capsule 1 tab at 6 am, 1 tab at noon  MAURY. Earliest Fill Date: 9/5/18 9/5/18 10/6/18  Kelby Omer MD   insulin aspart (NOVOLOG) 100 UNIT/ML injection vial 100 units qd per pump 8/3/18   Kelby Omer MD   gabapentin (NEURONTIN) 100 MG capsule Take 1 capsule by mouth 2 times daily for 30 days. . 18  Kelby Omer MD   cyclobenzaprine (FLEXERIL) 10 MG tablet Take 1 tablet by mouth 3 times daily as needed for Muscle spasms 18   Kelby Omer MD   enalapril (VASOTEC) 2.5 MG tablet Take 1 tablet by mouth daily 18   Kelby Omer MD   atorvastatin (LIPITOR) 10 MG tablet Take 1 tablet by mouth daily 18   Kelby Omer MD   fluocinonide (LIDEX) 0.05 % cream Apply topically 2 times daily. Patient taking differently: as needed Apply topically 2 times daily.  17   Kelby Omer MD       Current medications:    Current Facility-Administered Medications   Medication Dose Route Frequency Provider Last Rate Last Dose    [MAR Hold] metronidazole (FLAGYL) 500 mg in NaCl 100 mL IVPB premix  500 mg Intravenous Q8H Kelby Omer  mL/hr at 18 1132 500 mg at 18 1132    [MAR Hold] levofloxacin (LEVAQUIN) 500 MG/100ML infusion 500 mg  500 mg Intravenous Q24H Kelby Omer MD        insulin regular (HUMULIN R;NOVOLIN R) injection 10 Units  10 Units Subcutaneous Once Vibha Roberts DO        [facility] Hold] -polyp(adenomatous polyp)    EYE SURGERY Bilateral 2007    ROTATOR CUFF REPAIR Left 2009    TUBAL LIGATION  1993       Social History:    Social History   Substance Use Topics    Smoking status: Former Smoker    Smokeless tobacco: Never Used    Alcohol use Yes      Comment: social                                Counseling given: Not Answered      Vital Signs (Current):   Vitals:    09/20/18 0557 09/20/18 0704 09/20/18 0907 09/20/18 1200   BP:  (!) 102/50  (!) 152/66   Pulse: 75 75  91   Resp:  14  20   Temp:  37 °C (98.6 °F)  37.6 °C (99.7 °F)   TempSrc:  Temporal  Temporal   SpO2:  95%  100%   Weight:   169 lb (76.7 kg)    Height:   5' 5\" (1.651 m)                                               BP Readings from Last 3 Encounters:   09/20/18 (!) 152/66   09/10/18 (!) 158/93   04/17/18 129/74       NPO Status: Time of last liquid consumption: 0700                        Time of last solid consumption: 2200                        Date of last liquid consumption: 09/20/18                        Date of last solid food consumption: 09/20/18    BMI:   Wt Readings from Last 3 Encounters:   09/20/18 169 lb (76.7 kg)   09/10/18 158 lb (71.7 kg)   04/17/18 155 lb 8 oz (70.5 kg)     Body mass index is 28.12 kg/m².     CBC:   Lab Results   Component Value Date    WBC 17.6 09/20/2018    RBC 3.58 09/20/2018    HGB 11.4 09/20/2018    HCT 36.1 09/20/2018    .8 09/20/2018    RDW 13.0 09/20/2018     09/20/2018       CMP:   Lab Results   Component Value Date     09/20/2018    K 4.9 09/20/2018     09/20/2018    CO2 14 09/20/2018    BUN 22 09/20/2018    CREATININE 0.87 09/20/2018    GFRAA >60 09/20/2018    LABGLOM >60 09/20/2018    GLUCOSE 340 09/20/2018    PROT 6.0 09/20/2018    CALCIUM 8.5 09/20/2018    BILITOT 0.67 09/20/2018    ALKPHOS 212 09/20/2018    AST 77 09/20/2018    ALT 70 09/20/2018       POC Tests:   Recent Labs      09/20/18   0547   POCGLU  330*       Coags: No results found for: PROTIME, INR, APTT    HCG (If Applicable): No results found for: PREGTESTUR, PREGSERUM, HCG, HCGQUANT     ABGs: No results found for: PHART, PO2ART, BZN2GAE, ZXC8STB, BEART, S5JVTGXX     Type & Screen (If Applicable):  No results found for: LABABO, [address]    Anesthesia Evaluation  Patient summary reviewed   history of anesthetic complications: PONV. Airway: Mallampati: III  TM distance: >3 FB   Neck ROM: full  Mouth opening: < 3 FB Dental: normal exam         Pulmonary: breath sounds clear to auscultation  (+) current smoker                           Cardiovascular:  Exercise tolerance: good (>4 METS),         ECG reviewed  Rhythm: regular  Rate: normal  Echocardiogram reviewed         Beta Blocker:  Not on Beta Blocker         Neuro/Psych:   Negative Neuro/Psych ROS              GI/Hepatic/Renal:             Endo/Other:    (+) DiabetesType I DM, poorly controlled, , .                 Abdominal:       Abdomen: soft and tender. Vascular: negative vascular ROS. Anesthesia Plan      general     ASA 3 - emergent       Induction: intravenous. MIPS: Prophylactic antiemetics administered. Anesthetic plan and risks discussed with patient. Plan discussed with CRNA.                   WILMER Reyes - CRNA   9/20/2018

## 2018-09-20 NOTE — PLAN OF CARE
Problem: Pain:  Goal: Pain level will decrease  Pain level will decrease   Outcome: Ongoing  Assess pain every 4 hours and prn using a 0-10 scale. Teach patient adverse complication of uncontrolled pain. Plan patients day so that aggravating activities coincide with peak of analgesic. Patients should be medicated before procedures and activities that incite pain to prevent spikes in pain. Provide patient with distractions of choice such as TV, music or reading. Discuss with patient what a tolerable pain rating would be and work towards that and not just eliminating all pain. Problem: Infection, Septic Shock:  Goal: Will show no infection signs and symptoms  Will show no infection signs and symptoms   Outcome: Ongoing  Vitals are stable. Will continue to monitor. Problem: Serum Glucose Level - Abnormal:  Goal: Ability to maintain appropriate glucose levels will improve  Ability to maintain appropriate glucose levels will improve   Outcome: Ongoing  Monitoring glucose every 4 hours with coverage. Will continue to monitor.

## 2018-09-20 NOTE — PROGRESS NOTES
9/20/18   8:30am    Spoke with Dr. Anjelica Guevara this am regarding patient history/symtoms/labs. Stat HIDA ordered to check for duct obstruction. Discussed with Stacie Urena. Full consult/evaluation to follow. Also patient going now to get US RUQ and CT A/P.     Dr. Lyly Dooley

## 2018-09-20 NOTE — PROGRESS NOTES
9/20/18 6:52 pm    Called RN to check on patient. No fevers. Pain reasonably controlled. Tolerating liquids. Ok to slowly advance diet as tolerated. Urology consult reviewed. No acute intraabdominal findings noted.      Dr. Madyson Mireles

## 2018-09-20 NOTE — CONSULTS
Past Surgical History:   Procedure Laterality Date    CHOLECYSTECTOMY  09/20/2018    CHOLECYSTECTOMY LAPAROSCOPIC ROBOTIC (N/A )    COLONOSCOPY  01/11/2017    -polyp(adenomatous polyp)    EYE SURGERY Bilateral 2007    ROTATOR CUFF REPAIR Left 2009    TUBAL LIGATION  1993     Previous  surgery: none   Medications:    Scheduled Meds:   [MAR Hold] metroNIDAZOLE  500 mg Intravenous Q8H    [MAR Hold] levofloxacin  500 mg Intravenous Q24H    scopolamine  1 patch Transdermal Once    [MAR Hold] atorvastatin  10 mg Oral Daily    [MAR Hold] diltiazem  360 mg Oral Daily    [MAR Hold] enalapril  2.5 mg Oral Daily    [MAR Hold] sodium chloride flush  10 mL Intravenous 2 times per day    [MAR Hold] enoxaparin  40 mg Subcutaneous Daily    [MAR Hold] pantoprazole  40 mg Oral Daily    [MAR Hold] insulin lispro  0-12 Units Subcutaneous Q4H    [MAR Hold] influenza virus vaccine  0.5 mL Intramuscular Prior to discharge    [MAR Hold] pneumococcal 13-valent conjugate  0.5 mL Intramuscular Prior to discharge     Continuous Infusions:   [facility] Hold] sodium chloride 50 mL/hr at 09/20/18 0720    [MAR Hold] dextrose       PRN Meds:. [MAR Hold] cyclobenzaprine, [MAR Hold] sodium chloride flush, [MAR Hold] glucose, [MAR Hold] dextrose, [MAR Hold] glucagon (rDNA), [MAR Hold] dextrose, [MAR Hold] HYDROmorphone **OR** [MAR Hold] HYDROmorphone, [MAR Hold] ondansetron, [MAR Hold] acetaminophen, [MAR Hold] LORazepam    Allergies:  Bee venom; Sulfa antibiotics; and Macrobid [nitrofurantoin macrocrystal]    Social History:    Social History     Social History    Marital status:      Spouse name: N/A    Number of children: N/A    Years of education: N/A     Occupational History    Not on file.      Social History Main Topics    Smoking status: Former Smoker    Smokeless tobacco: Never Used    Alcohol use Yes      Comment: social    Drug use: No    Sexual activity: Not on file     Other Topics Concern    Not normal  HEENT negative  Lungs: Respiratory effort is normal  Cardiovascular: Normal peripheral pulses  Abdomen: Soft, non-tender, non-distended with no CVA, flank pain or hepatosplenomegaly. No hernias. Kidneys normal.  Lymphatics: No palpable lymphadenopathy. Bladder non-tender and not distended. Pelvic exam: deferred  Rectal exam not indicated    LABS:   Recent Labs      09/19/18   0153  09/19/18   0511  09/20/18   0549   WBC  10.0  11.3  17.6*   HGB  12.4  11.7*  11.4*   HCT  37.1  36.3  36.1*   MCV  95.1  98.4  100.8   PLT  See Reflexed IPF Result  223  226     Recent Labs      09/19/18   0153  09/19/18   0511  09/20/18   0549   NA  134*  132*  133*   K  4.0  3.9  4.9   CL  97*  99  101   CO2  23  19*  14*   BUN  19  15  22*   CREATININE  0.70  0.64  0.87       Additional Lab/culture results:    Urinalysis: Recent Labs      09/19/18   0250   COLORU  YELLOW   PHUR  6.0   WBCUA  50    RBCUA  2 TO 5   MUCUS  NOT REPORTED   TRICHOMONAS  NOT REPORTED   YEAST  NOT REPORTED   BACTERIA  1+*   SPECGRAV  1.020   LEUKOCYTESUR  MODERATE*   UROBILINOGEN  Normal   BILIRUBINUR  NEGATIVE        -----------------------------------------------------------------  Imaging Results:      Assessment and Plan   Impression:    Patient Active Problem List   Diagnosis    Colon cancer screening    Family history of colon cancer    Colon polyp    Diabetic ketoacidosis without coma associated with type 1 diabetes mellitus ([address]    Mild malnutrition ([address]    Frequent PVCs    DM (diabetes mellitus), type 1 ([address]    Sepsis ([address]       Plan: Continue broad-spectrum antibiotics. If patient's clinical situation does not improve we can order a Lasix renogram.  Will need to see how she revoers from surgery to better assess pain.     Kell Millard  3:30 PM 9/20/2018

## 2018-09-20 NOTE — PROGRESS NOTES
Progress Note    SUBJECTIVE:  Patient seen for sepsis, probably urinary source. Says she feels more pain today in right flank and RLQ. Temp up a little overnight., pain helped with dilaudid. BC noted. ROS:   Constitutional: positive  for fevers, and negative for chills. Respiratory: negative for shortness of breath, negative for cough, and negative for wheezing  Cardiovascular: negative for chest pain, and negative for palpitations  Gastrointestinal: positive for abdominal pain, positive for nausea,negative for vomiting, negative for diarrhea, and positive for constipation   Also, + for right flank pain  All other systems were reviewed with the patient and are negative unless otherwise stated in HPI    OBJECTIVE:    EXAM:  Vitals:    09/20/18 0500   BP: (!) 104/44   Pulse: 67   Resp:    Temp:    SpO2:       Weight: 169 lb 4.8 oz (76.8 kg)    Height: 5' 5\" (165.1 cm)     GEN:   A & O x3, no apparent distress  EYES: No gross abnormalities. , PERRL and EOMI  NECK: normal, supple, no lymphadenopathy,   PULM: clear to auscultation bilaterally- no wheezes, rales or rhonchi, normal air movement, no respiratory distress  COR: regular rate & rhythm, no murmurs and no gallops  ABD:  Tender right mid and lower abd, right flank  EXT:   no cyanosis, clubbing or edema present    NEURO: negative  SKIN:  no rashes or significant lesions    microbiology: blood culture: positive for gm neg sky    CBC with Differential:    Lab Results   Component Value Date    WBC 17.6 09/20/2018    RBC 3.58 09/20/2018    HGB 11.4 09/20/2018    HCT 36.1 09/20/2018     09/20/2018    .8 09/20/2018    MCH 31.8 09/20/2018    MCHC 31.6 09/20/2018    RDW 13.0 09/20/2018    METASPCT 1 09/20/2018    LYMPHOPCT 10 09/20/2018    MONOPCT 7 09/20/2018    BASOPCT 1 09/20/2018    MONOSABS 1.23 09/20/2018    LYMPHSABS 1.76 09/20/2018    EOSABS 0.18 09/20/2018    BASOSABS 0.18 09/20/2018    DIFFTYPE NOT REPORTED 09/20/2018     CMP:    Lab Results

## 2018-09-20 NOTE — OP NOTE
the procedure well and was extubated and transported to the recovery room in stable condition. I spoke with family afterwards.     Electronically signed by Anne-Marie Thompson DO on 9/20/2018 at 2:14 PM

## 2018-09-20 NOTE — CONSULTS
GENERAL SURGERY CONSULTATION- Inpatient      Patient's Name/ Date of Birth/ Gender: Ledy Burton / 1960 (63 y.o.) / female     PCP: Dr. Radha Schwartz    History of present Illness:  Patient is a pleasant 64 yo female with sepsis whose workup is clinically acute cholecystitis. She is type I diabetic, has insulin pump lower abdomen. Discussed with Dr. Radha Schwartz earlier today. No jaundice. Nonsmoker nondrinker. + fevers. No melena or hematochezia. + constipation chronic. Imaging, labs reviewed. Urology spoke with PCP earlier today as well. + blood cultures. Increasing leukocytosis. + Stovall's. The HIDA did not show cystic duct obstruction but per review of CT and US and clinically, cholecystitis high suspicion, especially in type 1 diabetic. PCP discussed with radiology the imaging personally as well. Past Medical History:  has a past medical history of Diabetes mellitus ([address] H/O cardiovascular stress test; History of Holter monitoring; History of Holter monitoring; and PONV (postoperative nausea and vomiting). Past Surgical History:   Past Surgical History:   Procedure Laterality Date    COLONOSCOPY  01/11/2017    -polyp(adenomatous polyp)    EYE SURGERY Bilateral 2007    ROTATOR CUFF REPAIR Left 2009    TUBAL LIGATION  1993       Social History:  reports that she has quit smoking. She has never used smokeless tobacco. She reports that she drinks alcohol. She reports that she does not use drugs. Family History: family history includes Heart Attack (age of onset: 48) in her brother. Review of Systems:   General: Denies fever, chills, night sweats, weight loss, malaise, fatigue  HEENT: Denies sore throat, sinus problems, allergic rhinosinusitis  Card: Denies chest pain, palpitations, orthopnea/PND. HTN. Pulm: Denies cough, shortness of breath, dyspnea on exertion  GI:  per HPI. : history of UTIs   Endo: DM type 1, insulin pump.  Hx DKA  Heme: neg  Neuro: Denies h/o CVA, TIA  Skin: Denies rashes, Intravenous, Q3H PRN **OR** HYDROmorphone (DILAUDID) injection 0.5 mg, 0.5 mg, Intravenous, Q3H PRN, Adelaida Gagnon MD, 0.5 mg at 09/20/18 0704    ondansetron [facility]) injection 4 mg, 4 mg, Intravenous, Q6H PRN, Adelaida Gagnon MD, 4 mg at 09/20/18 0700    acetaminophen (TYLENOL) tablet 1,000 mg, 1,000 mg, Oral, Q6H PRN, Adelaida Gagnon MD, 1,000 mg at 09/20/18 0009    LORazepam (ATIVAN) tablet 0.5 mg, 0.5 mg, Oral, TID PRN, Adelaida Gagnon MD, 0.5 mg at 09/20/18 8934    influenza quadrivalent split vaccine (FLUZONE;FLUARIX;FLULAVAL;AFLURIA) injection 0.5 mL, 0.5 mL, Intramuscular, Prior to discharge, Adelaida Gagnon MD    pneumococcal 13-valent conjugate (PREVNAR) injection 0.5 mL, 0.5 mL, Intramuscular, Prior to discharge, Adelaida Gagnon MD    Physical Exam:  Vital signs and Nurse's note reviewed  Gen:  A&Ox3, moderate distress due to discomfort. Pleasant and cooperative.   HEENT:  EOMI, no scleral icterus  Neck: no goiter  CVS: Regular rate and rhythm  Resp:  no active wheezing, no labored breathing  Abd: soft,upper abdomen tenderness [place], non-distended,insulin pump below umbilicus  Ext: Moves all extremities, no gross focal motor deficits  Skin: No erythema or ulcerations     Labs:   Lab Results   Component Value Date    WBC 17.6 09/20/2018    HGB 11.4 09/20/2018    HCT 36.1 09/20/2018    .8 09/20/2018     09/20/2018     Lab Results   Component Value Date     09/20/2018    K 4.9 09/20/2018     09/20/2018    CO2 14 09/20/2018    BUN 22 09/20/2018    CREATININE 0.87 09/20/2018    GLUCOSE 340 09/20/2018    CALCIUM 8.5 09/20/2018     Lab Results   Component Value Date    ALKPHOS 212 09/20/2018    ALT 70 09/20/2018    AST 77 09/20/2018    PROT 6.0 09/20/2018    BILITOT 0.67 09/20/2018    LABALBU 3.1 09/20/2018     Lab Results   Component Value Date    LACTA 1.4 09/20/2018     Lab Results   Component Value Date    AMYLASE 24 10/22/2017     Lab Results   Component Value Date

## 2018-09-21 PROBLEM — K59.09 OTHER CONSTIPATION: Status: ACTIVE | Noted: 2018-09-21

## 2018-09-21 PROBLEM — N39.0 UTI DUE TO EXTENDED-SPECTRUM BETA LACTAMASE (ESBL) PRODUCING ESCHERICHIA COLI: Status: ACTIVE | Noted: 2018-09-21

## 2018-09-21 PROBLEM — B96.29 UTI DUE TO EXTENDED-SPECTRUM BETA LACTAMASE (ESBL) PRODUCING ESCHERICHIA COLI: Status: ACTIVE | Noted: 2018-09-21

## 2018-09-21 PROBLEM — Z16.12 UTI DUE TO EXTENDED-SPECTRUM BETA LACTAMASE (ESBL) PRODUCING ESCHERICHIA COLI: Status: ACTIVE | Noted: 2018-09-21

## 2018-09-21 PROBLEM — K81.1 CHRONIC CHOLECYSTITIS: Status: ACTIVE | Noted: 2018-09-21

## 2018-09-21 PROBLEM — Z90.49 S/P CHOLECYSTECTOMY: Status: ACTIVE | Noted: 2018-09-21

## 2018-09-21 LAB
ABSOLUTE EOS #: 0.15 K/UL (ref 0–0.44)
ABSOLUTE IMMATURE GRANULOCYTE: 0.15 K/UL (ref 0–0.3)
ABSOLUTE LYMPH #: 0.46 K/UL (ref 1.1–3.7)
ABSOLUTE MONO #: 0.61 K/UL (ref 0.1–1.2)
ALBUMIN SERPL-MCNC: 2.9 G/DL (ref 3.5–5.2)
ALBUMIN/GLOBULIN RATIO: 1.1 (ref 1–2.5)
ALP BLD-CCNC: 183 U/L (ref 35–104)
ALT SERPL-CCNC: 116 U/L (ref 5–33)
ANION GAP SERPL CALCULATED.3IONS-SCNC: 9 MMOL/L (ref 9–17)
AST SERPL-CCNC: 112 U/L
ATYPICAL LYMPHOCYTE ABSOLUTE COUNT: 0.15 K/UL
ATYPICAL LYMPHOCYTES: 1 %
BASOPHILS # BLD: 0 % (ref 0–2)
BASOPHILS ABSOLUTE: 0 K/UL (ref 0–0.2)
BILIRUB SERPL-MCNC: 0.24 MG/DL (ref 0.3–1.2)
BUN BLDV-MCNC: 19 MG/DL (ref 6–20)
BUN/CREAT BLD: 23 (ref 9–20)
CALCIUM SERPL-MCNC: 8.7 MG/DL (ref 8.6–10.4)
CHLORIDE BLD-SCNC: 106 MMOL/L (ref 98–107)
CO2: 21 MMOL/L (ref 20–31)
CREAT SERPL-MCNC: 0.81 MG/DL (ref 0.5–0.9)
DIFFERENTIAL TYPE: ABNORMAL
EOSINOPHILS RELATIVE PERCENT: 1 % (ref 1–4)
GFR AFRICAN AMERICAN: >60 ML/MIN
GFR NON-AFRICAN AMERICAN: >60 ML/MIN
GFR SERPL CREATININE-BSD FRML MDRD: ABNORMAL ML/MIN/{1.73_M2}
GFR SERPL CREATININE-BSD FRML MDRD: ABNORMAL ML/MIN/{1.73_M2}
GLUCOSE BLD-MCNC: 246 MG/DL (ref 74–100)
GLUCOSE BLD-MCNC: 273 MG/DL (ref 74–100)
GLUCOSE BLD-MCNC: 275 MG/DL (ref 70–99)
GLUCOSE BLD-MCNC: 276 MG/DL (ref 74–100)
GLUCOSE BLD-MCNC: 277 MG/DL (ref 74–100)
GLUCOSE BLD-MCNC: 280 MG/DL (ref 74–100)
GLUCOSE BLD-MCNC: 298 MG/DL (ref 74–100)
HCT VFR BLD CALC: 31.8 % (ref 36.3–47.1)
HEMOGLOBIN: 10.5 G/DL (ref 11.9–15.1)
IMMATURE GRANULOCYTES: 1 %
LACTIC ACID, WHOLE BLOOD: NORMAL MMOL/L (ref 0.7–2.1)
LACTIC ACID: 1.2 MMOL/L (ref 0.5–2.2)
LYMPHOCYTES # BLD: 3 % (ref 24–43)
MCH RBC QN AUTO: 32.1 PG (ref 25.2–33.5)
MCHC RBC AUTO-ENTMCNC: 33 G/DL (ref 28.4–34.8)
MCV RBC AUTO: 97.2 FL (ref 82.6–102.9)
MONOCYTES # BLD: 4 % (ref 3–12)
MORPHOLOGY: NORMAL
NRBC AUTOMATED: 0 PER 100 WBC
PDW BLD-RTO: 13.1 % (ref 11.8–14.4)
PLATELET # BLD: 256 K/UL (ref 138–453)
PLATELET ESTIMATE: ABNORMAL
PMV BLD AUTO: 9.8 FL (ref 8.1–13.5)
POTASSIUM SERPL-SCNC: 4.9 MMOL/L (ref 3.7–5.3)
PROCALCITONIN: 1.13 NG/ML
RBC # BLD: 3.27 M/UL (ref 3.95–5.11)
RBC # BLD: ABNORMAL 10*6/UL
SEG NEUTROPHILS: 90 % (ref 36–65)
SEGMENTED NEUTROPHILS ABSOLUTE COUNT: 13.78 K/UL (ref 1.5–8.1)
SODIUM BLD-SCNC: 136 MMOL/L (ref 135–144)
TOTAL PROTEIN: 5.6 G/DL (ref 6.4–8.3)
WBC # BLD: 15.3 K/UL (ref 3.5–11.3)
WBC # BLD: ABNORMAL 10*3/UL

## 2018-09-21 PROCEDURE — 6370000000 HC RX 637 (ALT 250 FOR IP): Performed by: NURSE PRACTITIONER

## 2018-09-21 PROCEDURE — 6370000000 HC RX 637 (ALT 250 FOR IP): Performed by: INTERNAL MEDICINE

## 2018-09-21 PROCEDURE — 2500000003 HC RX 250 WO HCPCS: Performed by: INTERNAL MEDICINE

## 2018-09-21 PROCEDURE — 6360000002 HC RX W HCPCS: Performed by: INTERNAL MEDICINE

## 2018-09-21 PROCEDURE — 99024 POSTOP FOLLOW-UP VISIT: CPT | Performed by: SURGERY

## 2018-09-21 PROCEDURE — 6370000000 HC RX 637 (ALT 250 FOR IP): Performed by: PHYSICIAN ASSISTANT

## 2018-09-21 PROCEDURE — 82947 ASSAY GLUCOSE BLOOD QUANT: CPT

## 2018-09-21 PROCEDURE — 85025 COMPLETE CBC W/AUTO DIFF WBC: CPT

## 2018-09-21 PROCEDURE — 84145 PROCALCITONIN (PCT): CPT

## 2018-09-21 PROCEDURE — 6360000002 HC RX W HCPCS: Performed by: PHYSICIAN ASSISTANT

## 2018-09-21 PROCEDURE — 2000000000 HC ICU R&B

## 2018-09-21 PROCEDURE — 36415 COLL VENOUS BLD VENIPUNCTURE: CPT

## 2018-09-21 PROCEDURE — 83605 ASSAY OF LACTIC ACID: CPT

## 2018-09-21 PROCEDURE — 80053 COMPREHEN METABOLIC PANEL: CPT

## 2018-09-21 PROCEDURE — 2580000003 HC RX 258: Performed by: INTERNAL MEDICINE

## 2018-09-21 RX ORDER — HYDROXYZINE HYDROCHLORIDE 25 MG/1
25 TABLET, FILM COATED ORAL 4 TIMES DAILY PRN
Status: DISCONTINUED | OUTPATIENT
Start: 2018-09-21 | End: 2018-09-21

## 2018-09-21 RX ORDER — POLYETHYLENE GLYCOL 3350 17 G/17G
17 POWDER, FOR SOLUTION ORAL DAILY
Status: DISCONTINUED | OUTPATIENT
Start: 2018-09-21 | End: 2018-09-25 | Stop reason: HOSPADM

## 2018-09-21 RX ORDER — DIPHENHYDRAMINE HCL 25 MG
50 CAPSULE ORAL EVERY 4 HOURS PRN
Status: DISCONTINUED | OUTPATIENT
Start: 2018-09-21 | End: 2018-09-22

## 2018-09-21 RX ADMIN — MEROPENEM 1 G: 1 INJECTION, POWDER, FOR SOLUTION INTRAVENOUS at 05:55

## 2018-09-21 RX ADMIN — OXYCODONE HYDROCHLORIDE AND ACETAMINOPHEN 2 TABLET: 5; 325 TABLET ORAL at 04:03

## 2018-09-21 RX ADMIN — LORAZEPAM 0.5 MG: 0.5 TABLET ORAL at 22:03

## 2018-09-21 RX ADMIN — ENOXAPARIN SODIUM 40 MG: 40 INJECTION SUBCUTANEOUS at 08:17

## 2018-09-21 RX ADMIN — POLYETHYLENE GLYCOL 3350 17 G: 17 POWDER, FOR SOLUTION ORAL at 14:29

## 2018-09-21 RX ADMIN — OXYCODONE HYDROCHLORIDE AND ACETAMINOPHEN 2 TABLET: 5; 325 TABLET ORAL at 12:31

## 2018-09-21 RX ADMIN — INSULIN LISPRO 9 UNITS: 100 INJECTION, SOLUTION INTRAVENOUS; SUBCUTANEOUS at 03:59

## 2018-09-21 RX ADMIN — DILTIAZEM HYDROCHLORIDE 360 MG: 180 CAPSULE, COATED, EXTENDED RELEASE ORAL at 08:17

## 2018-09-21 RX ADMIN — SODIUM CHLORIDE: 9 INJECTION, SOLUTION INTRAVENOUS at 09:24

## 2018-09-21 RX ADMIN — METRONIDAZOLE 500 MG: 500 INJECTION, SOLUTION INTRAVENOUS at 03:02

## 2018-09-21 RX ADMIN — ATORVASTATIN CALCIUM 10 MG: 10 TABLET, FILM COATED ORAL at 08:17

## 2018-09-21 RX ADMIN — HYDROXYZINE HYDROCHLORIDE 25 MG: 25 TABLET ORAL at 09:24

## 2018-09-21 RX ADMIN — OXYCODONE HYDROCHLORIDE AND ACETAMINOPHEN 2 TABLET: 5; 325 TABLET ORAL at 08:17

## 2018-09-21 RX ADMIN — INSULIN LISPRO 9 UNITS: 100 INJECTION, SOLUTION INTRAVENOUS; SUBCUTANEOUS at 20:08

## 2018-09-21 RX ADMIN — INSULIN LISPRO 9 UNITS: 100 INJECTION, SOLUTION INTRAVENOUS; SUBCUTANEOUS at 12:10

## 2018-09-21 RX ADMIN — ENALAPRIL MALEATE 2.5 MG: 2.5 TABLET ORAL at 08:17

## 2018-09-21 RX ADMIN — OXYCODONE HYDROCHLORIDE AND ACETAMINOPHEN 2 TABLET: 5; 325 TABLET ORAL at 20:16

## 2018-09-21 RX ADMIN — HYDROXYZINE HYDROCHLORIDE 25 MG: 25 TABLET ORAL at 12:33

## 2018-09-21 RX ADMIN — DIPHENHYDRAMINE HYDROCHLORIDE 50 MG: 25 CAPSULE ORAL at 20:16

## 2018-09-21 RX ADMIN — DIPHENHYDRAMINE HYDROCHLORIDE 50 MG: 25 CAPSULE ORAL at 16:45

## 2018-09-21 RX ADMIN — PANTOPRAZOLE SODIUM 40 MG: 40 TABLET, DELAYED RELEASE ORAL at 08:16

## 2018-09-21 RX ADMIN — ONDANSETRON 4 MG: 2 INJECTION INTRAMUSCULAR; INTRAVENOUS at 00:07

## 2018-09-21 RX ADMIN — MEROPENEM 1 G: 1 INJECTION, POWDER, FOR SOLUTION INTRAVENOUS at 22:04

## 2018-09-21 RX ADMIN — CEFEPIME HYDROCHLORIDE 2 G: 2 INJECTION, POWDER, FOR SOLUTION INTRAVENOUS at 03:02

## 2018-09-21 RX ADMIN — SODIUM CHLORIDE: 9 INJECTION, SOLUTION INTRAVENOUS at 00:23

## 2018-09-21 RX ADMIN — INSULIN LISPRO 9 UNITS: 100 INJECTION, SOLUTION INTRAVENOUS; SUBCUTANEOUS at 16:42

## 2018-09-21 RX ADMIN — INSULIN LISPRO 6 UNITS: 100 INJECTION, SOLUTION INTRAVENOUS; SUBCUTANEOUS at 08:05

## 2018-09-21 RX ADMIN — MEROPENEM 1 G: 1 INJECTION, POWDER, FOR SOLUTION INTRAVENOUS at 14:29

## 2018-09-21 ASSESSMENT — PAIN DESCRIPTION - PAIN TYPE
TYPE: ACUTE PAIN;SURGICAL PAIN
TYPE: ACUTE PAIN;SURGICAL PAIN
TYPE: SURGICAL PAIN
TYPE: ACUTE PAIN;SURGICAL PAIN
TYPE: ACUTE PAIN;SURGICAL PAIN

## 2018-09-21 ASSESSMENT — PAIN DESCRIPTION - LOCATION
LOCATION: ABDOMEN
LOCATION: ABDOMEN;BACK
LOCATION: ABDOMEN

## 2018-09-21 ASSESSMENT — PAIN DESCRIPTION - DESCRIPTORS
DESCRIPTORS: ACHING
DESCRIPTORS: ACHING
DESCRIPTORS: ACHING;CONSTANT
DESCRIPTORS: ACHING

## 2018-09-21 ASSESSMENT — PAIN DESCRIPTION - PROGRESSION
CLINICAL_PROGRESSION: NOT CHANGED
CLINICAL_PROGRESSION: GRADUALLY IMPROVING
CLINICAL_PROGRESSION: GRADUALLY IMPROVING

## 2018-09-21 ASSESSMENT — PAIN DESCRIPTION - FREQUENCY
FREQUENCY: CONTINUOUS

## 2018-09-21 ASSESSMENT — PAIN SCALES - GENERAL
PAINLEVEL_OUTOF10: 6
PAINLEVEL_OUTOF10: 6
PAINLEVEL_OUTOF10: 5
PAINLEVEL_OUTOF10: 6
PAINLEVEL_OUTOF10: 7
PAINLEVEL_OUTOF10: 6

## 2018-09-21 ASSESSMENT — PAIN DESCRIPTION - ORIENTATION: ORIENTATION: RIGHT;LEFT;ANTERIOR;POSTERIOR;MID

## 2018-09-21 ASSESSMENT — PAIN DESCRIPTION - ONSET
ONSET: ON-GOING

## 2018-09-21 NOTE — PROGRESS NOTES
(!) 134/57 - - - - -   09/21/18 0122 (!) 134/57 - - - - -   09/21/18 0100 (!) 134/57 - - - - -   09/21/18 0003 132/67 - - - - -   09/20/18 2359 132/67 98.5 °F (36.9 °C) Temporal 80 16 -   09/20/18 2300 133/68 - - - - -   09/20/18 2200 (!) 123/56 - - - - -   09/20/18 2000 (!) 118/49 - - - - -   09/20/18 1930 (!) 126/59 - - - - -   09/20/18 1903 (!) 141/74 98.7 °F (37.1 °C) - 80 16 98 %   09/20/18 1830 (!) 151/54 - - - - -   09/20/18 1800 136/62 - - 82 16 -   09/20/18 1730 (!) 134/58 - - 85 16 -   09/20/18 1700 138/80 - - 76 16 -   09/20/18 1630 (!) 143/65 - - 65 14 -   09/20/18 1628 - 98.6 °F (37 °C) Temporal - - -   09/20/18 1600 (!) 147/65 - - 67 14 -   09/20/18 1545 (!) 149/72 - - 66 16 93 %   09/20/18 1530 (!) 151/74 99.1 °F (37.3 °C) Temporal 68 16 93 %   09/20/18 1521 - - - 71 - -   09/20/18 1500 (!) 147/84 - - 74 20 96 %   09/20/18 1445 (!) 150/65 - - 70 20 95 %   09/20/18 1430 (!) 168/71 - - 86 20 96 %   09/20/18 1425 (!) 168/69 - - 86 20 96 %   09/20/18 1420 (!) 172/72 - - 90 18 98 %   09/20/18 1415 (!) 188/77 98.6 °F (37 °C) Temporal 98 16 98 %   09/20/18 1200 (!) 152/66 99.7 °F (37.6 °C) Temporal 91 20 100 %       Intake/Output Summary (Last 24 hours) at 09/21/18 1151  Last data filed at 09/21/18 0853   Gross per 24 hour   Intake             4539 ml   Output             1750 ml   Net             2789 ml       Recent Labs      09/19/18   0511  09/20/18   0549  09/21/18   0524   WBC  11.3  17.6*  15.3*   HGB  11.7*  11.4*  10.5*   HCT  36.3  36.1*  31.8*   MCV  98.4  100.8  97.2   PLT  223  226  256     Recent Labs      09/19/18   0511  09/20/18   0549  09/21/18   0524   NA  132*  133*  136   K  3.9  4.9  4.9   CL  99  101  106   CO2  19*  14*  21   BUN  15  22*  19   CREATININE  0.64  0.87  0.81       Recent Labs      09/19/18   0250   COLORU  YELLOW   PHUR  6.0   WBCUA  50    RBCUA  2 TO 5   MUCUS  NOT REPORTED   TRICHOMONAS  NOT REPORTED   YEAST  NOT REPORTED   BACTERIA  1+*   SPECGRAV  1.020 LEUKOCYTESUR  MODERATE*   UROBILINOGEN  Normal   BILIRUBINUR  NEGATIVE       Additional Lab/culture results: none    Interval Imaging Findings: none    Assessment:    Patient Active Problem List   Diagnosis    Colon cancer screening    Family history of colon cancer    Colon polyp    Diabetic ketoacidosis without coma associated with type 1 diabetes mellitus ([address]    Mild malnutrition ([address]    Frequent PVCs    DM (diabetes mellitus), type 1 ([address]    Sepsis [facility]    UTI due to extended-spectrum beta lactamase (ESBL) producing Escherichia coli    Other constipation       Plan: Continue culture specific meropenem. I did educate her on lifestyle modifications to prevent urinary tract infections in the future. She was urged to drink 80 ounces of water per day. Avoid coffee, tea, soda, and alcohol. She does admit to a history of constipation. I will start her on MiraLAX daily. We will see her in the office 2-3 weeks after hospital discharge or sooner if needed.     I have discussed the care of this patient including pertinent history and exam findings, lab and imaging results, assessment, orders and plan as documented above with Lakesha Villa MD.    ------------------------------------------------  Domingo Rivera  11:51 AM 9/21/2018

## 2018-09-21 NOTE — PLAN OF CARE
Problem: Pain:  Goal: Pain level will decrease  Pain level will decrease   Outcome: Ongoing  Aroused easily. Pain improved with Percocet. C/O nausea. Zofran 4 mg IVP given slowly. Abdomen soft and rounded with bowel sounds noted throughout. 5 abdominal stab wounds with glue all dry and intact. Problem: Serum Glucose Level - Abnormal:  Goal: Ability to maintain appropriate glucose levels will improve  Ability to maintain appropriate glucose levels will improve   Outcome: Ongoing  Blood sugar 273. Humalog 9 units given.

## 2018-09-21 NOTE — PROGRESS NOTES
Patient going to need IV medication on discharge. [facility]. Referral made and paper work fax.   MARIANNE Espinoza

## 2018-09-21 NOTE — PLAN OF CARE
Problem: Infection - Surgical Site:  Goal: Will show no infection signs and symptoms  Will show no infection signs and symptoms  Outcome: Ongoing  Surgical site remains without s/s of infection. Patient is afebrile. VS stable and WNL.

## 2018-09-21 NOTE — PROGRESS NOTES
[address] [facility] [address] 08/30/17 1202-Present   [facility] GonzalesalmazSelect [facility] - [place] [address] [facility] [address] 08/30/17 1201-Present   Culture & Susceptibility     ESCHERICHIA COLI     Antibiotic Interpretation JEAN Status   Confirmatory Extended Spectrum Beta-Lactamase Resistant POSITIVE Final   amikacin Sensitive 4 SUSCEPTIBLE Final   ampicillin Resistant >=32 RESISTANT Final   ampicillin-sulbactam  NOT REPORTED Final   aztreonam Resistant >=64 RESISTANT Final   ceFAZolin Resistant >=64 RESISTANT Final   cefTRIAXone Resistant >=64 RESISTANT Final   cefepime  NOT REPORTED Final   ciprofloxacin Resistant >=4 RESISTANT Final   ertapenem  NOT REPORTED Final   gentamicin Resistant >=16 RESISTANT Final   meropenem Sensitive <=0.25 SUSCEPTIBLE Final   nitrofurantoin  NOT REPORTED Final   piperacillin-tazobactam Resistant 8 RESISTANT Final   tigecycline  NOT REPORTED Final   tobramycin Intermediate 8 INTERMEDIATE Final   trimethoprim-sulfamethoxazole Resistant >=320 RESISTANT Final         Lab and Collection     CULTURE BLOOD #1 on 9/19/2018   Result History     CULTURE BLOOD #1 on 9/20/2018 9/20/2018 11:50 PM - Minal Escalera Incoming Lab Results From [facility]     Component Results     Component Collected Lab   Specimen Description 09/19/2018  2:50 AM [facility]   . URINE    Special Requests 09/19/2018  2:50 AM [facility]   UNSPECIFIED    Culture  (Abnormal) 09/19/2018  2:50 AM Brittany 40 >[id number] CFU/ML THIS ORGANISM IS AN EXTENDED-SPECTRUM     Culture  (Abnormal) 09/19/2018  2:50 AM [address] [place] [address] AND RESISTANCE TO THERAPY WITH PENICILLINS,     Culture  (Abnormal) 09/19/2018  2:50 AM Jerry Rowland 92 AZTREONAM IS EXPECTED. [address] Jr Yajaira Black      Culture  (Abnormal) 09/19/2018  2:50  [address] Systolic (90LRN), BMK:390 , Min:100 , KMK:603      Diastolic (41MZX), NYF:17, Min:47, Max:87    Pulse: 71  Pulse Range:    Pulse  Av.7  Min: 65  Max: 98    Resp: 16  Resp Range:   Resp  Av.3  Min: 4  Max: 22    SpO2: 98 % on room air  SpO2 range:   SpO2  Av.4 %  Min: 93 %  Max: 99 %    24HR INTAKE/OUTPUT:      Intake/Output Summary (Last 24 hours) at 18 1259  Last data filed at 18 1237   Gross per 24 hour   Intake             4659 ml   Output             1750 ml   Net             2909 ml       -----------------------------------------------------------------  Review of Systems:  Constitutional:negative  for fevers, and negative for chills. Eyes: negative for visual disturbance   ENT: negative for sore throat, negative nasal congestion, and negative for earache  Respiratory: negative for shortness of breath, negative for cough, and negative for wheezing  Cardiovascular: negative for chest pain, negative for palpitations, and negative for syncope  Gastrointestinal: positive for abdominal pain, negative for nausea,negative for vomiting, negative for diarrhea, negative for constipation, and negative for hematochezia or melena  Genitourinary: negative for dysuria, negative for urinary urgency, negative for urinary frequency, and negative for hematuria  Skin: negative for skin rash, and positive for skin lesions  Neurological: negative for unilateral weakness, numbness or tingling.     Exam:  GEN:  alert and oriented to person, place and time, in no acute distress  EYES: PERRL  NECK: normal, supple  PULM: clear to auscultation bilaterally- no wheezes, rales or rhonchi, normal air movement, no respiratory distress  COR: regular rate & rhythm and no murmurs  ABD:  soft, mild tender around port sited, non-distended, hypoactive bowel sounds, no masses or organomegaly  EXT:   edema: trace affecting bilateral foot, bilateral ankle  NEURO: follows commands, DAY, no deficits  SKIN:  See cultures were obtained   Dr. Basim Barroso discussed case with urology who does feel that her sepsis is urinary in  source and that the patient likely passed a stone at some point in the recent past   Continue meropenem for 10 more days   Continue IVF    DM (diabetes mellitus), type 1     lantus 15 units BID   High dose SSI   Goal glucose 200-250 per Dr. Basim Barroso   If patient starts to eat better with change finger sticks to q6h    Chronic cholecystitis: S/P cholecystectomy   Appreciate general surgery    Itching   Prn atarax and benadryl  Resolved Problems:    * No resolved hospital problems. *    · High risk medication: none    CHRISTELLE SANCHEZ PA-C  9/21/2018, 12:59 PM    I spent 35 minutes providing critical care management to this patient.   This excludes time spent in performing separately billed procedures

## 2018-09-22 LAB
ABSOLUTE EOS #: 0 K/UL (ref 0–0.44)
ABSOLUTE IMMATURE GRANULOCYTE: 0 K/UL (ref 0–0.3)
ABSOLUTE LYMPH #: 0.76 K/UL (ref 1.1–3.7)
ABSOLUTE MONO #: 0.76 K/UL (ref 0.1–1.2)
ALBUMIN SERPL-MCNC: 3.1 G/DL (ref 3.5–5.2)
ALBUMIN/GLOBULIN RATIO: 1.1 (ref 1–2.5)
ALP BLD-CCNC: 168 U/L (ref 35–104)
ALT SERPL-CCNC: 95 U/L (ref 5–33)
ANION GAP SERPL CALCULATED.3IONS-SCNC: 9 MMOL/L (ref 9–17)
AST SERPL-CCNC: 44 U/L
BASOPHILS # BLD: 0 % (ref 0–2)
BASOPHILS ABSOLUTE: 0 K/UL (ref 0–0.2)
BILIRUB SERPL-MCNC: 0.19 MG/DL (ref 0.3–1.2)
BUN BLDV-MCNC: 23 MG/DL (ref 6–20)
BUN/CREAT BLD: 27 (ref 9–20)
CALCIUM SERPL-MCNC: 8.7 MG/DL (ref 8.6–10.4)
CHLORIDE BLD-SCNC: 104 MMOL/L (ref 98–107)
CO2: 19 MMOL/L (ref 20–31)
CREAT SERPL-MCNC: 0.85 MG/DL (ref 0.5–0.9)
DIFFERENTIAL TYPE: ABNORMAL
EOSINOPHILS RELATIVE PERCENT: 0 % (ref 1–4)
GFR AFRICAN AMERICAN: >60 ML/MIN
GFR NON-AFRICAN AMERICAN: >60 ML/MIN
GFR SERPL CREATININE-BSD FRML MDRD: ABNORMAL ML/MIN/{1.73_M2}
GFR SERPL CREATININE-BSD FRML MDRD: ABNORMAL ML/MIN/{1.73_M2}
GLUCOSE BLD-MCNC: 143 MG/DL (ref 74–100)
GLUCOSE BLD-MCNC: 162 MG/DL (ref 74–100)
GLUCOSE BLD-MCNC: 183 MG/DL (ref 74–100)
GLUCOSE BLD-MCNC: 197 MG/DL (ref 74–100)
GLUCOSE BLD-MCNC: 201 MG/DL (ref 70–99)
GLUCOSE BLD-MCNC: 202 MG/DL (ref 74–100)
GLUCOSE BLD-MCNC: 229 MG/DL (ref 74–100)
GLUCOSE BLD-MCNC: 290 MG/DL (ref 74–100)
HCT VFR BLD CALC: 32.1 % (ref 36.3–47.1)
HEMOGLOBIN: 10.7 G/DL (ref 11.9–15.1)
IMMATURE GRANULOCYTES: 0 %
LACTIC ACID, WHOLE BLOOD: NORMAL MMOL/L (ref 0.7–2.1)
LACTIC ACID: 0.9 MMOL/L (ref 0.5–2.2)
LYMPHOCYTES # BLD: 4 % (ref 24–43)
MCH RBC QN AUTO: 32.2 PG (ref 25.2–33.5)
MCHC RBC AUTO-ENTMCNC: 33.3 G/DL (ref 28.4–34.8)
MCV RBC AUTO: 96.7 FL (ref 82.6–102.9)
MONOCYTES # BLD: 4 % (ref 3–12)
MORPHOLOGY: NORMAL
NRBC AUTOMATED: 0 PER 100 WBC
PDW BLD-RTO: 13.5 % (ref 11.8–14.4)
PLATELET # BLD: 266 K/UL (ref 138–453)
PLATELET ESTIMATE: ABNORMAL
PMV BLD AUTO: 9.4 FL (ref 8.1–13.5)
POTASSIUM SERPL-SCNC: 4.2 MMOL/L (ref 3.7–5.3)
RBC # BLD: 3.32 M/UL (ref 3.95–5.11)
RBC # BLD: ABNORMAL 10*6/UL
SEG NEUTROPHILS: 92 % (ref 36–65)
SEGMENTED NEUTROPHILS ABSOLUTE COUNT: 17.58 K/UL (ref 1.5–8.1)
SODIUM BLD-SCNC: 132 MMOL/L (ref 135–144)
TOTAL PROTEIN: 5.8 G/DL (ref 6.4–8.3)
WBC # BLD: 19.1 K/UL (ref 3.5–11.3)
WBC # BLD: ABNORMAL 10*3/UL

## 2018-09-22 PROCEDURE — 6370000000 HC RX 637 (ALT 250 FOR IP): Performed by: INTERNAL MEDICINE

## 2018-09-22 PROCEDURE — 6360000002 HC RX W HCPCS: Performed by: INTERNAL MEDICINE

## 2018-09-22 PROCEDURE — 82947 ASSAY GLUCOSE BLOOD QUANT: CPT

## 2018-09-22 PROCEDURE — 6370000000 HC RX 637 (ALT 250 FOR IP): Performed by: NURSE PRACTITIONER

## 2018-09-22 PROCEDURE — 85025 COMPLETE CBC W/AUTO DIFF WBC: CPT

## 2018-09-22 PROCEDURE — 2580000003 HC RX 258: Performed by: INTERNAL MEDICINE

## 2018-09-22 PROCEDURE — 93005 ELECTROCARDIOGRAM TRACING: CPT

## 2018-09-22 PROCEDURE — 2000000000 HC ICU R&B

## 2018-09-22 PROCEDURE — 80053 COMPREHEN METABOLIC PANEL: CPT

## 2018-09-22 PROCEDURE — 6360000002 HC RX W HCPCS: Performed by: PHYSICIAN ASSISTANT

## 2018-09-22 PROCEDURE — 36415 COLL VENOUS BLD VENIPUNCTURE: CPT

## 2018-09-22 PROCEDURE — 83605 ASSAY OF LACTIC ACID: CPT

## 2018-09-22 RX ORDER — FUROSEMIDE 10 MG/ML
40 INJECTION INTRAMUSCULAR; INTRAVENOUS ONCE
Status: COMPLETED | OUTPATIENT
Start: 2018-09-22 | End: 2018-09-22

## 2018-09-22 RX ORDER — DIPHENHYDRAMINE HCL 25 MG
25 CAPSULE ORAL EVERY 6 HOURS PRN
Status: DISCONTINUED | OUTPATIENT
Start: 2018-09-22 | End: 2018-09-25 | Stop reason: HOSPADM

## 2018-09-22 RX ADMIN — OXYCODONE HYDROCHLORIDE AND ACETAMINOPHEN 2 TABLET: 5; 325 TABLET ORAL at 00:07

## 2018-09-22 RX ADMIN — MEROPENEM 1 G: 1 INJECTION, POWDER, FOR SOLUTION INTRAVENOUS at 21:38

## 2018-09-22 RX ADMIN — OXYCODONE HYDROCHLORIDE AND ACETAMINOPHEN 2 TABLET: 5; 325 TABLET ORAL at 21:01

## 2018-09-22 RX ADMIN — INSULIN LISPRO 3 UNITS: 100 INJECTION, SOLUTION INTRAVENOUS; SUBCUTANEOUS at 12:28

## 2018-09-22 RX ADMIN — FUROSEMIDE 40 MG: 10 INJECTION, SOLUTION INTRAMUSCULAR; INTRAVENOUS at 12:48

## 2018-09-22 RX ADMIN — INSULIN LISPRO 3 UNITS: 100 INJECTION, SOLUTION INTRAVENOUS; SUBCUTANEOUS at 08:28

## 2018-09-22 RX ADMIN — DIPHENHYDRAMINE HYDROCHLORIDE 50 MG: 25 CAPSULE ORAL at 04:05

## 2018-09-22 RX ADMIN — MEROPENEM 1 G: 1 INJECTION, POWDER, FOR SOLUTION INTRAVENOUS at 05:16

## 2018-09-22 RX ADMIN — DIPHENHYDRAMINE HYDROCHLORIDE 50 MG: 25 CAPSULE ORAL at 00:07

## 2018-09-22 RX ADMIN — POLYETHYLENE GLYCOL 3350 17 G: 17 POWDER, FOR SOLUTION ORAL at 08:31

## 2018-09-22 RX ADMIN — MEROPENEM 1 G: 1 INJECTION, POWDER, FOR SOLUTION INTRAVENOUS at 14:05

## 2018-09-22 RX ADMIN — ENALAPRIL MALEATE 2.5 MG: 2.5 TABLET ORAL at 08:31

## 2018-09-22 RX ADMIN — OXYCODONE HYDROCHLORIDE AND ACETAMINOPHEN 2 TABLET: 5; 325 TABLET ORAL at 16:53

## 2018-09-22 RX ADMIN — ENOXAPARIN SODIUM 40 MG: 40 INJECTION SUBCUTANEOUS at 08:31

## 2018-09-22 RX ADMIN — INSULIN LISPRO 3 UNITS: 100 INJECTION, SOLUTION INTRAVENOUS; SUBCUTANEOUS at 23:48

## 2018-09-22 RX ADMIN — OXYCODONE HYDROCHLORIDE AND ACETAMINOPHEN 1 TABLET: 5; 325 TABLET ORAL at 08:31

## 2018-09-22 RX ADMIN — INSULIN LISPRO 9 UNITS: 100 INJECTION, SOLUTION INTRAVENOUS; SUBCUTANEOUS at 00:04

## 2018-09-22 RX ADMIN — DIPHENHYDRAMINE HYDROCHLORIDE 50 MG: 25 CAPSULE ORAL at 12:54

## 2018-09-22 RX ADMIN — DIPHENHYDRAMINE HYDROCHLORIDE 50 MG: 25 CAPSULE ORAL at 08:31

## 2018-09-22 RX ADMIN — INSULIN LISPRO 3 UNITS: 100 INJECTION, SOLUTION INTRAVENOUS; SUBCUTANEOUS at 21:00

## 2018-09-22 RX ADMIN — OXYCODONE HYDROCHLORIDE AND ACETAMINOPHEN 2 TABLET: 5; 325 TABLET ORAL at 04:05

## 2018-09-22 RX ADMIN — PANTOPRAZOLE SODIUM 40 MG: 40 TABLET, DELAYED RELEASE ORAL at 08:31

## 2018-09-22 RX ADMIN — Medication 10 ML: at 12:48

## 2018-09-22 RX ADMIN — Medication 10 ML: at 21:50

## 2018-09-22 RX ADMIN — ATORVASTATIN CALCIUM 10 MG: 10 TABLET, FILM COATED ORAL at 08:31

## 2018-09-22 RX ADMIN — DILTIAZEM HYDROCHLORIDE 360 MG: 180 CAPSULE, COATED, EXTENDED RELEASE ORAL at 08:31

## 2018-09-22 RX ADMIN — SODIUM CHLORIDE: 9 INJECTION, SOLUTION INTRAVENOUS at 04:07

## 2018-09-22 RX ADMIN — INSULIN LISPRO 6 UNITS: 100 INJECTION, SOLUTION INTRAVENOUS; SUBCUTANEOUS at 17:41

## 2018-09-22 RX ADMIN — INSULIN LISPRO 6 UNITS: 100 INJECTION, SOLUTION INTRAVENOUS; SUBCUTANEOUS at 04:03

## 2018-09-22 ASSESSMENT — PAIN SCALES - GENERAL
PAINLEVEL_OUTOF10: 7
PAINLEVEL_OUTOF10: 6
PAINLEVEL_OUTOF10: 5
PAINLEVEL_OUTOF10: 8
PAINLEVEL_OUTOF10: 7
PAINLEVEL_OUTOF10: 8
PAINLEVEL_OUTOF10: 5
PAINLEVEL_OUTOF10: 7

## 2018-09-22 ASSESSMENT — PAIN DESCRIPTION - DESCRIPTORS
DESCRIPTORS: ACHING
DESCRIPTORS: ACHING
DESCRIPTORS: ACHING;TENDER
DESCRIPTORS: TENDER
DESCRIPTORS: ACHING
DESCRIPTORS: ACHING

## 2018-09-22 ASSESSMENT — PAIN DESCRIPTION - LOCATION
LOCATION: ABDOMEN
LOCATION: ABDOMEN;BACK
LOCATION: ABDOMEN
LOCATION: ABDOMEN;BACK

## 2018-09-22 ASSESSMENT — PAIN DESCRIPTION - ONSET
ONSET: ON-GOING
ONSET: ON-GOING

## 2018-09-22 ASSESSMENT — PAIN DESCRIPTION - PAIN TYPE
TYPE: SURGICAL PAIN
TYPE: SURGICAL PAIN;ACUTE PAIN
TYPE: SURGICAL PAIN

## 2018-09-22 ASSESSMENT — PAIN DESCRIPTION - PROGRESSION
CLINICAL_PROGRESSION: GRADUALLY WORSENING
CLINICAL_PROGRESSION: NOT CHANGED

## 2018-09-22 ASSESSMENT — PAIN DESCRIPTION - FREQUENCY
FREQUENCY: CONTINUOUS
FREQUENCY: CONTINUOUS

## 2018-09-22 NOTE — PROGRESS NOTES
09/19/2018       No results found for: MYOGLOBIN, TROPONINT, CKTOTAL, CKMB, PROBNP    Ct Abdomen Pelvis Wo Contrast Additional Contrast? None    Result Date: 9/21/2018  EXAMINATION: CT OF THE ABDOMEN AND PELVIS WITHOUT CONTRAST 9/19/2018 3:42 am TECHNIQUE: CT of the abdomen and pelvis was performed without the administration of intravenous contrast. Multiplanar reformatted images are provided for review. Dose modulation, iterative reconstruction, and/or weight based adjustment of the mA/kV was utilized to reduce the radiation dose to as low as reasonably achievable. COMPARISON: None. HISTORY: ORDERING SYSTEM PROVIDED HISTORY: PYELONEPHRITIS, COMPLICATED (DM, IMMUNODIFF, HX OF STONES, PRIOR SURGERY, NOT RESPONDING TO ABX) TECHNOLOGIST PROVIDED HISTORY: FINDINGS: Lower Chest:  Visualized portion of the lower chest demonstrates no acute abnormality. Organs: No hydronephrosis or nephrolithiasis. Mild right kidney pelvocaliectasis with urothelial hyperdensity. No obstructive uropathy identified. Trace right greater left perinephric stranding. The adrenal glands, liver, gallbladder, spleen and pancreas are within normal limits for noncontrast examination. GI/Bowel: Large bowel with moderate to large volume of fecal material.  The appendix is not visualized but no pericecal inflammatory changes are noted. Pelvis: The urinary bladder is unremarkable. The uterus is in situ. No significant free fluid. Peritoneum/Retroperitoneum: The abdominal aorta is without aneurysmal dilatation. No lymphadenopathy, free fluid or free air. Bones/Soft Tissues: There are degenerative changes of the lumbar spine but no suspicious osseous lesion. Noncontrast examination is without convincing evidence of acute abdominopelvic abnormality. However, right kidney mild pelvicaliectasis with questionable urothelial hyperdensity may be physiologic or sequela of urinary tract infection, in the appropriate clinical setting.      Xr Chest Standard (2 Vw)    Result Date: 9/19/2018  EXAMINATION: TWO VIEWS OF THE CHEST 9/19/2018 3:40 am COMPARISON: October 23, 2017 HISTORY: ORDERING SYSTEM PROVIDED HISTORY: sepsis TECHNOLOGIST PROVIDED HISTORY: sepsis FINDINGS: The mediastinal and cardiac contours are normal. No lobar lung consolidation, pleural effusion or pneumothorax. The bones are stable. .     No radiographic evidence of acute cardiopulmonary disease. Ct Abdomen W Contrast Additional Contrast? None    Result Date: 9/20/2018  EXAMINATION: CT ABDOMEN AND PELVIS WITH CONTRAST 9/20/2018 8:38 am TECHNIQUE: CT of the abdomen was performed with the administration of intravenous contrast. Multiplanar reformatted images are provided for review. Dose modulation, iterative reconstruction, and/or weight based adjustment of the mA/kV was utilized to reduce the radiation dose to as low as reasonably achievable. COMPARISON: 09/19/2018 HISTORY: ORDERING SYSTEM PROVIDED HISTORY: RUQ PAIN, FEVER, ELEV WBC, MURPHYS SIGN TECHNOLOGIST PROVIDED HISTORY: FINDINGS: Lower Chest: Interlobular septal thickening and trace bilateral pleural effusions with subtle ground-glass opacities within the lung bases bilaterally. Organs: The liver, pancreas, and spleen is grossly unremarkable. There is mild prominence of the gallbladder wall and trace pericholecystic fluid. No obvious cholelithiasis or intrahepatic biliary duct dilatation. GI/Bowel: Bowel is without evidence for obstruction or inflammation. No free intraperitoneal air. There is nonspecific mesenteric edema within the right hemiabdomen. Additionally, there is thickening of the gastric antrum and pylorus. .  Small bowel loops are normal in caliber. No definite hiatal hernia. Pelvis: Trace pelvic free fluid. Peritoneum/Retroperitoneum: The adrenal glands are normal in size and configuration bilaterally. Kidneys perfuse and excrete in a symmetric fashion and ureters are not obstructed.   Urinary bladder is unremarkable. Bones/Soft Tissues: Osseous structures are intact without evidence for acute fracture or dislocation. No definite lytic or blastic lesions. Overlying soft tissues are unremarkable. Vasculature: The abdominal aorta is normal in caliber. No discrete and aneurysm or dissection. No lymphadenopathy within the abdomen or pelvis. Borderline gallbladder wall thickening and trace pericholecystic fluid. Correlation with nuclear medicine HIDA scan suggested possible cystic duct obstruction. No definite cholelithiasis. No biliary ductal dilatation. Nonspecific mesenteric edema within the right abdomen. Circumferential wall thickening of the gastric antrum pylorus, possibly representing gastritis. Interlobular septal thickening and trace pleural effusions in the lung bases. Us Gallbladder Ruq    Result Date: 9/20/2018  EXAMINATION: RIGHT UPPER QUADRANT ULTRASOUND 9/20/2018 8:54 am COMPARISON: CT 09/20/2018 HISTORY: ORDERING SYSTEM PROVIDED HISTORY: RUQ PAIN, FEVER, ELEV WBC, MURPHYS SIGN FINDINGS: LIVER:  Image portions of the liver show no focal abnormality. No intrahepatic biliary dilatation is seen. BILIARY SYSTEM:  The gallbladder shows no evidence of cholelithiasis. There is gallbladder wall thickening measuring up to 7.3 mm. There may be a trace amount of pericholecystic fluid. Technologist indicates a positive sonographic Stovall's sign. Findings could represent acalculous cholecystitis. Common bile duct is within normal limits measuring 4.2 mm. RIGHT KIDNEY: The right kidney is grossly unremarkable without evidence of hydronephrosis. PANCREAS:  Visualized portions of the pancreas are unremarkable. OTHER: No evidence of right upper quadrant ascites. Gallbladder wall thickening and perhaps minimal pericholecystic fluid with a positive sonographic Stovall's sign suggesting possible acalculous cholecystitis. No sonographic evidence for cholelithiasis.   HIDA scan could assess for acute  S/P cholecystectomy 09/21/2018    Sepsis ([facility] [address] 09/19/2018    DM (diabetes mellitus), type 1 ([facility] 05/08/2018    Frequent PVCs 10/24/2017    Colon polyp     Colon cancer screening 01/03/2017    Family history of colon cancer 01/03/2017       PLAN:  · IV julia  · Fluids 75  · miralax . Dim[place] Conrado ... ambulate  · Critical Care Time: 10  · Insulin adjusted . ..... Improved.   · Overall appearing better and improving      Harry Real M.D.

## 2018-09-22 NOTE — PROGRESS NOTES
Occupational Therapy   Occupational Therapy Initial Assessment  Date: 2018   Patient Name: Maral Garcia  MRN: [id number]     : 1960    Date of Service: 2018    Discharge Recommendations:  Home with assist PRN         Patient Diagnosis(es): The primary encounter diagnosis was Sepsis, due to unspecified organism [facility]). A diagnosis of UTI due to extended-spectrum beta lactamase (ESBL) producing Escherichia coli was also pertinent to this visit. has a past medical history of Diabetes mellitus ([facility] [address] H/O cardiovascular stress test; History of Holter monitoring; History of Holter monitoring; and PONV (postoperative nausea and vomiting). has a past surgical history that includes Tubal ligation (); Rotator cuff repair (Left, ); eye surgery (Bilateral, ); Colonoscopy (2017); and Cholecystectomy (2018).            Restrictions  Restrictions/Precautions  Restrictions/Precautions: General Precautions, Fall Risk    Subjective   General  Chart Reviewed: Yes  Response to previous treatment: Patient with no complaints from previous session  Family / Caregiver Present: No  Pain Assessment  Patient Currently in Pain: Yes  Pain Assessment: 0-10  Pain Level: 5  Pain Type: Surgical pain  Pain Location: Abdomen  Pain Orientation: Right, Left, Anterior, Posterior, Mid  Pain Radiating Towards: right pelvic, groin area  Pain Descriptors: Aching, Tender  Pain Frequency: Continuous  Clinical Progression: Not changed  Patient's Stated Pain Goal: No pain  Pain Intervention(s): Medication (see eMar)  Response to Pain Intervention: Patient Satisfied  Pre Treatment Pain Screening  Intervention List: Patient able to continue with treatment  Oxygen Therapy  SpO2: (!) 87 %  Social/Functional History  Social/Functional History  Lives With: Significant other  Type of Home: House  Bathroom Toilet: Standard  Bathroom Accessibility: Accessible  Receives Help From: Family  ADL Assistance: Independent  Homemaking

## 2018-09-23 LAB
ABSOLUTE EOS #: 0.04 K/UL (ref 0–0.44)
ABSOLUTE IMMATURE GRANULOCYTE: 0.12 K/UL (ref 0–0.3)
ABSOLUTE LYMPH #: 1.46 K/UL (ref 1.1–3.7)
ABSOLUTE MONO #: 1.28 K/UL (ref 0.1–1.2)
ALBUMIN SERPL-MCNC: 2.9 G/DL (ref 3.5–5.2)
ALBUMIN/GLOBULIN RATIO: 1.1 (ref 1–2.5)
ALP BLD-CCNC: 179 U/L (ref 35–104)
ALT SERPL-CCNC: 73 U/L (ref 5–33)
ANION GAP SERPL CALCULATED.3IONS-SCNC: 13 MMOL/L (ref 9–17)
AST SERPL-CCNC: 42 U/L
BASOPHILS # BLD: 0 % (ref 0–2)
BASOPHILS ABSOLUTE: 0.03 K/UL (ref 0–0.2)
BILIRUB SERPL-MCNC: 0.38 MG/DL (ref 0.3–1.2)
BUN BLDV-MCNC: 13 MG/DL (ref 6–20)
BUN/CREAT BLD: 21 (ref 9–20)
CALCIUM SERPL-MCNC: 8.4 MG/DL (ref 8.6–10.4)
CHLORIDE BLD-SCNC: 103 MMOL/L (ref 98–107)
CO2: 23 MMOL/L (ref 20–31)
CREAT SERPL-MCNC: 0.62 MG/DL (ref 0.5–0.9)
DIFFERENTIAL TYPE: ABNORMAL
EKG ATRIAL RATE: 80 BPM
EKG P AXIS: 34 DEGREES
EKG P-R INTERVAL: 120 MS
EKG Q-T INTERVAL: 350 MS
EKG QRS DURATION: 86 MS
EKG QTC CALCULATION (BAZETT): 403 MS
EKG R AXIS: 7 DEGREES
EKG T AXIS: 25 DEGREES
EKG VENTRICULAR RATE: 80 BPM
EOSINOPHILS RELATIVE PERCENT: 0 % (ref 1–4)
GFR AFRICAN AMERICAN: >60 ML/MIN
GFR NON-AFRICAN AMERICAN: >60 ML/MIN
GFR SERPL CREATININE-BSD FRML MDRD: ABNORMAL ML/MIN/{1.73_M2}
GFR SERPL CREATININE-BSD FRML MDRD: ABNORMAL ML/MIN/{1.73_M2}
GLUCOSE BLD-MCNC: 181 MG/DL (ref 74–100)
GLUCOSE BLD-MCNC: 201 MG/DL (ref 74–100)
GLUCOSE BLD-MCNC: 213 MG/DL (ref 70–99)
GLUCOSE BLD-MCNC: 245 MG/DL (ref 74–100)
GLUCOSE BLD-MCNC: 260 MG/DL (ref 74–100)
HCT VFR BLD CALC: 32.5 % (ref 36.3–47.1)
HEMOGLOBIN: 10.7 G/DL (ref 11.9–15.1)
IMMATURE GRANULOCYTES: 1 %
LACTIC ACID, WHOLE BLOOD: NORMAL MMOL/L (ref 0.7–2.1)
LACTIC ACID: 0.8 MMOL/L (ref 0.5–2.2)
LYMPHOCYTES # BLD: 14 % (ref 24–43)
MCH RBC QN AUTO: 32.1 PG (ref 25.2–33.5)
MCHC RBC AUTO-ENTMCNC: 32.9 G/DL (ref 28.4–34.8)
MCV RBC AUTO: 97.6 FL (ref 82.6–102.9)
MONOCYTES # BLD: 12 % (ref 3–12)
NRBC AUTOMATED: 0 PER 100 WBC
PDW BLD-RTO: 13.2 % (ref 11.8–14.4)
PLATELET # BLD: 261 K/UL (ref 138–453)
PLATELET ESTIMATE: ABNORMAL
PMV BLD AUTO: 9.3 FL (ref 8.1–13.5)
POTASSIUM SERPL-SCNC: 4.1 MMOL/L (ref 3.7–5.3)
PROCALCITONIN: 0.31 NG/ML
RBC # BLD: 3.33 M/UL (ref 3.95–5.11)
RBC # BLD: ABNORMAL 10*6/UL
SEG NEUTROPHILS: 72 % (ref 36–65)
SEGMENTED NEUTROPHILS ABSOLUTE COUNT: 7.67 K/UL (ref 1.5–8.1)
SODIUM BLD-SCNC: 139 MMOL/L (ref 135–144)
TOTAL PROTEIN: 5.5 G/DL (ref 6.4–8.3)
WBC # BLD: 10.6 K/UL (ref 3.5–11.3)
WBC # BLD: ABNORMAL 10*3/UL

## 2018-09-23 PROCEDURE — 36415 COLL VENOUS BLD VENIPUNCTURE: CPT

## 2018-09-23 PROCEDURE — 80053 COMPREHEN METABOLIC PANEL: CPT

## 2018-09-23 PROCEDURE — 6360000002 HC RX W HCPCS: Performed by: INTERNAL MEDICINE

## 2018-09-23 PROCEDURE — 84145 PROCALCITONIN (PCT): CPT

## 2018-09-23 PROCEDURE — 6370000000 HC RX 637 (ALT 250 FOR IP): Performed by: INTERNAL MEDICINE

## 2018-09-23 PROCEDURE — 1200000000 HC SEMI PRIVATE

## 2018-09-23 PROCEDURE — 2580000003 HC RX 258: Performed by: INTERNAL MEDICINE

## 2018-09-23 PROCEDURE — 83605 ASSAY OF LACTIC ACID: CPT

## 2018-09-23 PROCEDURE — 97162 PT EVAL MOD COMPLEX 30 MIN: CPT

## 2018-09-23 PROCEDURE — 97535 SELF CARE MNGMENT TRAINING: CPT

## 2018-09-23 PROCEDURE — G8978 MOBILITY CURRENT STATUS: HCPCS

## 2018-09-23 PROCEDURE — 82947 ASSAY GLUCOSE BLOOD QUANT: CPT

## 2018-09-23 PROCEDURE — 6370000000 HC RX 637 (ALT 250 FOR IP): Performed by: NURSE PRACTITIONER

## 2018-09-23 PROCEDURE — 97110 THERAPEUTIC EXERCISES: CPT

## 2018-09-23 PROCEDURE — G8979 MOBILITY GOAL STATUS: HCPCS

## 2018-09-23 PROCEDURE — 85025 COMPLETE CBC W/AUTO DIFF WBC: CPT

## 2018-09-23 RX ORDER — HYDROCODONE BITARTRATE AND ACETAMINOPHEN 5; 325 MG/1; MG/1
1 TABLET ORAL EVERY 6 HOURS PRN
Status: DISCONTINUED | OUTPATIENT
Start: 2018-09-23 | End: 2018-09-25 | Stop reason: HOSPADM

## 2018-09-23 RX ORDER — BISACODYL 10 MG
10 SUPPOSITORY, RECTAL RECTAL DAILY PRN
Status: DISCONTINUED | OUTPATIENT
Start: 2018-09-23 | End: 2018-09-25 | Stop reason: HOSPADM

## 2018-09-23 RX ORDER — FUROSEMIDE 10 MG/ML
40 INJECTION INTRAMUSCULAR; INTRAVENOUS ONCE
Status: COMPLETED | OUTPATIENT
Start: 2018-09-23 | End: 2018-09-23

## 2018-09-23 RX ADMIN — INSULIN LISPRO 3 UNITS: 100 INJECTION, SOLUTION INTRAVENOUS; SUBCUTANEOUS at 05:26

## 2018-09-23 RX ADMIN — MEROPENEM 1 G: 1 INJECTION, POWDER, FOR SOLUTION INTRAVENOUS at 22:02

## 2018-09-23 RX ADMIN — INSULIN LISPRO 5 UNITS: 100 INJECTION, SOLUTION INTRAVENOUS; SUBCUTANEOUS at 20:41

## 2018-09-23 RX ADMIN — BISACODYL 5 MG: 5 TABLET, COATED ORAL at 09:01

## 2018-09-23 RX ADMIN — ACETAMINOPHEN 1000 MG: 500 TABLET ORAL at 20:39

## 2018-09-23 RX ADMIN — OXYCODONE HYDROCHLORIDE AND ACETAMINOPHEN 2 TABLET: 5; 325 TABLET ORAL at 04:36

## 2018-09-23 RX ADMIN — Medication 10 ML: at 20:43

## 2018-09-23 RX ADMIN — MEROPENEM 1 G: 1 INJECTION, POWDER, FOR SOLUTION INTRAVENOUS at 05:26

## 2018-09-23 RX ADMIN — INSULIN LISPRO 6 UNITS: 100 INJECTION, SOLUTION INTRAVENOUS; SUBCUTANEOUS at 17:36

## 2018-09-23 RX ADMIN — HYDROCODONE BITARTRATE AND ACETAMINOPHEN 1 TABLET: 5; 325 TABLET ORAL at 18:56

## 2018-09-23 RX ADMIN — INSULIN LISPRO 6 UNITS: 100 INJECTION, SOLUTION INTRAVENOUS; SUBCUTANEOUS at 12:50

## 2018-09-23 RX ADMIN — HYDROCODONE BITARTRATE AND ACETAMINOPHEN 1 TABLET: 5; 325 TABLET ORAL at 12:49

## 2018-09-23 RX ADMIN — ENALAPRIL MALEATE 2.5 MG: 2.5 TABLET ORAL at 08:01

## 2018-09-23 RX ADMIN — DILTIAZEM HYDROCHLORIDE 360 MG: 180 CAPSULE, COATED, EXTENDED RELEASE ORAL at 08:02

## 2018-09-23 RX ADMIN — Medication 10 ML: at 09:02

## 2018-09-23 RX ADMIN — ATORVASTATIN CALCIUM 10 MG: 10 TABLET, FILM COATED ORAL at 08:01

## 2018-09-23 RX ADMIN — MEROPENEM 1 G: 1 INJECTION, POWDER, FOR SOLUTION INTRAVENOUS at 15:21

## 2018-09-23 RX ADMIN — POLYETHYLENE GLYCOL 3350 17 G: 17 POWDER, FOR SOLUTION ORAL at 08:01

## 2018-09-23 RX ADMIN — Medication 10 ML: at 08:02

## 2018-09-23 RX ADMIN — ENOXAPARIN SODIUM 40 MG: 40 INJECTION SUBCUTANEOUS at 08:02

## 2018-09-23 RX ADMIN — FUROSEMIDE 40 MG: 10 INJECTION, SOLUTION INTRAMUSCULAR; INTRAVENOUS at 09:02

## 2018-09-23 RX ADMIN — PANTOPRAZOLE SODIUM 40 MG: 40 TABLET, DELAYED RELEASE ORAL at 08:02

## 2018-09-23 ASSESSMENT — PAIN DESCRIPTION - LOCATION
LOCATION: ABDOMEN;BACK
LOCATION: BACK;ABDOMEN

## 2018-09-23 ASSESSMENT — PAIN DESCRIPTION - FREQUENCY
FREQUENCY: CONTINUOUS

## 2018-09-23 ASSESSMENT — PAIN SCALES - GENERAL
PAINLEVEL_OUTOF10: 6
PAINLEVEL_OUTOF10: 5
PAINLEVEL_OUTOF10: 4
PAINLEVEL_OUTOF10: 7
PAINLEVEL_OUTOF10: 2
PAINLEVEL_OUTOF10: 5
PAINLEVEL_OUTOF10: 7
PAINLEVEL_OUTOF10: 6
PAINLEVEL_OUTOF10: 7

## 2018-09-23 ASSESSMENT — PAIN DESCRIPTION - DESCRIPTORS
DESCRIPTORS: ACHING

## 2018-09-23 ASSESSMENT — PAIN DESCRIPTION - PAIN TYPE
TYPE: ACUTE PAIN
TYPE: SURGICAL PAIN
TYPE: SURGICAL PAIN;ACUTE PAIN
TYPE: ACUTE PAIN
TYPE: SURGICAL PAIN;ACUTE PAIN

## 2018-09-23 ASSESSMENT — PAIN DESCRIPTION - ORIENTATION
ORIENTATION: RIGHT

## 2018-09-23 ASSESSMENT — PAIN DESCRIPTION - PROGRESSION
CLINICAL_PROGRESSION: GRADUALLY IMPROVING
CLINICAL_PROGRESSION: GRADUALLY WORSENING
CLINICAL_PROGRESSION: GRADUALLY IMPROVING
CLINICAL_PROGRESSION: GRADUALLY IMPROVING

## 2018-09-23 ASSESSMENT — PAIN DESCRIPTION - ONSET
ONSET: ON-GOING
ONSET: GRADUAL
ONSET: ON-GOING

## 2018-09-23 NOTE — PROGRESS NOTES
Patient has been moved out of ICU. She has been up and ambulating several times. She states that she gets very 'woozy' when she first gets up and ambulates.  Orthos are negative

## 2018-09-23 NOTE — PLAN OF CARE
Problem: Pain:  Goal: Pain level will decrease  Pain level will decrease   Outcome: Ongoing  Patient rated pain @ #5 this morning. Verbalized improvement. Goal: Control of acute pain  Control of acute pain   Outcome: Ongoing  Patient continues with surgical pain. Medicating with PRN Percocet for pain control. Goal: Control of chronic pain  Control of chronic pain   Outcome: Completed Date Met: 09/23/18      Problem: Infection, Septic Shock:  Goal: Will show no infection signs and symptoms  Will show no infection signs and symptoms   Outcome: Ongoing  Patient afebrile. WBC improving with use of antibiotics. Vitals stable. Problem: Serum Glucose Level - Abnormal:  Goal: Ability to maintain appropriate glucose levels will improve  Ability to maintain appropriate glucose levels will improve   Outcome: Ongoing  Continues with elevated glucose levels. Humalog coverage continues per result of POCT. Problem: Tissue Perfusion, Altered:  Goal: Circulatory function within specified parameters  Circulatory function within specified parameters   Outcome: Ongoing  Vitals stable. Patient weaned off oxygen this morning. Monitor shows occasional PVC's this morning. Will continue to monitor. Problem: Falls - Risk of:  Goal: Will remain free from falls  Will remain free from falls   Outcome: Met This Shift  Patient remains free from falls this shift. Goal: Absence of physical injury  Absence of physical injury   Outcome: Completed Date Met: 09/23/18      Problem: Infection - Surgical Site:  Goal: Will show no infection signs and symptoms  Will show no infection signs and symptoms   Outcome: Ongoing  Patient without signs/symptoms of infection. Afebrile. Labs improved. Will continue to monitor.

## 2018-09-23 NOTE — PLAN OF CARE
Problem: Pain:  Goal: Pain level will decrease  Pain level will decrease   Outcome: Ongoing  Continues to have ongoing back pain that radiates to the abdomin and groin. Pain well managed with Norco.    Problem: Tissue Perfusion, Altered:  Goal: Circulatory function within specified parameters  Circulatory function within specified parameters   Outcome: Ongoing  Skin color turgor and temp are normal. HR 87 B/P 147/67. Problem: Falls - Risk of:  Goal: Will remain free from falls  Will remain free from falls   Outcome: Ongoing  Has not fallen thus far this hospital stay. Can ambulate independently. PT is alert and oriented to own limitations. PT knows how to use the call system to ask for assist.    Problem: Infection - Surgical Site:  Goal: Will show no infection signs and symptoms  Will show no infection signs and symptoms   Outcome: Ongoing  Surgical wounds of the abdomin are without signs and symptoms of infection.

## 2018-09-23 NOTE — PROGRESS NOTES
Progress Note    SUBJECTIVE:  FU related to  /  No nausea or vomiting. No BM. Weight up significantly. OBJECTIVE:    Vitals:   TEMPERATURE:  Current - Temp: 97.6 °F (36.4 °C); Max - Temp  Av.4 °F (36.9 °C)  Min: 97.3 °F (36.3 °C)  Max: 99.5 °F (37.5 °C)  RESPIRATIONS RANGE: Resp  Av  Min: 16  Max: 20  PULSE RANGE: Pulse  Av.8  Min: 63  Max: 93  BLOOD PRESSURE RANGE:  Systolic (59XGA), KZE:793 , Min:123 , HSO:030   ; Diastolic (09URS), PP, Min:47, Max:82    PULSE OXIMETRY RANGE: SpO2  Av.2 %  Min: 87 %  Max: 96 %  24HR INTAKE/OUTPUT:    Intake/Output Summary (Last 24 hours) at 18 0845  Last data filed at 18 0400   Gross per 24 hour   Intake             1043 ml   Output             4600 ml   Net            -3557 ml     -----------------------------------------------------------------  Exam:  General: A & O x3  HEENT: Supple neck & negative  Heart: Regular  Lungs: clear to auscultation bilaterally & no retractions  Abdomen: soft and less tender. .. . better  Extremities:  1+   Neuro: NonFocal     -----------------------------------------------------------------  Diagnostic Data:  Lab Results   Component Value Date    WBC 10.6 2018    HGB 10.7 (L) 2018     2018       Lab Results   Component Value Date    BUN 13 2018    CREATININE 0.62 2018     2018    K 4.1 2018    CALCIUM 8.4 (L) 2018     2018    CO2 23 2018    LABGLOM >60 2018       Lab Results   Component Value Date    WBCUA 50  2018    RBCUA 2 TO 5 2018    EPITHUA 0 TO 2 2018    LEUKOCYTESUR MODERATE (A) 2018    SPECGRAV 1.020 2018    GLUCOSEU NEGATIVE 2018    KETUA 2+ (A) 2018    PROTEINU 1+ (A) 2018    HGBUR 2+ (A) 2018    CASTUA NOT REPORTED 2018    CRYSTUA NOT REPORTED 2018    BACTERIA 1+ (A) 2018    YEAST NOT REPORTED 2018       No results found for:

## 2018-09-23 NOTE — PROGRESS NOTES
Physical Therapy    Facility/Department: [facility] THE [facility] MED SURG  Initial Assessment    NAME: Luci Gary  : 1960  MRN: [id number]    Date of Service: 2018    Discharge Recommendations:  Continue to assess pending progress, Home with assist PRN     Patient Diagnosis(es): The primary encounter diagnosis was Sepsis, due to unspecified organism ([facility] [address] A diagnosis of UTI due to extended-spectrum beta lactamase (ESBL) producing Escherichia coli was also pertinent to this visit. has a past medical history of Diabetes mellitus ([facility] [address] H/O cardiovascular stress test; History of Holter monitoring; History of Holter monitoring; and PONV (postoperative nausea and vomiting). has a past surgical history that includes Tubal ligation (); Rotator cuff repair (Left, ); eye surgery (Bilateral, ); Colonoscopy (2017); and Cholecystectomy (2018). Restrictions  Restrictions/Precautions  Restrictions/Precautions: General Precautions, Fall Risk     Vision/Hearing  Vision: Within Functional Limits  Hearing: Within functional limits       Subjective  General  Chart Reviewed: Yes  Patient assessed for rehabilitation services?: Yes  Response To Previous Treatment: Patient with no complaints from previous session.   Family / Caregiver Present: No  Follows Commands: Within Functional Limits  Pain Screening  Patient Currently in Pain: Yes     Orientation  Orientation  Overall Orientation Status: Within Normal Limits    Social/Functional History  Social/Functional History  Lives With: Significant other  Type of Home: House  Home Layout: Two level, Bed/Bath upstairs, Able to Live on Main level with bedroom/bathroom  Home Access: Stairs to enter with rails  Entrance Stairs - Number of Steps: 2  Entrance Stairs - Rails: Left  Bathroom Shower/Tub: Walk-in shower  Bathroom Toilet: Standard  Bathroom Accessibility: Accessible  Receives Help From: Family  ADL Assistance: Independent  Homemaking Assistance:

## 2018-09-24 ENCOUNTER — APPOINTMENT (OUTPATIENT)
Dept: CT IMAGING | Age: 58
DRG: 854 | End: 2018-09-24
Attending: INTERNAL MEDICINE
Payer: COMMERCIAL

## 2018-09-24 ENCOUNTER — APPOINTMENT (OUTPATIENT)
Dept: GENERAL RADIOLOGY | Age: 58
DRG: 854 | End: 2018-09-24
Attending: INTERNAL MEDICINE
Payer: COMMERCIAL

## 2018-09-24 ENCOUNTER — APPOINTMENT (OUTPATIENT)
Dept: NON INVASIVE DIAGNOSTICS | Age: 58
DRG: 854 | End: 2018-09-24
Attending: INTERNAL MEDICINE
Payer: COMMERCIAL

## 2018-09-24 LAB
ABSOLUTE EOS #: 0.14 K/UL (ref 0–0.44)
ABSOLUTE IMMATURE GRANULOCYTE: 0.2 K/UL (ref 0–0.3)
ABSOLUTE LYMPH #: 1.37 K/UL (ref 1.1–3.7)
ABSOLUTE MONO #: 0.98 K/UL (ref 0.1–1.2)
ALBUMIN SERPL-MCNC: 2.8 G/DL (ref 3.5–5.2)
ALBUMIN/GLOBULIN RATIO: 0.9 (ref 1–2.5)
ALP BLD-CCNC: 198 U/L (ref 35–104)
ALT SERPL-CCNC: 72 U/L (ref 5–33)
ANION GAP SERPL CALCULATED.3IONS-SCNC: 10 MMOL/L (ref 9–17)
AST SERPL-CCNC: 46 U/L
BASOPHILS # BLD: 0 % (ref 0–2)
BASOPHILS ABSOLUTE: 0.04 K/UL (ref 0–0.2)
BILIRUB SERPL-MCNC: 0.38 MG/DL (ref 0.3–1.2)
BNP INTERPRETATION: ABNORMAL
BUN BLDV-MCNC: 10 MG/DL (ref 6–20)
BUN/CREAT BLD: 18 (ref 9–20)
CALCIUM SERPL-MCNC: 9 MG/DL (ref 8.6–10.4)
CHLORIDE BLD-SCNC: 98 MMOL/L (ref 98–107)
CO2: 34 MMOL/L (ref 20–31)
CREAT SERPL-MCNC: 0.56 MG/DL (ref 0.5–0.9)
DIFFERENTIAL TYPE: ABNORMAL
EOSINOPHILS RELATIVE PERCENT: 2 % (ref 1–4)
GFR AFRICAN AMERICAN: >60 ML/MIN
GFR NON-AFRICAN AMERICAN: >60 ML/MIN
GFR SERPL CREATININE-BSD FRML MDRD: ABNORMAL ML/MIN/{1.73_M2}
GFR SERPL CREATININE-BSD FRML MDRD: ABNORMAL ML/MIN/{1.73_M2}
GLUCOSE BLD-MCNC: 102 MG/DL (ref 74–100)
GLUCOSE BLD-MCNC: 114 MG/DL (ref 70–99)
GLUCOSE BLD-MCNC: 184 MG/DL (ref 74–100)
GLUCOSE BLD-MCNC: 299 MG/DL (ref 74–100)
GLUCOSE BLD-MCNC: 332 MG/DL (ref 74–100)
HCT VFR BLD CALC: 34.7 % (ref 36.3–47.1)
HEMOGLOBIN: 11.8 G/DL (ref 11.9–15.1)
IMMATURE GRANULOCYTES: 2 %
LV EF: 70 %
LVEF MODALITY: NORMAL
LYMPHOCYTES # BLD: 15 % (ref 24–43)
MAGNESIUM: 1.9 MG/DL (ref 1.6–2.6)
MCH RBC QN AUTO: 31.6 PG (ref 25.2–33.5)
MCHC RBC AUTO-ENTMCNC: 34 G/DL (ref 28.4–34.8)
MCV RBC AUTO: 92.8 FL (ref 82.6–102.9)
MONOCYTES # BLD: 11 % (ref 3–12)
NRBC AUTOMATED: 0 PER 100 WBC
PDW BLD-RTO: 12.9 % (ref 11.8–14.4)
PHOSPHORUS: 2.5 MG/DL (ref 2.6–4.5)
PLATELET # BLD: 328 K/UL (ref 138–453)
PLATELET ESTIMATE: ABNORMAL
PMV BLD AUTO: 9.2 FL (ref 8.1–13.5)
POTASSIUM SERPL-SCNC: 3.9 MMOL/L (ref 3.7–5.3)
PRO-BNP: 381 PG/ML
RBC # BLD: 3.74 M/UL (ref 3.95–5.11)
RBC # BLD: ABNORMAL 10*6/UL
SEG NEUTROPHILS: 70 % (ref 36–65)
SEGMENTED NEUTROPHILS ABSOLUTE COUNT: 6.36 K/UL (ref 1.5–8.1)
SODIUM BLD-SCNC: 142 MMOL/L (ref 135–144)
SURGICAL PATHOLOGY REPORT: NORMAL
TOTAL PROTEIN: 5.9 G/DL (ref 6.4–8.3)
TROPONIN INTERP: NORMAL
TROPONIN INTERP: NORMAL
TROPONIN T: <0.03 NG/ML
TROPONIN T: <0.03 NG/ML
WBC # BLD: 9.1 K/UL (ref 3.5–11.3)
WBC # BLD: ABNORMAL 10*3/UL

## 2018-09-24 PROCEDURE — 99254 IP/OBS CNSLTJ NEW/EST MOD 60: CPT | Performed by: FAMILY MEDICINE

## 2018-09-24 PROCEDURE — 2580000003 HC RX 258: Performed by: PHYSICIAN ASSISTANT

## 2018-09-24 PROCEDURE — 83880 ASSAY OF NATRIURETIC PEPTIDE: CPT

## 2018-09-24 PROCEDURE — 71046 X-RAY EXAM CHEST 2 VIEWS: CPT

## 2018-09-24 PROCEDURE — 93306 TTE W/DOPPLER COMPLETE: CPT

## 2018-09-24 PROCEDURE — 97110 THERAPEUTIC EXERCISES: CPT

## 2018-09-24 PROCEDURE — 97116 GAIT TRAINING THERAPY: CPT

## 2018-09-24 PROCEDURE — 80053 COMPREHEN METABOLIC PANEL: CPT

## 2018-09-24 PROCEDURE — 97535 SELF CARE MNGMENT TRAINING: CPT

## 2018-09-24 PROCEDURE — 93005 ELECTROCARDIOGRAM TRACING: CPT

## 2018-09-24 PROCEDURE — 84484 ASSAY OF TROPONIN QUANT: CPT

## 2018-09-24 PROCEDURE — 84100 ASSAY OF PHOSPHORUS: CPT

## 2018-09-24 PROCEDURE — 36415 COLL VENOUS BLD VENIPUNCTURE: CPT

## 2018-09-24 PROCEDURE — 2580000003 HC RX 258: Performed by: INTERNAL MEDICINE

## 2018-09-24 PROCEDURE — 6360000002 HC RX W HCPCS: Performed by: INTERNAL MEDICINE

## 2018-09-24 PROCEDURE — 71275 CT ANGIOGRAPHY CHEST: CPT

## 2018-09-24 PROCEDURE — 83735 ASSAY OF MAGNESIUM: CPT

## 2018-09-24 PROCEDURE — 82947 ASSAY GLUCOSE BLOOD QUANT: CPT

## 2018-09-24 PROCEDURE — 6370000000 HC RX 637 (ALT 250 FOR IP): Performed by: INTERNAL MEDICINE

## 2018-09-24 PROCEDURE — 85025 COMPLETE CBC W/AUTO DIFF WBC: CPT

## 2018-09-24 PROCEDURE — 71260 CT THORAX DX C+: CPT

## 2018-09-24 PROCEDURE — 1200000000 HC SEMI PRIVATE

## 2018-09-24 PROCEDURE — 6370000000 HC RX 637 (ALT 250 FOR IP): Performed by: NURSE PRACTITIONER

## 2018-09-24 PROCEDURE — 6360000004 HC RX CONTRAST MEDICATION: Performed by: PHYSICIAN ASSISTANT

## 2018-09-24 RX ORDER — SODIUM CHLORIDE 0.9 % (FLUSH) 0.9 %
10 SYRINGE (ML) INJECTION PRN
Status: DISCONTINUED | OUTPATIENT
Start: 2018-09-24 | End: 2018-09-25 | Stop reason: HOSPADM

## 2018-09-24 RX ORDER — SODIUM CHLORIDE 0.9 % (FLUSH) 0.9 %
10 SYRINGE (ML) INJECTION EVERY 12 HOURS SCHEDULED
Status: DISCONTINUED | OUTPATIENT
Start: 2018-09-24 | End: 2018-09-25 | Stop reason: HOSPADM

## 2018-09-24 RX ORDER — FUROSEMIDE 10 MG/ML
40 INJECTION INTRAMUSCULAR; INTRAVENOUS ONCE
Status: COMPLETED | OUTPATIENT
Start: 2018-09-24 | End: 2018-09-24

## 2018-09-24 RX ORDER — LIDOCAINE HYDROCHLORIDE 10 MG/ML
5 INJECTION, SOLUTION INFILTRATION; PERINEURAL ONCE
Status: COMPLETED | OUTPATIENT
Start: 2018-09-24 | End: 2018-09-25

## 2018-09-24 RX ADMIN — HYDROCODONE BITARTRATE AND ACETAMINOPHEN 1 TABLET: 5; 325 TABLET ORAL at 06:58

## 2018-09-24 RX ADMIN — LORAZEPAM 0.5 MG: 0.5 TABLET ORAL at 21:41

## 2018-09-24 RX ADMIN — HYDROCODONE BITARTRATE AND ACETAMINOPHEN 1 TABLET: 5; 325 TABLET ORAL at 21:40

## 2018-09-24 RX ADMIN — POLYETHYLENE GLYCOL 3350 17 G: 17 POWDER, FOR SOLUTION ORAL at 09:17

## 2018-09-24 RX ADMIN — ATORVASTATIN CALCIUM 10 MG: 10 TABLET, FILM COATED ORAL at 09:21

## 2018-09-24 RX ADMIN — MEROPENEM 1 G: 1 INJECTION, POWDER, FOR SOLUTION INTRAVENOUS at 15:04

## 2018-09-24 RX ADMIN — MEROPENEM 1 G: 1 INJECTION, POWDER, FOR SOLUTION INTRAVENOUS at 05:24

## 2018-09-24 RX ADMIN — FUROSEMIDE 40 MG: 10 INJECTION, SOLUTION INTRAMUSCULAR; INTRAVENOUS at 06:58

## 2018-09-24 RX ADMIN — Medication 10 ML: at 21:00

## 2018-09-24 RX ADMIN — DILTIAZEM HYDROCHLORIDE 360 MG: 180 CAPSULE, COATED, EXTENDED RELEASE ORAL at 09:20

## 2018-09-24 RX ADMIN — INSULIN LISPRO 5 UNITS: 100 INJECTION, SOLUTION INTRAVENOUS; SUBCUTANEOUS at 20:41

## 2018-09-24 RX ADMIN — MEROPENEM 1 G: 1 INJECTION, POWDER, FOR SOLUTION INTRAVENOUS at 21:41

## 2018-09-24 RX ADMIN — PANTOPRAZOLE SODIUM 40 MG: 40 TABLET, DELAYED RELEASE ORAL at 09:22

## 2018-09-24 RX ADMIN — ENOXAPARIN SODIUM 40 MG: 40 INJECTION SUBCUTANEOUS at 09:16

## 2018-09-24 RX ADMIN — ENALAPRIL MALEATE 2.5 MG: 2.5 TABLET ORAL at 09:21

## 2018-09-24 RX ADMIN — INSULIN LISPRO 3 UNITS: 100 INJECTION, SOLUTION INTRAVENOUS; SUBCUTANEOUS at 17:16

## 2018-09-24 RX ADMIN — HYDROCODONE BITARTRATE AND ACETAMINOPHEN 1 TABLET: 5; 325 TABLET ORAL at 15:03

## 2018-09-24 RX ADMIN — IOPAMIDOL 75 ML: 755 INJECTION, SOLUTION INTRAVENOUS at 08:54

## 2018-09-24 RX ADMIN — Medication 10 ML: at 06:58

## 2018-09-24 RX ADMIN — Medication 10 ML: at 20:43

## 2018-09-24 RX ADMIN — LORAZEPAM 0.5 MG: 0.5 TABLET ORAL at 06:58

## 2018-09-24 RX ADMIN — INSULIN LISPRO 12 UNITS: 100 INJECTION, SOLUTION INTRAVENOUS; SUBCUTANEOUS at 11:31

## 2018-09-24 ASSESSMENT — PAIN DESCRIPTION - PROGRESSION
CLINICAL_PROGRESSION: GRADUALLY IMPROVING

## 2018-09-24 ASSESSMENT — PAIN SCALES - GENERAL
PAINLEVEL_OUTOF10: 3
PAINLEVEL_OUTOF10: 4
PAINLEVEL_OUTOF10: 5
PAINLEVEL_OUTOF10: 7
PAINLEVEL_OUTOF10: 3
PAINLEVEL_OUTOF10: 7
PAINLEVEL_OUTOF10: 6

## 2018-09-24 ASSESSMENT — PAIN DESCRIPTION - DESCRIPTORS
DESCRIPTORS: ACHING
DESCRIPTORS: ACHING
DESCRIPTORS: HEADACHE
DESCRIPTORS: ACHING

## 2018-09-24 ASSESSMENT — PAIN DESCRIPTION - ORIENTATION
ORIENTATION: OTHER (COMMENT)
ORIENTATION: OTHER (COMMENT)

## 2018-09-24 ASSESSMENT — PAIN DESCRIPTION - LOCATION
LOCATION: ABDOMEN;CHEST
LOCATION: ABDOMEN
LOCATION: HEAD
LOCATION: ABDOMEN;CHEST;HEAD

## 2018-09-24 ASSESSMENT — PAIN DESCRIPTION - FREQUENCY
FREQUENCY: CONTINUOUS

## 2018-09-24 ASSESSMENT — PAIN DESCRIPTION - PAIN TYPE
TYPE: ACUTE PAIN

## 2018-09-24 NOTE — PROGRESS NOTES
Nutrition Assessment    Type and Reason for Visit: Reassess    Nutrition Recommendations:  Continue current diet    Nutrition Assessment:  · Subjective Assessment: States doing alright with food choices and monitoring carbs. Doesn't think she'll get back on her insulin pump until she gets discharged to home. · Nutrition-Focused Physical Findings: appears well nourished  · Wound Type: None  · Current Nutrition Therapies:  · Oral Diet Orders: Carb Control 4 Carbs/Meal   · Oral Diet intake: %  · Oral Nutrition Supplement (ONS) Orders: None  · Anthropometric Measures:  · Ht: 5' 5\" (165.1 cm)   · Current Body Wt: 166 lb 8 oz (75.5 kg)  · Admission Body Wt: 159 lb 1.6 oz (72.2 kg)  · Usual Body Wt:  (155-158#)  · % Weight Change: 5.3% gain,  above usual \"high\" weight, acute change  · Ideal Body Wt: 125 lb (56.7 kg), % Ideal Body 133%  · Adjusted Body Wt: 133 lb 13.1 oz (60.7 kg), body weight adjusted for  (overweight)  · BMI Classification: BMI 25.0 - 29.9 Overweight  · Comparative Standards (Estimated Nutrition Needs):  · Estimated Daily Total Kcal: 8005-7696  · Estimated Daily Protein (g): 73-79    Lab Results   Component Value Date     09/24/2018    K 3.9 09/24/2018    CL 98 09/24/2018    CO2 34 (H) 09/24/2018    BUN 10 09/24/2018    CREATININE 0.56 09/24/2018    GLUCOSE 114 (H) 09/24/2018    CALCIUM 9.0 09/24/2018    PROT 5.9 (L) 09/24/2018    LABALBU 2.8 (L) 09/24/2018    BILITOT 0.38 09/24/2018    ALKPHOS 198 (H) 09/24/2018    AST 46 (H) 09/24/2018    ALT 72 (H) 09/24/2018    LABGLOM >60 09/24/2018    GFRAA >60 09/24/2018     Recent Labs      09/22/18 2055 09/22/18   2347  09/23/18   0519  09/23/18   1139  09/23/18   1631  09/23/18   2025  09/24/18   0707  09/24/18   1058   POCGLU  197*  183*  181*  245*  201*  260*  102*  332*     Estimated Intake vs Estimated Needs: Intake Meets Needs    Nutrition Risk Level: Moderate    PO intakes appear good, tolerating diet post op.   States she is choosing

## 2018-09-24 NOTE — PROGRESS NOTES
FINDINGS: Lower Chest:  Visualized portion of the lower chest demonstrates no acute abnormality. Organs: No hydronephrosis or nephrolithiasis. Mild right kidney pelvocaliectasis with urothelial hyperdensity. No obstructive uropathy identified. Trace right greater left perinephric stranding. The adrenal glands, liver, gallbladder, spleen and pancreas are within normal limits for noncontrast examination. GI/Bowel: Large bowel with moderate to large volume of fecal material.  The appendix is not visualized but no pericecal inflammatory changes are noted. Pelvis: The urinary bladder is unremarkable. The uterus is in situ. No significant free fluid. Peritoneum/Retroperitoneum: The abdominal aorta is without aneurysmal dilatation. No lymphadenopathy, free fluid or free air. Bones/Soft Tissues: There are degenerative changes of the lumbar spine but no suspicious osseous lesion. Noncontrast examination is without convincing evidence of acute abdominopelvic abnormality. However, right kidney mild pelvicaliectasis with questionable urothelial hyperdensity may be physiologic or sequela of urinary tract infection, in the appropriate clinical setting. Xr Chest Standard (2 Vw)    Result Date: 9/19/2018  EXAMINATION: TWO VIEWS OF THE CHEST 9/19/2018 3:40 am COMPARISON: October 23, 2017 HISTORY: ORDERING SYSTEM PROVIDED HISTORY: sepsis TECHNOLOGIST PROVIDED HISTORY: sepsis FINDINGS: The mediastinal and cardiac contours are normal. No lobar lung consolidation, pleural effusion or pneumothorax. The bones are stable. .     No radiographic evidence of acute cardiopulmonary disease. Ct Abdomen W Contrast Additional Contrast? None    Result Date: 9/20/2018  EXAMINATION: CT ABDOMEN AND PELVIS WITH CONTRAST 9/20/2018 8:38 am TECHNIQUE: CT of the abdomen was performed with the administration of intravenous contrast. Multiplanar reformatted images are provided for review.  Dose modulation, iterative reconstruction, and/or effusions in the lung bases. Us Gallbladder Ruq    Result Date: 9/20/2018  EXAMINATION: RIGHT UPPER QUADRANT ULTRASOUND 9/20/2018 8:54 am COMPARISON: CT 09/20/2018 HISTORY: ORDERING SYSTEM PROVIDED HISTORY: RUQ PAIN, FEVER, ELEV WBC, MURPHYS SIGN FINDINGS: LIVER:  Image portions of the liver show no focal abnormality. No intrahepatic biliary dilatation is seen. BILIARY SYSTEM:  The gallbladder shows no evidence of cholelithiasis. There is gallbladder wall thickening measuring up to 7.3 mm. There may be a trace amount of pericholecystic fluid. Technologist indicates a positive sonographic Stovall's sign. Findings could represent acalculous cholecystitis. Common bile duct is within normal limits measuring 4.2 mm. RIGHT KIDNEY: The right kidney is grossly unremarkable without evidence of hydronephrosis. PANCREAS:  Visualized portions of the pancreas are unremarkable. OTHER: No evidence of right upper quadrant ascites. Gallbladder wall thickening and perhaps minimal pericholecystic fluid with a positive sonographic Stovall's sign suggesting possible acalculous cholecystitis. No sonographic evidence for cholelithiasis. HIDA scan could assess for acute acalculous cholecystitis. Nm Hepatobiliary Scan W Ejection Fraction    Result Date: 9/20/2018  EXAMINATION: NUCLEAR MEDICINE HEPATOBILIARY SCINTIGRAPHY (HIDA SCAN). 9/20/2018 11:16 am TECHNIQUE: Approximately 5.4 [id number] Mebrofenin (Choletec) was administered IV. Then, dynamic images of the abdomen were obtained in the anterior projection for 60 mins. A right lateral view was also obtained at 60 mins. At 65 minutes and upon visualization of the gallbladder, 1.53 mcg of Kinevac was administered intravenously to evaluate gallbladder ejection fraction. COMPARISON: Ultrasound of the gallbladder of 09/20/2018.  HISTORY: ORDERING SYSTEM PROVIDED HISTORY: sepsis need to rule out cystic duct obstruction FINDINGS: Prompt, homogenous uptake by the liver is

## 2018-09-24 NOTE — PROGRESS NOTES
The discharge plan is still home with [facility] care for IV medication.   MARIANNE Pedro

## 2018-09-24 NOTE — PROGRESS NOTES
Physical Therapy  Facility/Department: [facility] AT THE [facility] MED SURG  Daily Treatment Note  NAME: Tonia Leonard  : 1960  MRN: [id number]    Date of Service: 2018    Discharge Recommendations:  Continue to assess pending progress, Home with assist PRN        Patient Diagnosis(es): The primary encounter diagnosis was Sepsis, due to unspecified organism ([facility] [address] A diagnosis of UTI due to extended-spectrum beta lactamase (ESBL) producing Escherichia coli was also pertinent to this visit. has a past medical history of Diabetes mellitus ([facility] [address] H/O cardiovascular stress test; History of Holter monitoring; History of Holter monitoring; and PONV (postoperative nausea and vomiting). has a past surgical history that includes Tubal ligation (); Rotator cuff repair (Left, ); eye surgery (Bilateral, ); Colonoscopy (2017); and Cholecystectomy (2018). Restrictions  Restrictions/Precautions  Restrictions/Precautions: General Precautions, Fall Risk  Subjective   General  Chart Reviewed: Yes  Subjective  Subjective: Pt reported no pain currently. Orientation  Orientation  Overall Orientation Status: Within Normal Limits  Objective   Bed mobility  Supine to Sit: Supervision  Sit to Supine: Supervision  Transfers  Sit to Stand: Contact guard assistance;Stand by assistance  Stand to sit: Contact guard assistance;Stand by assistance  Ambulation  Ambulation?: Yes  Ambulation 1  Surface: level tile  Device: No Device  Assistance: Contact guard assistance  Distance: 75 feet x 1  Comments: Verbal cues for posture. Pt needed 1-2 rest breaks d/t fatigue/KILGORE. SPO2 93% after amb. Stairs/Curb  Stairs?: No     Balance  Posture: Fair  Sitting - Static: Good  Sitting - Dynamic: Good  Standing - Static: Good;-  Standing - Dynamic: Fair;+;Good;-  Exercises  Straight Leg Raise: 15x  Heelslides: 15x  Hip Flexion: 15x  Hip Abduction: 15x  Ankle Pumps: 20x  Comments: Above exercises completed in supine.  2-3 rest

## 2018-09-24 NOTE — CONSULTS
without evidence of significant myocardial ischemia or infarction. EF 66%. The pt Flores treadmill score is 4 which correlates with a low/intermiediate risk for CAD.  History of Holter monitoring 02/16/2018    Very frequent PVC's. No symptoms were reported. Very frequent premature ventricular ectopic beats total 5610 consisting of 5610 isolated PVC's.  History of Holter monitoring 02/15/2018    Very frequent PVC's. No symptoms were reported. Consider additional testing and/or treatment if clinically indicated.  PONV (postoperative nausea and vomiting)        CURRENT ALLERGIES: Bee venom; Sulfa antibiotics; and Macrobid [nitrofurantoin macrocrystal] REVIEW OF SYSTEMS: 14 systems were reviewed. Pertinent positives and negatives as above, all else negative. Past Surgical History:   Procedure Laterality Date    CHOLECYSTECTOMY  09/20/2018    CHOLECYSTECTOMY LAPAROSCOPIC ROBOTIC (N/A )    COLONOSCOPY  01/11/2017    -polyp(adenomatous polyp)    EYE SURGERY Bilateral 2007    ROTATOR CUFF REPAIR Left 2009    TUBAL LIGATION  1993    Social History:  Social History   Substance Use Topics    Smoking status: Former Smoker    Smokeless tobacco: Never Used    Alcohol use Yes      Comment: social        CURRENT MEDICATIONS:  Outpatient Prescriptions Marked as Taking for the 9/19/18 encounter [facility] Encounter)   Medication Sig Dispense Refill    meropenem (MERREM) infusion Infuse 1,000 mg intravenously every 8 hours for 7 days Compound per protocol.  91619 mg 0         bisacodyl (DULCOLAX) EC tablet 5 mg Daily PRN   HYDROcodone-acetaminophen (NORCO) 5-325 MG per tablet 1 tablet Q6H PRN   insulin lispro (HUMALOG) injection pen 0-18 Units TID WC   insulin lispro (HUMALOG) injection pen 0-9 Units Nightly   bisacodyl (DULCOLAX) suppository 10 mg Daily PRN   diphenhydrAMINE (BENADRYL) capsule 25 mg Q6H PRN   meropenem (MERREM) 1 g in sterile water 20 mL IV syringe Q8H   polyethylene glycol (GLYCOLAX)

## 2018-09-24 NOTE — PROGRESS NOTES
Physical Therapy  Facility/Department: [facility] AT THE [facility] MED SURG  Daily Treatment Note  NAME: Fernanda Syed  : 1960  MRN: [id number]    Date of Service: 2018    Discharge Recommendations:  Continue to assess pending progress, Home with assist PRN        Patient Diagnosis(es): The primary encounter diagnosis was Sepsis, due to unspecified [facility] ([facility] A diagnosis of UTI due to extended-spectrum beta lactamase (ESBL) producing Escherichia coli was also pertinent to this visit. has a past medical history of Diabetes mellitus ([facility] [address] H/O cardiovascular stress test; History of Holter monitoring; History of Holter monitoring; and PONV (postoperative nausea and vomiting). has a past surgical history that includes Tubal ligation (); Rotator cuff repair (Left, ); eye surgery (Bilateral, ); Colonoscopy (2017); and Cholecystectomy (2018). Restrictions  Restrictions/Precautions  Restrictions/Precautions: General Precautions, Fall Risk  Subjective   General  Chart Reviewed: Yes  Response To Previous Treatment: Patient with no complaints from previous session. Family / Caregiver Present: No  Subjective  Subjective: Pt reports pain t 6-7/10 in abdomen/LB          Orientation  Orientation  Overall Orientation Status: Within Functional Limits  Objective   Bed mobility  Supine to Sit: Supervision  Sit to Supine: Supervision  Transfers  Sit to Stand: Stand by assistance;Contact guard assistance  Stand to sit: Stand by assistance;Contact guard assistance  Ambulation  Ambulation?: Yes  Ambulation 1  Surface: level tile  Device: No Device  Assistance: Stand by assistance;Contact guard assistance  Quality of Gait: slow jorge l  Distance: 60 ft x1, 15 ft x1 in room  Comments: Required VCs for posture. Assessment   Body structures, Functions, Activity limitations: Decreased functional mobility ; Decreased strength;Decreased endurance  Assessment: Pt demonstrated fair+ tolerance to tx. limited by soreness  Treatment Diagnosis: general weakness  Prognosis: Good  Patient Education: Educated pt on importance of daily exercise and ambulation when able  REQUIRES PT FOLLOW UP: Yes     G-Code     OutComes Score                                                    AM-PAC Score             Goals  Short term goals  Time Frame for Short term goals: 10 days  Short term goal 1: Pt to ambulate 300+ ft with no LOB. Short term goal 2: Pt to demo good dynamic standing balance. Short term goal 3: Pt to perform all bed mob, transfers, and gait independently. Short term goal 4: Pt to tolerate 20-30 mins ther ex/act for improved strength. Patient Goals   Patient goals : home following discharge    Plan    Plan  Times per week: 7 x week  Times per day: Twice a day  Current Treatment Recommendations: Strengthening, Balance Training, Functional Mobility Training, Transfer Training, Stair training, Gait Training, Endurance Training, Safety Education & Training, Home Exercise Program, Neuromuscular Re-education  Safety Devices  Type of devices:  All fall risk precautions in place, Call light within reach, Nurse notified (left on toilet)     Therapy Time   Individual Concurrent Group Co-treatment   Time In [address]         Time Out 0240         Minutes [address] [facility], [id number]

## 2018-09-25 ENCOUNTER — APPOINTMENT (OUTPATIENT)
Dept: GENERAL RADIOLOGY | Age: 58
DRG: 854 | End: 2018-09-25
Attending: INTERNAL MEDICINE
Payer: COMMERCIAL

## 2018-09-25 VITALS
HEIGHT: 65 IN | TEMPERATURE: 99.1 F | DIASTOLIC BLOOD PRESSURE: 74 MMHG | OXYGEN SATURATION: 95 % | BODY MASS INDEX: 27.29 KG/M2 | HEART RATE: 64 BPM | SYSTOLIC BLOOD PRESSURE: 145 MMHG | WEIGHT: 163.8 LBS | RESPIRATION RATE: 16 BRPM

## 2018-09-25 PROBLEM — B96.20 BACTEREMIA DUE TO ESCHERICHIA COLI: Status: ACTIVE | Noted: 2018-09-25

## 2018-09-25 PROBLEM — R78.81 BACTEREMIA DUE TO ESCHERICHIA COLI: Status: ACTIVE | Noted: 2018-09-25

## 2018-09-25 LAB
ABSOLUTE EOS #: 0.21 K/UL (ref 0–0.44)
ABSOLUTE IMMATURE GRANULOCYTE: 0.24 K/UL (ref 0–0.3)
ABSOLUTE LYMPH #: 1.57 K/UL (ref 1.1–3.7)
ABSOLUTE MONO #: 0.9 K/UL (ref 0.1–1.2)
ALBUMIN SERPL-MCNC: 3 G/DL (ref 3.5–5.2)
ALBUMIN/GLOBULIN RATIO: 1 (ref 1–2.5)
ALP BLD-CCNC: 177 U/L (ref 35–104)
ALT SERPL-CCNC: 50 U/L (ref 5–33)
ANION GAP SERPL CALCULATED.3IONS-SCNC: 8 MMOL/L (ref 9–17)
AST SERPL-CCNC: 19 U/L
BASOPHILS # BLD: 1 % (ref 0–2)
BASOPHILS ABSOLUTE: 0.07 K/UL (ref 0–0.2)
BILIRUB SERPL-MCNC: 0.3 MG/DL (ref 0.3–1.2)
BUN BLDV-MCNC: 14 MG/DL (ref 6–20)
BUN/CREAT BLD: 23 (ref 9–20)
CALCIUM SERPL-MCNC: 9.3 MG/DL (ref 8.6–10.4)
CHLORIDE BLD-SCNC: 99 MMOL/L (ref 98–107)
CO2: 34 MMOL/L (ref 20–31)
CREAT SERPL-MCNC: 0.61 MG/DL (ref 0.5–0.9)
DIFFERENTIAL TYPE: ABNORMAL
EKG ATRIAL RATE: 70 BPM
EKG ATRIAL RATE: 75 BPM
EKG P AXIS: 37 DEGREES
EKG P AXIS: 40 DEGREES
EKG P-R INTERVAL: 124 MS
EKG P-R INTERVAL: 126 MS
EKG Q-T INTERVAL: 408 MS
EKG Q-T INTERVAL: 414 MS
EKG QRS DURATION: 88 MS
EKG QRS DURATION: 92 MS
EKG QTC CALCULATION (BAZETT): 447 MS
EKG QTC CALCULATION (BAZETT): 455 MS
EKG R AXIS: 10 DEGREES
EKG R AXIS: 11 DEGREES
EKG T AXIS: 20 DEGREES
EKG T AXIS: 29 DEGREES
EKG VENTRICULAR RATE: 70 BPM
EKG VENTRICULAR RATE: 75 BPM
EOSINOPHILS RELATIVE PERCENT: 3 % (ref 1–4)
GFR AFRICAN AMERICAN: >60 ML/MIN
GFR NON-AFRICAN AMERICAN: >60 ML/MIN
GFR SERPL CREATININE-BSD FRML MDRD: ABNORMAL ML/MIN/{1.73_M2}
GFR SERPL CREATININE-BSD FRML MDRD: ABNORMAL ML/MIN/{1.73_M2}
GLUCOSE BLD-MCNC: 160 MG/DL (ref 74–100)
GLUCOSE BLD-MCNC: 164 MG/DL (ref 70–99)
GLUCOSE BLD-MCNC: 239 MG/DL (ref 74–100)
HCT VFR BLD CALC: 36.1 % (ref 36.3–47.1)
HEMOGLOBIN: 11.9 G/DL (ref 11.9–15.1)
IMMATURE GRANULOCYTES: 3 %
LYMPHOCYTES # BLD: 20 % (ref 24–43)
MCH RBC QN AUTO: 31.4 PG (ref 25.2–33.5)
MCHC RBC AUTO-ENTMCNC: 33 G/DL (ref 28.4–34.8)
MCV RBC AUTO: 95.3 FL (ref 82.6–102.9)
MONOCYTES # BLD: 12 % (ref 3–12)
NRBC AUTOMATED: 0 PER 100 WBC
PDW BLD-RTO: 12.8 % (ref 11.8–14.4)
PLATELET # BLD: 367 K/UL (ref 138–453)
PLATELET ESTIMATE: ABNORMAL
PMV BLD AUTO: 8.9 FL (ref 8.1–13.5)
POTASSIUM SERPL-SCNC: 4 MMOL/L (ref 3.7–5.3)
RBC # BLD: 3.79 M/UL (ref 3.95–5.11)
RBC # BLD: ABNORMAL 10*6/UL
SEG NEUTROPHILS: 61 % (ref 36–65)
SEGMENTED NEUTROPHILS ABSOLUTE COUNT: 4.69 K/UL (ref 1.5–8.1)
SODIUM BLD-SCNC: 141 MMOL/L (ref 135–144)
TOTAL PROTEIN: 6.1 G/DL (ref 6.4–8.3)
WBC # BLD: 7.7 K/UL (ref 3.5–11.3)
WBC # BLD: ABNORMAL 10*3/UL

## 2018-09-25 PROCEDURE — 6370000000 HC RX 637 (ALT 250 FOR IP): Performed by: INTERNAL MEDICINE

## 2018-09-25 PROCEDURE — 36569 INSJ PICC 5 YR+ W/O IMAGING: CPT

## 2018-09-25 PROCEDURE — 82947 ASSAY GLUCOSE BLOOD QUANT: CPT

## 2018-09-25 PROCEDURE — 2580000003 HC RX 258: Performed by: PHYSICIAN ASSISTANT

## 2018-09-25 PROCEDURE — 02HV33Z INSERTION OF INFUSION DEVICE INTO SUPERIOR VENA CAVA, PERCUTANEOUS APPROACH: ICD-10-PCS | Performed by: INTERNAL MEDICINE

## 2018-09-25 PROCEDURE — G0009 ADMIN PNEUMOCOCCAL VACCINE: HCPCS | Performed by: INTERNAL MEDICINE

## 2018-09-25 PROCEDURE — 90670 PCV13 VACCINE IM: CPT | Performed by: INTERNAL MEDICINE

## 2018-09-25 PROCEDURE — 80053 COMPREHEN METABOLIC PANEL: CPT

## 2018-09-25 PROCEDURE — 6360000002 HC RX W HCPCS: Performed by: INTERNAL MEDICINE

## 2018-09-25 PROCEDURE — 2580000003 HC RX 258: Performed by: INTERNAL MEDICINE

## 2018-09-25 PROCEDURE — 71045 X-RAY EXAM CHEST 1 VIEW: CPT

## 2018-09-25 PROCEDURE — 2500000003 HC RX 250 WO HCPCS: Performed by: PHYSICIAN ASSISTANT

## 2018-09-25 PROCEDURE — 97116 GAIT TRAINING THERAPY: CPT

## 2018-09-25 PROCEDURE — G0008 ADMIN INFLUENZA VIRUS VAC: HCPCS | Performed by: INTERNAL MEDICINE

## 2018-09-25 PROCEDURE — 85025 COMPLETE CBC W/AUTO DIFF WBC: CPT

## 2018-09-25 PROCEDURE — 36415 COLL VENOUS BLD VENIPUNCTURE: CPT

## 2018-09-25 PROCEDURE — 90686 IIV4 VACC NO PRSV 0.5 ML IM: CPT | Performed by: INTERNAL MEDICINE

## 2018-09-25 PROCEDURE — 6370000000 HC RX 637 (ALT 250 FOR IP): Performed by: NURSE PRACTITIONER

## 2018-09-25 PROCEDURE — 97110 THERAPEUTIC EXERCISES: CPT

## 2018-09-25 RX ORDER — HYDROCODONE BITARTRATE AND ACETAMINOPHEN 5; 325 MG/1; MG/1
1 TABLET ORAL EVERY 6 HOURS PRN
Qty: 20 TABLET | Refills: 0 | Status: SHIPPED | OUTPATIENT
Start: 2018-09-25 | End: 2018-09-30

## 2018-09-25 RX ORDER — POLYETHYLENE GLYCOL 3350 17 G/17G
17 POWDER, FOR SOLUTION ORAL DAILY
Qty: 30 EACH | Refills: 0 | COMMUNITY
Start: 2018-09-26 | End: 2018-10-26

## 2018-09-25 RX ORDER — LORAZEPAM 0.5 MG/1
0.5 TABLET ORAL 2 TIMES DAILY PRN
Qty: 10 TABLET | Refills: 0 | Status: SHIPPED | OUTPATIENT
Start: 2018-09-25 | End: 2018-10-08 | Stop reason: ALTCHOICE

## 2018-09-25 RX ORDER — DIPHENHYDRAMINE HCL 25 MG
25 CAPSULE ORAL EVERY 6 HOURS PRN
Qty: 50 CAPSULE | Refills: 0 | COMMUNITY
Start: 2018-09-25 | End: 2018-10-05

## 2018-09-25 RX ORDER — FUROSEMIDE 10 MG/ML
40 INJECTION INTRAMUSCULAR; INTRAVENOUS ONCE
Status: COMPLETED | OUTPATIENT
Start: 2018-09-25 | End: 2018-09-25

## 2018-09-25 RX ORDER — ACETAMINOPHEN 500 MG
1000 TABLET ORAL EVERY 6 HOURS PRN
Qty: 120 TABLET | Refills: 0 | COMMUNITY
Start: 2018-09-25 | End: 2022-05-17

## 2018-09-25 RX ADMIN — MEROPENEM 1 G: 1 INJECTION, POWDER, FOR SOLUTION INTRAVENOUS at 13:41

## 2018-09-25 RX ADMIN — Medication 10 ML: at 09:09

## 2018-09-25 RX ADMIN — LIDOCAINE HYDROCHLORIDE 5 ML: 10 INJECTION, SOLUTION INFILTRATION; PERINEURAL at 09:30

## 2018-09-25 RX ADMIN — POLYETHYLENE GLYCOL 3350 17 G: 17 POWDER, FOR SOLUTION ORAL at 09:00

## 2018-09-25 RX ADMIN — INSULIN LISPRO 6 UNITS: 100 INJECTION, SOLUTION INTRAVENOUS; SUBCUTANEOUS at 11:54

## 2018-09-25 RX ADMIN — ENALAPRIL MALEATE 2.5 MG: 2.5 TABLET ORAL at 09:01

## 2018-09-25 RX ADMIN — INFLUENZA A VIRUS A/MICHIGAN/45/2015 X-275 (H1N1) ANTIGEN (FORMALDEHYDE INACTIVATED), INFLUENZA A VIRUS A/SINGAPORE/INFIMH-16-0019/2016 IVR-186 (H3N2) ANTIGEN (FORMALDEHYDE INACTIVATED), INFLUENZA B VIRUS B/PHUKET/3073/2013 ANTIGEN (FORMALDEHYDE INACTIVATED), AND INFLUENZA B VIRUS B/MARYLAND/15/2016 BX-69A ANTIGEN (FORMALDEHYDE INACTIVATED) 0.5 ML: 15; 15; 15; 15 INJECTION, SUSPENSION INTRAMUSCULAR at 16:12

## 2018-09-25 RX ADMIN — ENOXAPARIN SODIUM 40 MG: 40 INJECTION SUBCUTANEOUS at 09:01

## 2018-09-25 RX ADMIN — INSULIN LISPRO 3 UNITS: 100 INJECTION, SOLUTION INTRAVENOUS; SUBCUTANEOUS at 09:00

## 2018-09-25 RX ADMIN — HYDROCODONE BITARTRATE AND ACETAMINOPHEN 1 TABLET: 5; 325 TABLET ORAL at 08:59

## 2018-09-25 RX ADMIN — ATORVASTATIN CALCIUM 10 MG: 10 TABLET, FILM COATED ORAL at 09:01

## 2018-09-25 RX ADMIN — PNEUMOCOCCAL 13-VALENT CONJUGATE VACCINE 0.5 ML: 2.2; 2.2; 2.2; 2.2; 2.2; 4.4; 2.2; 2.2; 2.2; 2.2; 2.2; 2.2; 2.2 INJECTION, SUSPENSION INTRAMUSCULAR at 16:10

## 2018-09-25 RX ADMIN — MEROPENEM 1 G: 1 INJECTION, POWDER, FOR SOLUTION INTRAVENOUS at 05:16

## 2018-09-25 RX ADMIN — Medication 10 ML: at 12:35

## 2018-09-25 RX ADMIN — DILTIAZEM HYDROCHLORIDE 360 MG: 180 CAPSULE, COATED, EXTENDED RELEASE ORAL at 09:00

## 2018-09-25 RX ADMIN — PANTOPRAZOLE SODIUM 40 MG: 40 TABLET, DELAYED RELEASE ORAL at 09:01

## 2018-09-25 RX ADMIN — FUROSEMIDE 40 MG: 10 INJECTION, SOLUTION INTRAMUSCULAR; INTRAVENOUS at 12:34

## 2018-09-25 RX ADMIN — HYDROCODONE BITARTRATE AND ACETAMINOPHEN 1 TABLET: 5; 325 TABLET ORAL at 15:36

## 2018-09-25 ASSESSMENT — PAIN DESCRIPTION - PAIN TYPE: TYPE: ACUTE PAIN

## 2018-09-25 ASSESSMENT — PAIN DESCRIPTION - ORIENTATION: ORIENTATION: RIGHT;UPPER

## 2018-09-25 ASSESSMENT — PAIN SCALES - GENERAL
PAINLEVEL_OUTOF10: 8
PAINLEVEL_OUTOF10: 6
PAINLEVEL_OUTOF10: 7

## 2018-09-25 ASSESSMENT — PAIN DESCRIPTION - LOCATION: LOCATION: ABDOMEN

## 2018-09-25 NOTE — DISCHARGE SUMMARY
Leti Groves. Nika Gama  Physician Assistant Attestation Note For Discharge 9/25/18    I personally evaluated and examined the patient face-to-face in conjunction with the PA and agree with the management and dispostition of the patient. Please see PA's discharge summary note for full details. My key findings are:     SUBJECTIVE:    Patient admitted for Sepsis [facility]). See PA's note for details    OBJECTIVE:    Vitals:   Temp: 99.1 °F (37.3 °C)  BP: (!) 145/74  Resp: 16  Pulse: 64  SpO2: 95 %    24HR INTAKE/OUTPUT:    Intake/Output Summary (Last 24 hours) at 09/25/18 1217  Last data filed at 09/25/18 1033   Gross per 24 hour   Intake             2060 ml   Output              900 ml   Net             1160 ml     -----------------------------------------------------------------  Exam:  GEN:    Awake, alert and oriented x 3. no acute distress  EYES:  EOMI, pupils equal   NECK: Supple. No lymphadenopathy. No carotid bruit  CVS:    RRR, no murmur, rub or gallop  PULM: CTA, no wheezes, rales or rhonchi  ABD:    Bowels sounds normal.  Abdomen is soft. No distention. No tenderness. EXT:   no edema bilaterally . No calf tenderness. NEURO: Motor and sensory are intact  SKIN:  No rashes. No skin lesions.       Diagnostic Data:    · All available data reviewed  Lab Results   Component Value Date    WBC 7.7 09/25/2018    HGB 11.9 09/25/2018    MCV 95.3 09/25/2018     09/25/2018    Lab Results   Component Value Date    GLUCOSE 164 (H) 09/25/2018    BUN 14 09/25/2018    CREATININE 0.61 09/25/2018     09/25/2018    K 4.0 09/25/2018    CALCIUM 9.3 09/25/2018    CL 99 09/25/2018    CO2 34 (H) 09/25/2018       ASSESSMENT:    · Sepsis, urinary source due to ESBL E. coli    PLAN:  · I agree with the plan as outlined in the PA's note  ·     Keith Heck M.D.  9/25/2018  12:17 PM

## 2018-09-25 NOTE — PROCEDURES
PICC Procedure Note    Name: Cyrus Marlow   : 1960   Age: 63 y.o. Allergies: Bee venom; Sulfa antibiotics; and Macrobid [nitrofurantoin macrocrystal]  Diagnosis:  Present on Admission:   Sepsis ([facility] [address]   UTI due to extended-spectrum beta lactamase (ESBL) producing Escherichia coli   Other constipation   DM (diabetes mellitus), type 1 (HCC)   Chronic cholecystitis   Shortness of breath   Abnormal resting ECG findings   Mild aortic stenosis by prior echocardiogram   Bacteremia due to Escherichia coli    History:     Past Medical History:   Diagnosis Date    Diabetes mellitus ([facility] [address]     H/O cardiovascular stress test 2017    Largerly normal moyocardial perfusion imaging with a soft tissue artifact, but without evidence of significant myocardial ischemia or infarction. EF 66%. The pt Flores treadmill score is 4 which correlates with a low/intermiediate risk for CAD.  History of Holter monitoring 2018    Very frequent PVC's. No symptoms were reported. Very frequent premature ventricular ectopic beats total 5610 consisting of 5610 isolated PVC's.  History of Holter monitoring 02/15/2018    Very frequent PVC's. No symptoms were reported. Consider additional testing and/or treatment if clinically indicated.  PONV (postoperative nausea and vomiting)        Procedure Start: 09  Procedure Stop:     Indication for Insertion: Antibiotic Therapy    Labs:   Lab Results   Component Value Date/Time    WBC 7.7 2018 05:54 AM       Time Out/Procedure Checklist:  [x] PICC Order  [x] Double Identification  [x] Consent Obtained  [x] Verified correct procedure against physician order  [x] Education provided/questions answered  [x] \"Time Out\" performed    Prep Solution:    Chloraprep    Local Anesthetic:  2 mL of 1% Lidocaine    Lumen:  5 fr.  Double Lumen [facility]    Lot Number: [id number]  Expiration Date: 10-    Arm Circumference: 31 cm    Insertion Measurement: 39

## 2018-09-25 NOTE — DISCHARGE SUMMARY
cardiology. Troponins were negative. CTA chest: \"No evidence of pulmonary embolism. Bilateral small to moderate pleural effusions with adjacent atelectasis. \" She was diuresed, as she did receive significant amount of fluid resuscitation earlier in her stay. Her chest discomfort was felt to be due to demand ischemia secondary to her sepsis. Her SOB improved and Lasix was stopped. She had picc line placed. She will require a full 10 day course of meropenem. She clinically improved. Ambulated, tolerated diet and had BMs prior to discharge, She will resume her home medications and insulin dosing. PATIENT HAD ESBL E COLI, INITIAL ABX DID NOT COVER ESBL, IF PATIENT ADMITTED WITH UTI AGAIN PLEASE CONSIDER ESBL. 9/24/18 ECHO  Summary  Global left ventricular systolic function appears hyperdynamic with an  estimated ejection fraction of >70%. The left ventricular cavity size is within normal limits and the left  ventricular wall thickness is within normal limits. No definite specific wall motion abnormalities were identified. The left atrium is mildly dilated (29-33) with a left atrial volume index of  1 ml/m2. The mean aortic valve gradient is 10.4 mmHg consistent with mild aortic  stenosis. Normal mitral valve structure with mild mitral regurgitation. Normal tricuspid valve structure with mild tricuspid regurgitation. Borderline to mild pulmonary hypertension with an estimated right  ventricular systolic pressure of 30 mmHg. An anterior echo free space is seen suggestive of a small effusion or fat  pad. Compared to the previous study of 10/24/17, no significant change was seen.     9/20/2018 11:49 PM - Jw, Minal Incoming Lab Results From [employer]      Component Results      Component Collected Lab   Specimen Description 09/19/2018  1:53 AM [facility] Lab   . BLOOD    Special Requests 09/19/2018  1:53 AM [facility] Lab   l ac 20 ml    Culture  (Abnormal) 09/19/2018  1:53 AM [facility] Chelsie 176 BLOOD CULTURE, RN NOTIFIED: GARTH ([facility] ICU). 9/19/18  6536. KEB. RB. Culture 09/19/2018  1:53 AM [place]   DIRECT GRAM STAIN FROM BOTTLE: GRAM NEGATIVE RODS    Culture  (Abnormal) 09/19/2018  1:53 AM [address] THIS ORGANISM IS AN EXTENDED-SPECTRUM BETA-LACTAMASE      Culture  (Abnormal) 09/19/2018  1:53 AM [place]    AND RESISTANCE TO THERAPY WITH PENICILLINS, CEPHALOSPORINS AND AZTREONAM IS     Culture  (Abnormal) 09/19/2018  1:53 AM [address] [place] [address] SUSCEPTIBLE TO CARBAPENEMS. Culture  (Abnormal) 09/19/2018  1:53 AM [address] ID CONSULTATION.      Status 09/19/2018  1:53 AM [employer]   FINAL 09/20/2018    Organism 09/19/2018  1:53 AM [place]   EC    Testing Performed By      Lab - Abbreviation Name Director Address Valid Date Range   236-- [address] [facility] Lauryn Demarco  [place].  [facility] [address] 08/30/17 1202-Present   [address]-Kayla [place] [address] [facility] [address] 08/30/17 1201-Present   Culture & Susceptibility      ESCHERICHIA COLI      Antibiotic Interpretation JEAN Status   Confirmatory Extended Spectrum Beta-Lactamase Resistant POSITIVE Final   amikacin Sensitive 4 SUSCEPTIBLE Final   ampicillin Resistant >=32 RESISTANT Final   ampicillin-sulbactam   NOT REPORTED Final   aztreonam Resistant >=64 RESISTANT Final   ceFAZolin Resistant >=64 RESISTANT Final   cefTRIAXone Resistant >=64 RESISTANT Final   cefepime   NOT REPORTED Final   ciprofloxacin Resistant >=4 RESISTANT Final   ertapenem   NOT REPORTED Final   gentamicin Resistant >=16 RESISTANT Final   meropenem Sensitive <=0.25 SUSCEPTIBLE Final   nitrofurantoin   NOT REPORTED Final piperacillin-tazobactam Resistant 8 RESISTANT Final   tigecycline   NOT REPORTED Final   tobramycin Intermediate 8 INTERMEDIATE Final   trimethoprim-sulfamethoxazole Resistant >=320 RESISTANT Final                9/20/2018 11:50 PM - Minal Escalera Incoming Lab Results From [employer]      Component Results      Component Collected Lab   Specimen Description 09/19/2018  2:50 AM [facility] Lab   . URINE    Special Requests 09/19/2018  2:50 AM [facility] Lab   UNSPECIFIED    Culture  (Abnormal) 09/19/2018  2:50 AM [address] >[id number] CFU/ML THIS ORGANISM IS AN EXTENDED-SPECTRUM     Culture  (Abnormal) 09/19/2018  2:50  [address]    BETA-LACTAMASE  AND RESISTANCE TO THERAPY WITH PENICILLINS,     Culture  (Abnormal) 09/19/2018  2:50 AM Jerry Rowland 92 AZTREONAM IS EXPECTED. [address] [facility]     Culture  (Abnormal) 09/19/2018  2:50 AM [address].      Status 09/19/2018  2:50  [address]   FINAL 09/20/2018    Organism 09/19/2018  2:50  [address]   EC    Testing Performed By      Lab - Abbreviation Name Director Address Valid Date Range   208-- 5298 [facility] LAB [place]  [facility] [address] 08/30/17 1202-Present   208-Kayla Hooker [place] [address] [facility] [address] 08/30/17 1201-Present   Culture & Susceptibility      ESCHERICHIA COLI      Antibiotic Interpretation JEAN Status   Confirmatory Extended Spectrum Beta-Lactamase Resistant POSITIVE Final   amikacin Sensitive 4 SUSCEPTIBLE Final   ampicillin Resistant >=32 RESISTANT Final   ampicillin-sulbactam   NOT REPORTED Final   aztreonam Resistant >=64 RESISTANT Final   ceFAZolin Resistant >=64 RESISTANT  Cefazolin sensitivity CRYSTUA NOT REPORTED 09/19/2018    BACTERIA 1+ (A) 09/19/2018    YEAST NOT REPORTED 09/19/2018       Ct Abdomen Pelvis Wo Contrast Additional Contrast? None    Result Date: 9/21/2018  EXAMINATION: CT OF THE ABDOMEN AND PELVIS WITHOUT CONTRAST 9/19/2018 3:42 am TECHNIQUE: CT of the abdomen and pelvis was performed without the administration of intravenous contrast. Multiplanar reformatted images are provided for review. Dose modulation, iterative reconstruction, and/or weight based adjustment of the mA/kV was utilized to reduce the radiation dose to as low as reasonably achievable. COMPARISON: None. HISTORY: ORDERING SYSTEM PROVIDED HISTORY: PYELONEPHRITIS, COMPLICATED (DM, IMMUNODIFF, HX OF STONES, PRIOR SURGERY, NOT RESPONDING TO ABX) TECHNOLOGIST PROVIDED HISTORY: FINDINGS: Lower Chest:  Visualized portion of the lower chest demonstrates no acute abnormality. Organs: No hydronephrosis or nephrolithiasis. Mild right kidney pelvocaliectasis with urothelial hyperdensity. No obstructive uropathy identified. Trace right greater left perinephric stranding. The adrenal glands, liver, gallbladder, spleen and pancreas are within normal limits for noncontrast examination. GI/Bowel: Large bowel with moderate to large volume of fecal material.  The appendix is not visualized but no pericecal inflammatory changes are noted. Pelvis: The urinary bladder is unremarkable. The uterus is in situ. No significant free fluid. Peritoneum/Retroperitoneum: The abdominal aorta is without aneurysmal dilatation. No lymphadenopathy, free fluid or free air. Bones/Soft Tissues: There are degenerative changes of the lumbar spine but no suspicious osseous lesion. Noncontrast examination is without convincing evidence of acute abdominopelvic abnormality. However, right kidney mild pelvicaliectasis with questionable urothelial hyperdensity may be physiologic or sequela of urinary tract infection, in the appropriate clinical setting. Xr Chest Standard (2 Vw)    Result Date: 9/19/2018  EXAMINATION: TWO VIEWS OF THE CHEST 9/19/2018 3:40 am COMPARISON: October 23, 2017 HISTORY: ORDERING SYSTEM PROVIDED HISTORY: sepsis TECHNOLOGIST PROVIDED HISTORY: sepsis FINDINGS: The mediastinal and cardiac contours are normal. No lobar lung consolidation, pleural effusion or pneumothorax. The bones are stable. .     No radiographic evidence of acute cardiopulmonary disease. Ct Abdomen W Contrast Additional Contrast? None    Result Date: 9/20/2018  EXAMINATION: CT ABDOMEN AND PELVIS WITH CONTRAST 9/20/2018 8:38 am TECHNIQUE: CT of the abdomen was performed with the administration of intravenous contrast. Multiplanar reformatted images are provided for review. Dose modulation, iterative reconstruction, and/or weight based adjustment of the mA/kV was utilized to reduce the radiation dose to as low as reasonably achievable. COMPARISON: 09/19/2018 HISTORY: ORDERING SYSTEM PROVIDED HISTORY: RUQ PAIN, FEVER, ELEV WBC, MURPHYS SIGN TECHNOLOGIST PROVIDED HISTORY: FINDINGS: Lower Chest: Interlobular septal thickening and trace bilateral pleural effusions with subtle ground-glass opacities within the lung bases bilaterally. Organs: The liver, pancreas, and spleen is grossly unremarkable. There is mild prominence of the gallbladder wall and trace pericholecystic fluid. No obvious cholelithiasis or intrahepatic biliary duct dilatation. GI/Bowel: Bowel is without evidence for obstruction or inflammation. No free intraperitoneal air. There is nonspecific mesenteric edema within the right hemiabdomen. Additionally, there is thickening of the gastric antrum and pylorus. .  Small bowel loops are normal in caliber. No definite hiatal hernia. Pelvis: Trace pelvic free fluid. Peritoneum/Retroperitoneum: The adrenal glands are normal in size and configuration bilaterally. Kidneys perfuse and excrete in a symmetric fashion and ureters are not obstructed.   Urinary bladder acalculous cholecystitis. Nm Hepatobiliary Scan W Ejection Fraction    Result Date: 9/20/2018  EXAMINATION: NUCLEAR MEDICINE HEPATOBILIARY SCINTIGRAPHY (HIDA SCAN). 9/20/2018 11:16 am TECHNIQUE: Approximately 5.4 [id number] Mebrofenin (Choletec) was administered IV. Then, dynamic images of the abdomen were obtained in the anterior projection for 60 mins. A right lateral view was also obtained at 60 mins. At 65 minutes and upon visualization of the gallbladder, 1.53 mcg of Kinevac was administered intravenously to evaluate gallbladder ejection fraction. COMPARISON: Ultrasound of the gallbladder of 09/20/2018. HISTORY: ORDERING SYSTEM PROVIDED HISTORY: sepsis need to rule out cystic duct obstruction FINDINGS: Prompt, homogenous uptake by the liver is noted with normal appearance of radiotracer excretion into the biliary system. Clearance of bloodpool activity appears appropriate. Gallbladder and small bowel is visualized in appropriate sequence. The gallbladder ejection fraction was calculated at 51%, normal being greater than 38% by 60 minutes. No convincing scintigraphic evidence of cystic duct obstruction to suggest acute cholecystitis. Normal gallbladder ejection fraction. Discharge Diagnoses:    Principal Problem:    Sepsis ([address]  Active Problems:    Shortness of breath    Abnormal resting ECG findings    Mild aortic stenosis by prior echocardiogram    DM (diabetes mellitus), type 1 ([address]    UTI due to extended-spectrum beta lactamase (ESBL) producing Escherichia coli    Other constipation    Chronic cholecystitis    S/P cholecystectomy    Bacteremia due to Escherichia coli  Resolved Problems:    * No resolved hospital problems.  *      Active Hospital Problems    Diagnosis Date Noted    Shortness of breath [R06.02]      Priority: High    Abnormal resting ECG findings [R94.31]      Priority: High    Mild aortic stenosis by prior echocardiogram [I35.0]      Priority: High    Bacteremia due to Escherichia coli [R78.81] 09/25/2018    UTI due to extended-spectrum beta lactamase (ESBL) producing Escherichia coli [N39.0, B96.29, Z16.12] 09/21/2018    Other constipation [K59.09] 09/21/2018    Chronic cholecystitis [K81.1] 09/21/2018    S/P cholecystectomy [Z90.49] 09/21/2018    Sepsis ([facility] [A41.9] 09/19/2018    DM (diabetes mellitus), type 1 ([facility] [E10.9] 05/08/2018       Discharge Medications:       Sofya Issa   Home Medication Instructions ALY:[id number]    Printed on:09/25/18 1208   Medication Information                      acetaminophen (TYLENOL) 500 MG tablet  Take 2 tablets by mouth every 6 hours as needed for Pain or Fever             ADDERALL XR 30 MG capsule  1 tab at 6 am, 1 tab at noon  MAURY. Earliest Fill Date: 9/5/18             atorvastatin (LIPITOR) 10 MG tablet  Take 1 tablet by mouth daily             cyclobenzaprine (FLEXERIL) 10 MG tablet  Take 1 tablet by mouth 3 times daily as needed for Muscle spasms             diltiazem (CARDIZEM CD) 360 MG extended release capsule  Take 1 capsule by mouth daily             diphenhydrAMINE (BENADRYL) 25 MG capsule  Take 1 capsule by mouth every 6 hours as needed for Itching or Allergies             enalapril (VASOTEC) 2.5 MG tablet  Take 1 tablet by mouth daily             fluocinonide (LIDEX) 0.05 % cream  Apply topically 2 times daily. HYDROcodone-acetaminophen (NORCO) 5-325 MG per tablet  Take 1 tablet by mouth every 6 hours as needed for Pain for up to 5 days. .             insulin aspart (NOVOLOG) 100 UNIT/ML injection vial  100 units qd per pump             LORazepam (ATIVAN) 0.5 MG tablet  Take 1 tablet by mouth 2 times daily as needed for Anxiety for up to 30 days. .             meropenem (MERREM) infusion  Infuse 1,000 mg intravenously every 8 hours for 7 days Compound per protocol. meropenem (MERREM) infusion  Infuse 1,000 mg intravenously every 8 hours for 6 days Compound per protocol. polyethylene glycol (GLYCOLAX) packet  Take 17 g by mouth daily                 Patient Instructions: Activity: activity as tolerated no heavy lifting  Diet: low fiber  Wound Care: as directed  Other: IV abx, picc line care     Disposition:   Discharge to Home with home health    Follow up:  Patient will be followed by Partha Simeon MD in 1-2 weeks  Dr. Aleksandra Wright 1 week    CORE MEASURES on Discharge (if applicable)  ACE/ARB in CHF: NA  Statin in MI: NA  ASA in [place]: NA  Statin in CVA: NA  Antiplatelet in CVA: NA    Total time spent on discharge services: 45 minutes    PATIENT HAD ESBL E COLI, INITIAL ABX DID NOT COVER ESBL, IF PATIENT ADMITTED WITH UTI AGAIN PLEASE CONSIDER ESBL.      Including the following activities:  Evaluation and Management of patient  Discussion with patient and/or surrogate about current care plan  Coordination with Case Management and/or   Coordination of care with Consultants (if applicable)   Coordination of care with Receiving Facility Physician (if applicable)  Completion of DME forms (if applicable)  Preparation of Discharge Summary  Preparation of Medication Reconciliation  Preparation of Discharge Prescriptions    Signed:  Cristal Dior PA-C  9/25/2018, 12:03 PM

## 2018-09-25 NOTE — PROGRESS NOTES
[facility].   MARIANNE Nolan

## 2018-09-25 NOTE — PROGRESS NOTES
[address]    AND RESISTANCE TO THERAPY WITH PENICILLINS, CEPHALOSPORINS AND AZTREONAM IS     Culture  (Abnormal) 09/19/2018  1:53 AM [address] SUSCEPTIBLE TO CARBAPENEMS. Culture  (Abnormal) 09/19/2018  1:53 AM [address] ID CONSULTATION. Status 09/19/2018  1:53  [address]   FINAL 09/20/2018    Organism 09/19/2018  1:53  [address]   EC    Testing Performed By     Hal Taylor Name Director Address Valid Date Range   [address] [facility] LAB Bj Kuhn  [place].  [facility] [address] 08/30/17 1202-Present   208-Kayla Ma-Michael Hooker  [address] [address] 08/30/17 1201-Present   Culture & Susceptibility     ESCHERICHIA COLI     Antibiotic Interpretation JEAN Status   Confirmatory Extended Spectrum Beta-Lactamase Resistant POSITIVE Final   amikacin Sensitive 4 SUSCEPTIBLE Final   ampicillin Resistant >=32 RESISTANT Final   ampicillin-sulbactam  NOT REPORTED Final   aztreonam Resistant >=64 RESISTANT Final   ceFAZolin Resistant >=64 RESISTANT Final   cefTRIAXone Resistant >=64 RESISTANT Final   cefepime  NOT REPORTED Final   ciprofloxacin Resistant >=4 RESISTANT Final   ertapenem  NOT REPORTED Final   gentamicin Resistant >=16 RESISTANT Final   meropenem Sensitive <=0.25 SUSCEPTIBLE Final   nitrofurantoin  NOT REPORTED Final   piperacillin-tazobactam Resistant 8 RESISTANT Final   tigecycline  NOT REPORTED Final   tobramycin Intermediate 8 INTERMEDIATE Final   trimethoprim-sulfamethoxazole Resistant >=320 RESISTANT Final             9/20/2018 11:50 PM - Minal Escalera Incoming Lab Results From [employer]     Component Results     Component Collected Lab   Specimen Description 09/19/2018  2:50 AM - [facility] Lab   . URINE    Special Requests 09/19/2018  2:50 AM - UTI/bactermia   Continue meropenem total 10 days (started 9/21/18)   picc line placement today    Shortness of breath/Abnormal resting ECG findings     Appreciate cardiology   ECG changes resolved - likely were due to demand ischemia secondary to sepsis   No further diuretics on discharge    Mild aortic stenosis by prior echocardiogram    DM (diabetes mellitus), type 1   Will resume home meds/ regimen on discharge    Other constipation    Chronic cholecystitis     S/P cholecystectomy   Appreciate general surgery  Resolved Problems:    * No resolved hospital problems.  *    [address] AFTER PICC LINE PLACED    · High risk medication: none    CHRISTELLE Slaughter PA-C  9/25/2018, 10:27 AM

## 2018-09-27 PROBLEM — Z12.11 COLON CANCER SCREENING: Status: RESOLVED | Noted: 2017-01-03 | Resolved: 2018-09-27

## 2018-10-03 ENCOUNTER — HOSPITAL ENCOUNTER (OUTPATIENT)
Dept: LAB | Age: 58
Discharge: HOME | End: 2018-10-03
Payer: COMMERCIAL

## 2018-10-03 ENCOUNTER — OFFICE VISIT (OUTPATIENT)
Dept: SURGERY | Age: 58
End: 2018-10-03

## 2018-10-03 VITALS
SYSTOLIC BLOOD PRESSURE: 124 MMHG | BODY MASS INDEX: 25.37 KG/M2 | RESPIRATION RATE: 18 BRPM | HEIGHT: 65 IN | WEIGHT: 152.3 LBS | DIASTOLIC BLOOD PRESSURE: 73 MMHG | TEMPERATURE: 98 F | HEART RATE: 75 BPM

## 2018-10-03 DIAGNOSIS — R31.9 URINARY TRACT INFECTION WITH HEMATURIA, SITE UNSPECIFIED: ICD-10-CM

## 2018-10-03 DIAGNOSIS — Z09 POSTOP CHECK: Primary | ICD-10-CM

## 2018-10-03 DIAGNOSIS — N39.0 URINARY TRACT INFECTION WITH HEMATURIA, SITE UNSPECIFIED: ICD-10-CM

## 2018-10-03 PROCEDURE — 87086 URINE CULTURE/COLONY COUNT: CPT

## 2018-10-03 PROCEDURE — 99024 POSTOP FOLLOW-UP VISIT: CPT | Performed by: SURGERY

## 2018-10-04 LAB
CULTURE: NO GROWTH
Lab: NORMAL
SPECIMEN DESCRIPTION: NORMAL
STATUS: NORMAL

## 2018-10-08 ENCOUNTER — OFFICE VISIT (OUTPATIENT)
Dept: UROLOGY | Age: 58
End: 2018-10-08
Payer: COMMERCIAL

## 2018-10-08 VITALS
SYSTOLIC BLOOD PRESSURE: 134 MMHG | BODY MASS INDEX: 25.13 KG/M2 | WEIGHT: 151 LBS | TEMPERATURE: 98.5 F | DIASTOLIC BLOOD PRESSURE: 64 MMHG

## 2018-10-08 DIAGNOSIS — N39.0 FREQUENT UTI: Primary | ICD-10-CM

## 2018-10-08 PROCEDURE — 99214 OFFICE O/P EST MOD 30 MIN: CPT | Performed by: UROLOGY

## 2018-10-08 RX ORDER — ESTRADIOL 0.1 MG/G
1 CREAM VAGINAL DAILY
Qty: 1 TUBE | Refills: 3 | Status: SHIPPED | OUTPATIENT
Start: 2018-10-08 | End: 2020-07-13

## 2018-10-08 ASSESSMENT — ENCOUNTER SYMPTOMS
NAUSEA: 0
EYE PAIN: 0
VOMITING: 0
WHEEZING: 0
BACK PAIN: 0
COLOR CHANGE: 0
ABDOMINAL PAIN: 0
EYE REDNESS: 0
SHORTNESS OF BREATH: 0
COUGH: 0

## 2018-10-08 NOTE — PROGRESS NOTES
Subjective:      Patient ID: Nicole Dash is a 63 y.o. female. HPI  Patient is a 63 y.o. male in no acute distress and alert and oriented to person, place and time. History  Patient was admitted early yesterday morning. Patient did have right flank pain. Patient is CT scan upon admitting to the hospital.  This was a CT abdomen pelvis without contrast.  This did show mild pelviectasis on the right. She had no definitive calculi. Patient is such subsequently have urine culture and blood culture studies showing gram-negative rods. Despite being on broad-spectrum antibiotics patient continued to have elevated white blood cell count. We did order CT abdomen pelvis with IV contrast this morning. This again showed no significant GI abnormalities. Patient did have abnormalities in the gallbladder that were followed up with HIDA scan. Patient did subsequently undergo cholecystectomy. Pt dos have issues with recurrent UTI. She is a type 1 diabetic maintained on an insulin pump. Currently  Patient is here today for frequent urinary tract infections. Patient was recently in the hospital with an ESBL E. coli infection. Patient has had a repeat urine culture. This was negative for growth. This was done on October 3, 2018. Patient does feel much better. Her right flank pain has resolved. Patient does report getting 3-4 urinary tract infections per year. Patient did go through menopause approximately 10 years ago. She does claim that the urinary tract infections did start after the menopause. No pain today. Patient reports no urinary incontinence. Past Medical History:   Diagnosis Date    Diabetes mellitus ([facility]     H/O cardiovascular stress test 11/14/2017    Largerly normal moyocardial perfusion imaging with a soft tissue artifact, but without evidence of significant myocardial ischemia or infarction. EF 66%.  The pt Flores treadmill score is 4 which correlates with a low/intermiediate risk for

## 2018-10-19 ENCOUNTER — OFFICE VISIT (OUTPATIENT)
Dept: CARDIOLOGY | Age: 58
End: 2018-10-19
Payer: COMMERCIAL

## 2018-10-19 VITALS
DIASTOLIC BLOOD PRESSURE: 81 MMHG | HEART RATE: 80 BPM | SYSTOLIC BLOOD PRESSURE: 140 MMHG | WEIGHT: 156.2 LBS | BODY MASS INDEX: 26.02 KG/M2 | HEIGHT: 65 IN

## 2018-10-19 DIAGNOSIS — I49.3 FREQUENT PVCS: ICD-10-CM

## 2018-10-19 DIAGNOSIS — I35.0 MILD AORTIC STENOSIS: ICD-10-CM

## 2018-10-19 DIAGNOSIS — R94.39 ABNORMAL STRESS TEST: Primary | ICD-10-CM

## 2018-10-19 DIAGNOSIS — I47.29 NSVT (NONSUSTAINED VENTRICULAR TACHYCARDIA): ICD-10-CM

## 2018-10-19 DIAGNOSIS — I10 ESSENTIAL HYPERTENSION: ICD-10-CM

## 2018-10-19 PROCEDURE — 99215 OFFICE O/P EST HI 40 MIN: CPT | Performed by: FAMILY MEDICINE

## 2018-10-19 NOTE — PROGRESS NOTES
medications or any other concerns at this time. Past Medical History:   Diagnosis Date    Diabetes mellitus ([address]     H/O cardiovascular stress test 11/14/2017    Largerly normal moyocardial perfusion imaging with a soft tissue artifact, but without evidence of significant myocardial ischemia or infarction. EF 66%. The pt Flores treadmill score is 4 which correlates with a low/intermiediate risk for CAD.  History of Holter monitoring 02/16/2018    Very frequent PVC's. No symptoms were reported. Very frequent premature ventricular ectopic beats total 5610 consisting of 5610 isolated PVC's.  History of Holter monitoring 02/15/2018    Very frequent PVC's. No symptoms were reported. Consider additional testing and/or treatment if clinically indicated.  PONV (postoperative nausea and vomiting)        CURRENT ALLERGIES: Bee venom; Sulfa antibiotics; and Macrobid [nitrofurantoin macrocrystal] REVIEW OF SYSTEMS: 14 systems were reviewed. Pertinent positives and negatives as above, all else negative. Past Surgical History:   Procedure Laterality Date    CHOLECYSTECTOMY  09/20/2018    CHOLECYSTECTOMY LAPAROSCOPIC ROBOTIC (N/A )    COLONOSCOPY  01/11/2017    -polyp(adenomatous polyp)    EYE SURGERY Bilateral 2007    DE OFFICE/OUTPT VISIT,PROCEDURE ONLY N/A 9/20/2018    CHOLECYSTECTOMY LAPAROSCOPIC ROBOTIC performed by Gifty Bhatt DO at [address] 2009    TUBAL LIGATION  1993    Social History:  Social History   Substance Use Topics    Smoking status: Former Smoker    Smokeless tobacco: Never Used    Alcohol use Yes      Comment: social        CURRENT MEDICATIONS:  Outpatient Prescriptions Marked as Taking for the 10/19/18 encounter (Office Visit) with Candace Galarza MD   Medication Sig Dispense Refill    Psyllium (METAMUCIL PO) Take by mouth daily      ADDERALL XR 30 MG capsule 1 tab at 6 am, 1 tab at noon  MAURY.  Earliest Fill Date: 10/8/18 60 capsule 0    sounds and aorta are normal. She exhibits no organomegaly, mass or bruit. Extremities: No edema. No cyanosis and no clubbing. Pulses are 2+ radial and carotid pulses. 2+ dorsalis pedis and posterior tibial pulses bilaterally. Neurological: She is alert and oriented to person, place, and time. No evidence of gross cranial nerve deficit. Coordination appeared normal.   Skin: Skin is warm and dry. There is no rash or diaphoresis. Psychiatric: She has a normal mood and affect. Her speech is normal and behavior is normal.      MOST RECENT LABS ON RECORD:   Lab Results   Component Value Date    WBC 7.7 09/25/2018    HGB 11.9 09/25/2018    HCT 36.1 (L) 09/25/2018     09/25/2018    CHOL 193 02/20/2017    TRIG 66 02/20/2017     (A) 02/20/2017    ALT 50 (H) 09/25/2018    AST 19 09/25/2018     09/25/2018    K 4.0 09/25/2018    CL 99 09/25/2018    CREATININE 0.61 09/25/2018    BUN 14 09/25/2018    CO2 34 (H) 09/25/2018    TSH 1.80 10/24/2017    LABA1C 9.1 05/08/2018       ASSESSMENT:  1. Abnormal stress test    2. NSVT (nonsustained ventricular tachycardia) ([address]    3. Frequent PVCs    4. Essential hypertension    5. Mild aortic stenosis         PLAN:  · Abnormal Stress Test:   Because of Ms. Diaz's frequent PVC's with short runs of nonsustained ventricular tachycardia, acute heart failure exacerbation during her recent hospitalization as well her risk factors as well as her at least mildly abnormal stress test, I recommended that Ms. Corinne Sneddon consider undergoing a cardiac catheterization with coronary angiography to assess her coronary anatomy and facilitate better treatment recommendations. I discussed the risks, benefits, and alternatives to the procedure including the risk of bleeding, contrast induced allergy and/or kidney damage, arrythmia, stroke, heart attack, death, or the need for further procedures.  I also discussed the fact that although treatment with simple medical management is a potential holter monitor from 11/2017):   · Calcium Channel Blocker: Continue Cardizem  mg daily    · Hypertension, Borderline controlled: In the past his has been controlled so we will continue to monitor for now. · Calcium Channel Blocker: Continue Cardizem  mg daily. · ACE/ARB: Continue Enalapril 2.5 mg daily. Mild Aortic Stenosis: Asymptomatic-mean gradient of 12.5 mm Hg but without aortic insufficiency. Based on how mild and asymptomatic Ms. Kristine Helms is with this aortic stenosis, I believe I will just continue to monitor this with echocardiogram's every couple of years. In the meantime, I encouraged Ms. Kristine Helms to continue to take her other medications. FOLLOW UP:   I told Ms. Kristine Helms to call my office if she had any problems, but otherwise I asked her to follow up the week after her cardiac catheterization. However, I would be happy to see her sooner should the need arise. Sincerely,  Carie Cristobal. Meg BUENO, MS, F.A.C.C. [facility] Cardiology Specialist    [address] [place] [address]  Phone: [phone], Fax: [phone]     I believe that the risk of significant morbidity and mortality related to the patient's current medical conditions are: high    The documentation recorded by the scribe, accurately and completely reflects the services I personally performed and the decisions made by me. Oumar Weaver MD, MS, F.A.C.C.  October 19, 2018

## 2018-10-24 ENCOUNTER — HOSPITAL ENCOUNTER (OUTPATIENT)
Age: 58
Discharge: HOME | End: 2018-10-24
Payer: COMMERCIAL

## 2018-10-24 DIAGNOSIS — I35.0 MILD AORTIC STENOSIS: ICD-10-CM

## 2018-10-24 DIAGNOSIS — I10 ESSENTIAL HYPERTENSION: ICD-10-CM

## 2018-10-24 DIAGNOSIS — I47.29 NSVT (NONSUSTAINED VENTRICULAR TACHYCARDIA): ICD-10-CM

## 2018-10-24 DIAGNOSIS — I49.3 FREQUENT PVCS: ICD-10-CM

## 2018-10-24 DIAGNOSIS — R94.39 ABNORMAL STRESS TEST: ICD-10-CM

## 2018-10-24 LAB
ANION GAP SERPL CALCULATED.3IONS-SCNC: 9 MMOL/L (ref 9–17)
BUN BLDV-MCNC: 21 MG/DL (ref 6–20)
BUN/CREAT BLD: 29 (ref 9–20)
CALCIUM SERPL-MCNC: 9.4 MG/DL (ref 8.6–10.4)
CHLORIDE BLD-SCNC: 99 MMOL/L (ref 98–107)
CO2: 27 MMOL/L (ref 20–31)
CREAT SERPL-MCNC: 0.72 MG/DL (ref 0.5–0.9)
GFR AFRICAN AMERICAN: >60 ML/MIN
GFR NON-AFRICAN AMERICAN: >60 ML/MIN
GFR SERPL CREATININE-BSD FRML MDRD: ABNORMAL ML/MIN/{1.73_M2}
GFR SERPL CREATININE-BSD FRML MDRD: ABNORMAL ML/MIN/{1.73_M2}
GLUCOSE BLD-MCNC: 305 MG/DL (ref 70–99)
HCT VFR BLD CALC: 41.7 % (ref 36.3–47.1)
HEMOGLOBIN: 13.6 G/DL (ref 11.9–15.1)
MCH RBC QN AUTO: 31.6 PG (ref 25.2–33.5)
MCHC RBC AUTO-ENTMCNC: 32.6 G/DL (ref 28.4–34.8)
MCV RBC AUTO: 97 FL (ref 82.6–102.9)
NRBC AUTOMATED: 0 PER 100 WBC
PDW BLD-RTO: 12.5 % (ref 11.8–14.4)
PLATELET # BLD: 284 K/UL (ref 138–453)
PMV BLD AUTO: 9.1 FL (ref 8.1–13.5)
POTASSIUM SERPL-SCNC: 4.8 MMOL/L (ref 3.7–5.3)
RBC # BLD: 4.3 M/UL (ref 3.95–5.11)
SODIUM BLD-SCNC: 135 MMOL/L (ref 135–144)
WBC # BLD: 7 K/UL (ref 3.5–11.3)

## 2018-10-24 PROCEDURE — 36415 COLL VENOUS BLD VENIPUNCTURE: CPT

## 2018-10-24 PROCEDURE — 80048 BASIC METABOLIC PNL TOTAL CA: CPT

## 2018-10-24 PROCEDURE — 85027 COMPLETE CBC AUTOMATED: CPT

## 2018-10-25 ENCOUNTER — HOSPITAL ENCOUNTER (OUTPATIENT)
Dept: CARDIAC CATH/INVASIVE PROCEDURES | Age: 58
Discharge: HOME | End: 2018-10-25
Payer: COMMERCIAL

## 2018-10-25 VITALS
SYSTOLIC BLOOD PRESSURE: 105 MMHG | WEIGHT: 155 LBS | RESPIRATION RATE: 15 BRPM | BODY MASS INDEX: 25.83 KG/M2 | HEIGHT: 65 IN | HEART RATE: 62 BPM | TEMPERATURE: 97.2 F | DIASTOLIC BLOOD PRESSURE: 66 MMHG | OXYGEN SATURATION: 96 %

## 2018-10-25 PROBLEM — R94.39 ABNORMAL RESULT OF OTHER CARDIOVASCULAR FUNCTION STUDY: Status: ACTIVE | Noted: 2018-10-25

## 2018-10-25 LAB — GLUCOSE BLD-MCNC: 172 MG/DL (ref 74–100)

## 2018-10-25 PROCEDURE — C1887 CATHETER, GUIDING: HCPCS

## 2018-10-25 PROCEDURE — 93458 L HRT ARTERY/VENTRICLE ANGIO: CPT | Performed by: FAMILY MEDICINE

## 2018-10-25 PROCEDURE — C1894 INTRO/SHEATH, NON-LASER: HCPCS

## 2018-10-25 PROCEDURE — 2500000003 HC RX 250 WO HCPCS

## 2018-10-25 PROCEDURE — 6360000002 HC RX W HCPCS

## 2018-10-25 PROCEDURE — C1769 GUIDE WIRE: HCPCS

## 2018-10-25 PROCEDURE — 2580000003 HC RX 258: Performed by: FAMILY MEDICINE

## 2018-10-25 PROCEDURE — 82947 ASSAY GLUCOSE BLOOD QUANT: CPT

## 2018-10-25 PROCEDURE — 2709999900 HC NON-CHARGEABLE SUPPLY

## 2018-10-25 RX ORDER — SODIUM CHLORIDE 0.9 % (FLUSH) 0.9 %
10 SYRINGE (ML) INJECTION PRN
Status: DISCONTINUED | OUTPATIENT
Start: 2018-10-25 | End: 2018-10-26 | Stop reason: HOSPADM

## 2018-10-25 RX ORDER — SODIUM CHLORIDE 0.9 % (FLUSH) 0.9 %
10 SYRINGE (ML) INJECTION EVERY 12 HOURS SCHEDULED
Status: DISCONTINUED | OUTPATIENT
Start: 2018-10-25 | End: 2018-10-26 | Stop reason: HOSPADM

## 2018-10-25 RX ORDER — NITROGLYCERIN 0.4 MG/1
0.4 TABLET SUBLINGUAL EVERY 5 MIN PRN
Status: DISCONTINUED | OUTPATIENT
Start: 2018-10-25 | End: 2018-10-26 | Stop reason: HOSPADM

## 2018-10-25 RX ORDER — SODIUM CHLORIDE 9 MG/ML
INJECTION, SOLUTION INTRAVENOUS CONTINUOUS
Status: DISCONTINUED | OUTPATIENT
Start: 2018-10-25 | End: 2018-10-26 | Stop reason: HOSPADM

## 2018-10-25 RX ORDER — DIPHENHYDRAMINE HCL 25 MG
50 CAPSULE ORAL ONCE
Status: DISCONTINUED | OUTPATIENT
Start: 2018-10-25 | End: 2018-10-26 | Stop reason: HOSPADM

## 2018-10-25 RX ORDER — ACETAMINOPHEN 325 MG/1
650 TABLET ORAL EVERY 4 HOURS PRN
Status: DISCONTINUED | OUTPATIENT
Start: 2018-10-25 | End: 2018-10-26 | Stop reason: HOSPADM

## 2018-10-25 RX ADMIN — SODIUM CHLORIDE: 9 INJECTION, SOLUTION INTRAVENOUS at 10:25

## 2018-10-25 ASSESSMENT — PAIN SCALES - GENERAL: PAINLEVEL_OUTOF10: 0

## 2018-10-25 NOTE — PLAN OF CARE
Reviewed discharge instructions with patient and significant other. Verbalized understanding. Denies questions.

## 2018-12-11 ENCOUNTER — HOSPITAL ENCOUNTER (OUTPATIENT)
Dept: BONE DENSITY | Age: 58
Discharge: HOME | End: 2018-12-13
Payer: COMMERCIAL

## 2018-12-11 DIAGNOSIS — Z78.0 POST-MENOPAUSAL: ICD-10-CM

## 2018-12-11 PROCEDURE — 77080 DXA BONE DENSITY AXIAL: CPT

## 2018-12-25 ENCOUNTER — HOSPITAL ENCOUNTER (OUTPATIENT)
Age: 58
Setting detail: SPECIMEN
Discharge: HOME | End: 2018-12-25
Payer: COMMERCIAL

## 2018-12-25 LAB
-: ABNORMAL
AMORPHOUS: ABNORMAL
BACTERIA: ABNORMAL
BILIRUBIN URINE: NEGATIVE
CASTS UA: ABNORMAL /LPF
COLOR: YELLOW
COMMENT UA: ABNORMAL
CRYSTALS, UA: ABNORMAL /HPF
EPITHELIAL CELLS UA: ABNORMAL /HPF (ref 0–25)
GLUCOSE URINE: ABNORMAL
KETONES, URINE: NEGATIVE
LEUKOCYTE ESTERASE, URINE: NEGATIVE
MUCUS: ABNORMAL
NITRITE, URINE: NEGATIVE
OTHER OBSERVATIONS UA: ABNORMAL
PH UA: 6 (ref 5–9)
PROTEIN UA: NEGATIVE
RBC UA: ABNORMAL /HPF (ref 0–2)
RENAL EPITHELIAL, UA: ABNORMAL /HPF
SPECIFIC GRAVITY UA: 1.02 (ref 1.01–1.02)
TRICHOMONAS: ABNORMAL
TURBIDITY: CLEAR
URINE HGB: NEGATIVE
UROBILINOGEN, URINE: NORMAL
WBC UA: ABNORMAL /HPF (ref 0–5)
YEAST: ABNORMAL

## 2018-12-25 PROCEDURE — 87088 URINE BACTERIA CULTURE: CPT

## 2018-12-25 PROCEDURE — 87186 SC STD MICRODIL/AGAR DIL: CPT

## 2018-12-25 PROCEDURE — 87086 URINE CULTURE/COLONY COUNT: CPT

## 2018-12-25 PROCEDURE — 81001 URINALYSIS AUTO W/SCOPE: CPT

## 2018-12-28 LAB
CULTURE: ABNORMAL
Lab: ABNORMAL
ORGANISM: ABNORMAL
SPECIMEN DESCRIPTION: ABNORMAL
STATUS: ABNORMAL

## 2019-02-07 ENCOUNTER — HOSPITAL ENCOUNTER (OUTPATIENT)
Age: 59
Discharge: HOME | End: 2019-02-09
Payer: COMMERCIAL

## 2019-02-07 ENCOUNTER — HOSPITAL ENCOUNTER (OUTPATIENT)
Dept: GENERAL RADIOLOGY | Age: 59
Discharge: HOME | End: 2019-02-09
Payer: COMMERCIAL

## 2019-02-07 DIAGNOSIS — M54.9 BACK PAIN, UNSPECIFIED BACK LOCATION, UNSPECIFIED BACK PAIN LATERALITY, UNSPECIFIED CHRONICITY: ICD-10-CM

## 2019-02-07 PROCEDURE — 72100 X-RAY EXAM L-S SPINE 2/3 VWS: CPT

## 2019-02-07 PROCEDURE — 72072 X-RAY EXAM THORAC SPINE 3VWS: CPT

## 2019-05-13 ENCOUNTER — HOSPITAL ENCOUNTER (OUTPATIENT)
Age: 59
Setting detail: SPECIMEN
Discharge: HOME | End: 2019-05-13
Payer: COMMERCIAL

## 2019-05-13 DIAGNOSIS — N30.01 ACUTE CYSTITIS WITH HEMATURIA: ICD-10-CM

## 2019-05-13 PROCEDURE — 87086 URINE CULTURE/COLONY COUNT: CPT

## 2019-05-13 PROCEDURE — 87186 SC STD MICRODIL/AGAR DIL: CPT

## 2019-05-13 PROCEDURE — 87077 CULTURE AEROBIC IDENTIFY: CPT

## 2019-05-15 LAB
CULTURE: ABNORMAL
Lab: ABNORMAL
SPECIMEN DESCRIPTION: ABNORMAL

## 2019-09-24 LAB
AVERAGE GLUCOSE: NORMAL
HBA1C MFR BLD: 7.6 %

## 2019-10-23 PROBLEM — Z86.010 HISTORY OF COLON POLYPS: Status: ACTIVE | Noted: 2019-10-23

## 2019-10-23 PROBLEM — Z86.0100 HISTORY OF COLON POLYPS: Status: ACTIVE | Noted: 2019-10-23

## 2019-10-29 LAB
AVERAGE GLUCOSE: 205
HBA1C MFR BLD: 6.8 %

## 2019-11-01 ENCOUNTER — HOSPITAL ENCOUNTER (OUTPATIENT)
Age: 59
Discharge: HOME | End: 2019-11-01
Payer: COMMERCIAL

## 2019-11-01 DIAGNOSIS — E87.5 HYPERKALEMIA: ICD-10-CM

## 2019-11-01 LAB — POTASSIUM SERPL-SCNC: 4.6 MMOL/L (ref 3.7–5.3)

## 2019-11-01 PROCEDURE — 36415 COLL VENOUS BLD VENIPUNCTURE: CPT

## 2019-11-01 PROCEDURE — 84132 ASSAY OF SERUM POTASSIUM: CPT

## 2019-11-22 PROBLEM — Z12.11 COLON CANCER SCREENING: Status: RESOLVED | Noted: 2017-01-03 | Resolved: 2019-11-22

## 2019-12-03 ENCOUNTER — HOSPITAL ENCOUNTER (OUTPATIENT)
Age: 59
Discharge: HOME | End: 2019-12-03
Payer: COMMERCIAL

## 2019-12-03 ENCOUNTER — HOSPITAL ENCOUNTER (OUTPATIENT)
Age: 59
Discharge: HOME | End: 2019-12-05
Payer: COMMERCIAL

## 2019-12-03 ENCOUNTER — HOSPITAL ENCOUNTER (OUTPATIENT)
Dept: GENERAL RADIOLOGY | Age: 59
Discharge: HOME | End: 2019-12-05
Payer: COMMERCIAL
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
## 2019-12-03 DIAGNOSIS — R07.9 CHEST PAIN, UNSPECIFIED TYPE: ICD-10-CM

## 2019-12-03 DIAGNOSIS — M79.604 RIGHT LEG PAIN: ICD-10-CM

## 2019-12-03 LAB — D-DIMER QUANTITATIVE: <0.19 MG/L FEU (ref 0.19–0.5)

## 2019-12-03 PROCEDURE — 85379 FIBRIN DEGRADATION QUANT: CPT

## 2019-12-03 PROCEDURE — 73590 X-RAY EXAM OF LOWER LEG: CPT

## 2019-12-03 PROCEDURE — 36415 COLL VENOUS BLD VENIPUNCTURE: CPT

## 2019-12-04 ENCOUNTER — HOSPITAL ENCOUNTER (OUTPATIENT)
Dept: MRI IMAGING | Age: 59
Discharge: HOME | End: 2019-12-06
Payer: COMMERCIAL

## 2019-12-04 DIAGNOSIS — M25.561 ACUTE PAIN OF RIGHT KNEE: ICD-10-CM

## 2019-12-04 PROCEDURE — 73721 MRI JNT OF LWR EXTRE W/O DYE: CPT

## 2019-12-10 ENCOUNTER — ANESTHESIA (OUTPATIENT)
Dept: OPERATING ROOM | Age: 59
End: 2019-12-10
Payer: COMMERCIAL

## 2019-12-10 ENCOUNTER — HOSPITAL ENCOUNTER (OUTPATIENT)
Age: 59
Setting detail: OUTPATIENT SURGERY
Discharge: HOME | End: 2019-12-10
Attending: INTERNAL MEDICINE | Admitting: INTERNAL MEDICINE
Payer: COMMERCIAL

## 2019-12-10 ENCOUNTER — ANESTHESIA EVENT (OUTPATIENT)
Dept: OPERATING ROOM | Age: 59
End: 2019-12-10
Payer: COMMERCIAL

## 2019-12-10 VITALS
DIASTOLIC BLOOD PRESSURE: 64 MMHG | SYSTOLIC BLOOD PRESSURE: 121 MMHG | OXYGEN SATURATION: 100 % | RESPIRATION RATE: 15 BRPM

## 2019-12-10 VITALS
WEIGHT: 155 LBS | SYSTOLIC BLOOD PRESSURE: 157 MMHG | DIASTOLIC BLOOD PRESSURE: 81 MMHG | OXYGEN SATURATION: 100 % | RESPIRATION RATE: 18 BRPM | HEIGHT: 65 IN | TEMPERATURE: 98 F | HEART RATE: 80 BPM | BODY MASS INDEX: 25.83 KG/M2

## 2019-12-10 PROCEDURE — 45378 DIAGNOSTIC COLONOSCOPY: CPT | Performed by: INTERNAL MEDICINE

## 2019-12-10 PROCEDURE — 2500000003 HC RX 250 WO HCPCS: Performed by: NURSE ANESTHETIST, CERTIFIED REGISTERED

## 2019-12-10 PROCEDURE — 3700000000 HC ANESTHESIA ATTENDED CARE: Performed by: INTERNAL MEDICINE

## 2019-12-10 PROCEDURE — 2709999900 HC NON-CHARGEABLE SUPPLY: Performed by: INTERNAL MEDICINE

## 2019-12-10 PROCEDURE — 6360000002 HC RX W HCPCS: Performed by: NURSE ANESTHETIST, CERTIFIED REGISTERED

## 2019-12-10 PROCEDURE — 3700000001 HC ADD 15 MINUTES (ANESTHESIA): Performed by: INTERNAL MEDICINE

## 2019-12-10 PROCEDURE — 7100000010 HC PHASE II RECOVERY - FIRST 15 MIN: Performed by: INTERNAL MEDICINE

## 2019-12-10 PROCEDURE — 2580000003 HC RX 258: Performed by: INTERNAL MEDICINE

## 2019-12-10 PROCEDURE — 3609027000 HC COLONOSCOPY: Performed by: INTERNAL MEDICINE

## 2019-12-10 PROCEDURE — 7100000011 HC PHASE II RECOVERY - ADDTL 15 MIN: Performed by: INTERNAL MEDICINE

## 2019-12-10 RX ORDER — PROPOFOL 10 MG/ML
INJECTION, EMULSION INTRAVENOUS CONTINUOUS PRN
Status: DISCONTINUED | OUTPATIENT
Start: 2019-12-10 | End: 2019-12-10 | Stop reason: SDUPTHER

## 2019-12-10 RX ORDER — PROPOFOL 10 MG/ML
INJECTION, EMULSION INTRAVENOUS PRN
Status: DISCONTINUED | OUTPATIENT
Start: 2019-12-10 | End: 2019-12-10 | Stop reason: SDUPTHER

## 2019-12-10 RX ORDER — SODIUM CHLORIDE, SODIUM LACTATE, POTASSIUM CHLORIDE, CALCIUM CHLORIDE 600; 310; 30; 20 MG/100ML; MG/100ML; MG/100ML; MG/100ML
INJECTION, SOLUTION INTRAVENOUS CONTINUOUS
Status: DISCONTINUED | OUTPATIENT
Start: 2019-12-10 | End: 2019-12-10 | Stop reason: HOSPADM

## 2019-12-10 RX ORDER — LIDOCAINE HYDROCHLORIDE 20 MG/ML
INJECTION, SOLUTION EPIDURAL; INFILTRATION; INTRACAUDAL; PERINEURAL PRN
Status: DISCONTINUED | OUTPATIENT
Start: 2019-12-10 | End: 2019-12-10 | Stop reason: SDUPTHER

## 2019-12-10 RX ADMIN — LIDOCAINE HYDROCHLORIDE 100 MG: 20 INJECTION, SOLUTION EPIDURAL; INFILTRATION; INTRACAUDAL at 08:16

## 2019-12-10 RX ADMIN — SODIUM CHLORIDE, POTASSIUM CHLORIDE, SODIUM LACTATE AND CALCIUM CHLORIDE: 600; 310; 30; 20 INJECTION, SOLUTION INTRAVENOUS at 07:46

## 2019-12-10 RX ADMIN — PROPOFOL 200 MCG/KG/MIN: 10 INJECTION, EMULSION INTRAVENOUS at 08:16

## 2019-12-10 RX ADMIN — PROPOFOL 100 MG: 10 INJECTION, EMULSION INTRAVENOUS at 08:16

## 2019-12-10 ASSESSMENT — ENCOUNTER SYMPTOMS: SHORTNESS OF BREATH: 1

## 2019-12-10 ASSESSMENT — PAIN - FUNCTIONAL ASSESSMENT: PAIN_FUNCTIONAL_ASSESSMENT: 0-10

## 2020-01-05 PROBLEM — Z12.11 COLON CANCER SCREENING: Status: RESOLVED | Noted: 2017-01-03 | Resolved: 2020-01-05

## 2020-03-09 ENCOUNTER — HOSPITAL ENCOUNTER (OUTPATIENT)
Dept: MRI IMAGING | Age: 60
Discharge: HOME | End: 2020-03-11
Payer: COMMERCIAL

## 2020-03-09 PROCEDURE — 72141 MRI NECK SPINE W/O DYE: CPT

## 2020-03-24 ENCOUNTER — HOSPITAL ENCOUNTER (OUTPATIENT)
Age: 60
Setting detail: OUTPATIENT SURGERY
Discharge: HOME | End: 2020-03-24
Attending: ANESTHESIOLOGY | Admitting: ANESTHESIOLOGY
Payer: COMMERCIAL

## 2020-03-24 ENCOUNTER — APPOINTMENT (OUTPATIENT)
Dept: GENERAL RADIOLOGY | Age: 60
End: 2020-03-24
Attending: ANESTHESIOLOGY
Payer: COMMERCIAL

## 2020-03-24 VITALS
HEIGHT: 64 IN | RESPIRATION RATE: 16 BRPM | OXYGEN SATURATION: 96 % | TEMPERATURE: 97 F | SYSTOLIC BLOOD PRESSURE: 143 MMHG | HEART RATE: 66 BPM | WEIGHT: 164 LBS | BODY MASS INDEX: 28 KG/M2 | DIASTOLIC BLOOD PRESSURE: 74 MMHG

## 2020-03-24 PROBLEM — M50.30 DDD (DEGENERATIVE DISC DISEASE), CERVICAL: Chronic | Status: ACTIVE | Noted: 2020-03-24

## 2020-03-24 PROCEDURE — 99152 MOD SED SAME PHYS/QHP 5/>YRS: CPT | Performed by: ANESTHESIOLOGY

## 2020-03-24 PROCEDURE — 7100000011 HC PHASE II RECOVERY - ADDTL 15 MIN: Performed by: ANESTHESIOLOGY

## 2020-03-24 PROCEDURE — 6360000004 HC RX CONTRAST MEDICATION: Performed by: ANESTHESIOLOGY

## 2020-03-24 PROCEDURE — 3600000002 HC SURGERY LEVEL 2 BASE: Performed by: ANESTHESIOLOGY

## 2020-03-24 PROCEDURE — 7100000010 HC PHASE II RECOVERY - FIRST 15 MIN: Performed by: ANESTHESIOLOGY

## 2020-03-24 PROCEDURE — 6360000002 HC RX W HCPCS: Performed by: ANESTHESIOLOGY

## 2020-03-24 PROCEDURE — 2500000003 HC RX 250 WO HCPCS: Performed by: ANESTHESIOLOGY

## 2020-03-24 PROCEDURE — 3209999900 FLUORO FOR SURGICAL PROCEDURES

## 2020-03-24 PROCEDURE — 2709999900 HC NON-CHARGEABLE SUPPLY: Performed by: ANESTHESIOLOGY

## 2020-03-24 PROCEDURE — 2580000003 HC RX 258: Performed by: ANESTHESIOLOGY

## 2020-03-24 RX ORDER — MIDAZOLAM HYDROCHLORIDE 1 MG/ML
INJECTION INTRAMUSCULAR; INTRAVENOUS PRN
Status: DISCONTINUED | OUTPATIENT
Start: 2020-03-24 | End: 2020-03-24 | Stop reason: ALTCHOICE

## 2020-03-24 RX ORDER — SODIUM CHLORIDE, SODIUM LACTATE, POTASSIUM CHLORIDE, CALCIUM CHLORIDE 600; 310; 30; 20 MG/100ML; MG/100ML; MG/100ML; MG/100ML
INJECTION, SOLUTION INTRAVENOUS CONTINUOUS
Status: DISCONTINUED | OUTPATIENT
Start: 2020-03-24 | End: 2020-03-24 | Stop reason: HOSPADM

## 2020-03-24 RX ORDER — LIDOCAINE HYDROCHLORIDE 10 MG/ML
INJECTION, SOLUTION EPIDURAL; INFILTRATION; INTRACAUDAL; PERINEURAL PRN
Status: DISCONTINUED | OUTPATIENT
Start: 2020-03-24 | End: 2020-03-24 | Stop reason: ALTCHOICE

## 2020-03-24 RX ORDER — DEXAMETHASONE SODIUM PHOSPHATE 4 MG/ML
INJECTION, SOLUTION INTRA-ARTICULAR; INTRALESIONAL; INTRAMUSCULAR; INTRAVENOUS; SOFT TISSUE PRN
Status: DISCONTINUED | OUTPATIENT
Start: 2020-03-24 | End: 2020-03-24 | Stop reason: ALTCHOICE

## 2020-03-24 RX ORDER — SODIUM CHLORIDE 9 MG/ML
INJECTION INTRAVENOUS PRN
Status: DISCONTINUED | OUTPATIENT
Start: 2020-03-24 | End: 2020-03-24 | Stop reason: ALTCHOICE

## 2020-03-24 RX ORDER — SODIUM CHLORIDE 0.9 % (FLUSH) 0.9 %
10 SYRINGE (ML) INJECTION EVERY 12 HOURS SCHEDULED
Status: DISCONTINUED | OUTPATIENT
Start: 2020-03-24 | End: 2020-03-24 | Stop reason: HOSPADM

## 2020-03-24 RX ORDER — FENTANYL CITRATE 50 UG/ML
INJECTION, SOLUTION INTRAMUSCULAR; INTRAVENOUS PRN
Status: DISCONTINUED | OUTPATIENT
Start: 2020-03-24 | End: 2020-03-24 | Stop reason: ALTCHOICE

## 2020-03-24 RX ORDER — METHYLPREDNISOLONE ACETATE 40 MG/ML
INJECTION, SUSPENSION INTRA-ARTICULAR; INTRALESIONAL; INTRAMUSCULAR; SOFT TISSUE PRN
Status: DISCONTINUED | OUTPATIENT
Start: 2020-03-24 | End: 2020-03-24 | Stop reason: ALTCHOICE

## 2020-03-24 RX ORDER — SODIUM CHLORIDE 0.9 % (FLUSH) 0.9 %
10 SYRINGE (ML) INJECTION PRN
Status: DISCONTINUED | OUTPATIENT
Start: 2020-03-24 | End: 2020-03-24 | Stop reason: HOSPADM

## 2020-03-24 RX ADMIN — SODIUM CHLORIDE, POTASSIUM CHLORIDE, SODIUM LACTATE AND CALCIUM CHLORIDE: 600; 310; 30; 20 INJECTION, SOLUTION INTRAVENOUS at 12:32

## 2020-03-24 ASSESSMENT — PAIN SCALES - GENERAL: PAINLEVEL_OUTOF10: 5

## 2020-03-24 ASSESSMENT — PAIN - FUNCTIONAL ASSESSMENT: PAIN_FUNCTIONAL_ASSESSMENT: 0-10

## 2020-03-24 ASSESSMENT — PAIN DESCRIPTION - DESCRIPTORS: DESCRIPTORS: ACHING;STABBING

## 2020-03-24 ASSESSMENT — PAIN DESCRIPTION - LOCATION: LOCATION: NECK

## 2020-03-24 ASSESSMENT — PAIN DESCRIPTION - PAIN TYPE: TYPE: CHRONIC PAIN

## 2020-03-24 NOTE — PROGRESS NOTES
Patient verbalizes readiness for discharge. All criteria met. IV removed. All belongings provided to patient. Discharge Criteria    Inpatients must meet Criteria 1 through 7. All other patients are either YES or N/A. If a NO is chosen then Anesthesia or Surgeon must be notified. 1.  Minimum 30 minutes after last dose of sedative medication, minimum 120 minutes after last dose of reversal agent. Yes      2. Systolic BP stable within 20 mmHg for 30 minutes & systolic BP between 90 & 316 or within 10 mmHg of baseline. Yes      3. Pulse between 60 and 100 or within 10 bpm of baseline. Yes      4. Spontaneous respiratory rate >/= 10 per minute. Yes      5. SaO2 >/= 95 or  >/= baseline. Yes      6. Able to cough and swallow or return to baseline function. Yes      7. Alert and oriented or return to baseline mental status. Yes      8. Demonstrates controlled, coordinated movements, ambulates with steady gait, or return to baseline activity function. Yes      9. Minimal or no pain or nausea, or at a level tolerable and acceptable to patient. Yes      10. Takes and retains oral fluids as allowed. Yes      11. Procedural / perioperative site stable. Minimal or no bleeding. Yes          12. If GI endoscopy procedure, minimal or no abdominal distention or passing flatus. N/A      13. Written discharge instructions and emergency telephone number provided. Yes      14. Accompanied by a responsible adult.     Yes

## 2020-05-08 ENCOUNTER — HOSPITAL ENCOUNTER (OUTPATIENT)
Age: 60
Setting detail: SPECIMEN
Discharge: HOME | End: 2020-05-08
Payer: COMMERCIAL

## 2020-05-08 PROCEDURE — 87186 SC STD MICRODIL/AGAR DIL: CPT

## 2020-05-08 PROCEDURE — 87086 URINE CULTURE/COLONY COUNT: CPT

## 2020-05-08 PROCEDURE — 87088 URINE BACTERIA CULTURE: CPT

## 2020-05-10 LAB
CULTURE: ABNORMAL
Lab: ABNORMAL
SPECIMEN DESCRIPTION: ABNORMAL

## 2020-06-30 LAB
AVERAGE GLUCOSE: NORMAL
HBA1C MFR BLD: 7.3 %

## 2020-07-01 ENCOUNTER — HOSPITAL ENCOUNTER (OUTPATIENT)
Dept: WOMENS IMAGING | Age: 60
Discharge: HOME | End: 2020-07-03
Payer: COMMERCIAL

## 2020-07-01 PROCEDURE — 77063 BREAST TOMOSYNTHESIS BI: CPT

## 2020-08-07 ENCOUNTER — OFFICE VISIT (OUTPATIENT)
Dept: FAMILY MEDICINE CLINIC | Age: 60
End: 2020-08-07
Payer: COMMERCIAL

## 2020-08-07 VITALS
HEIGHT: 64 IN | SYSTOLIC BLOOD PRESSURE: 138 MMHG | WEIGHT: 159 LBS | BODY MASS INDEX: 27.14 KG/M2 | DIASTOLIC BLOOD PRESSURE: 66 MMHG

## 2020-08-07 PROCEDURE — 99213 OFFICE O/P EST LOW 20 MIN: CPT | Performed by: NURSE PRACTITIONER

## 2020-08-07 RX ORDER — CLOBETASOL PROPIONATE 0.5 MG/G
OINTMENT TOPICAL
Qty: 1 TUBE | Refills: 2 | Status: SHIPPED | OUTPATIENT
Start: 2020-08-07 | End: 2020-08-21 | Stop reason: SDUPTHER

## 2020-08-07 ASSESSMENT — PATIENT HEALTH QUESTIONNAIRE - PHQ9
2. FEELING DOWN, DEPRESSED OR HOPELESS: 0
1. LITTLE INTEREST OR PLEASURE IN DOING THINGS: 0
SUM OF ALL RESPONSES TO PHQ QUESTIONS 1-9: 0
SUM OF ALL RESPONSES TO PHQ QUESTIONS 1-9: 0
SUM OF ALL RESPONSES TO PHQ9 QUESTIONS 1 & 2: 0

## 2020-08-07 NOTE — PROGRESS NOTES
Name: Shanta Arevalo  : 1960         Chief Complaint:     Chief Complaint   Patient presents with    Vaginal Itching     anal itching, vulvar bruising and abrasions, vaginal dryness, white patches x 1-2 days, has had slight pain with intercourse the past couple months       History of Present Illness:      Shanta Arevalo is a 63 y.o.  female who presents with Vaginal Itching (anal itching, vulvar bruising and abrasions, vaginal dryness, white patches x 1-2 days, has had slight pain with intercourse the past couple months)      HPI   The patient presents with vulvar and anal itching. The pruritic symptoms have been intermittent since she was in her 20's. Within the last three months, she noticed red/purple areas present on her vulva with \"paper cut looking-cuts\" that she attributes to scratching. She also admits maceration in her right groin \"skin fold\" area that she has experienced once before. She admits \"white patches\" present near her clitoris that are very pruritic that appeared 1-2 days ago. She denies pain with the lesions. She admits a history of frequent UTIs for which she was prescribed premarin cream 18 months ago. She had a slight burning with urination that occurred earlier in the week but has sinced resolved. She uses the premarin cream a few times per week but has not used it within the last month. She reports a history of hemorrhoids. She admits dyspareunia  with use of lubrication. She denies vaginal discharge, vaginal odor, rectal bleeding, abdominal pain, or blood present on the toilet paper or stool. Past Medical History:     Past Medical History:   Diagnosis Date    Diabetes mellitus ([address]     H/O cardiovascular stress test 2017    Largerly normal moyocardial perfusion imaging with a soft tissue artifact, but without evidence of significant myocardial ischemia or infarction. EF 66%. The pt Flores treadmill score is 4 which correlates with a low/intermiediate risk for CAD. Leticia Ch MD   conjugated estrogens (PREMARIN) 0.625 MG/GM vaginal cream Place 0.5 g vaginally daily Place vaginally daily X 2 weeks, then use 3 X weekly 7/13/20  Yes Franklyn Bonilla MD   methenamine (HIPREX) 1 g tablet Take 1 tablet by mouth 4 times daily 7/13/20  Yes Franklyn Bonilla MD   dilTIAZem (CARDIZEM CD) 360 MG extended release capsule TAKE 1 CAPSULE BY MOUTH ONCE DAILY 5/29/20  Yes Franklyn Bonilla MD   atorvastatin (LIPITOR) 10 MG tablet TAKE 1 TABLET BY MOUTH ONCE DAILY 4/3/20  Yes Franklyn Bonilla MD   enalapril (VASOTEC) 2.5 MG tablet TAKE 1 TABLET BY MOUTH ONCE DAILY 4/3/20  Yes Franklyn Bonilla MD   Bacillus Coagulans-Inulin (ALIGN PREBIOTIC-PROBIOTIC PO) Take 1 tablet by mouth daily   Yes Historical Provider, MD   insulin aspart (NOVOLOG) 100 UNIT/ML injection vial 100 units qd per pump 10/23/18  Yes Franklyn Bonilla MD   Psyllium (METAMUCIL PO) Take by mouth daily   Yes Historical Provider, MD   acetaminophen (TYLENOL) 500 MG tablet Take 2 tablets by mouth every 6 hours as needed for Pain or Fever 9/25/18  Yes Vinita Winkler PA-C   fluocinonide (LIDEX) 0.05 % cream Apply topically 2 times daily. Patient taking differently: as needed Apply topically 2 times daily. 11/17/17  Yes Franklyn Bonilla MD   EPINEPHrine (EPIPEN 2-RAS) 0.3 MG/0.3ML SOAJ injection Inject 0.3 mLs into the muscle once for 1 dose Use as directed for allergic reaction 5/5/20 5/5/20  Franklyn Bonilla MD   Lisdexamfetamine Dimesylate (VYVANSE) 60 MG CAPS Take 1 qd 2/24/20 3/26/20  Franklyn Bonilla MD        Allergies:       Bee venom and Sulfa antibiotics    Social History:     Tobacco:    reports that she quit smoking about 20 years ago. She has a 1.25 pack-year smoking history. She has never used smokeless tobacco.  Alcohol:      reports current alcohol use. Drug Use:  reports no history of drug use.     Family History:     Family History   Problem Relation Age of Onset    Heart Attack Brother 50       Review of Systems: Positive and Negative as described in HPI    Review of Systems   Gastrointestinal: Negative for abdominal pain and blood in stool. Genitourinary: Positive for dyspareunia, dysuria and genital sores. Negative for pelvic pain and vaginal discharge. Physical Exam:   Vitals:  /66   Ht 5' 4\" (1.626 m)   Wt 159 lb (72.1 kg)   BMI 27.29 kg/m²     Physical Exam  Constitutional:       Appearance: Normal appearance. HENT:      Head: Normocephalic. Genitourinary:     Comments: White plaques present in a circular pattern surrounding anus, skin atrophy present. No anal hemorrhoids visualized. Hyperkeratotic white plaques present near clitoral rachel with surrounding hypopigmented atrophy. Sharply demarcated blue/red ecchymosis present on labia minora bilaterally and clitoral rachel. Microlacerations present near clitoral rachel, no excoriation. Right groin with areas of hypopigmented skin atrophy. Single, linear erythematous lesion present along skin fold of right groin, no bleeding or discharge. No lesions present in vaginal canal or on cervix. Cervix pink and round. Vaginal rugae pink. No CMT with palpation. Ovaries non-palpable bilaterally. Skin:     General: Skin is warm. Neurological:      Mental Status: She is alert and oriented to person, place, and time. Psychiatric:         Mood and Affect: Mood normal.         Behavior: Behavior normal.         Thought Content:  Thought content normal.         Judgment: Judgment normal.          Data:     Lab Results   Component Value Date    .0 10/30/2019    K 4.6 11/01/2019    .0 10/30/2019    CO2 29.0 10/30/2019    BUN 19.0 10/30/2019    CREATININE 0.9 10/30/2019    GLUCOSE 205.0 10/30/2019    PROT 6.1 09/25/2018    LABALBU 4.2 10/30/2019    BILITOT 0.60 10/30/2019    ALKPHOS 80.0 10/30/2019    AST 26.0 10/30/2019    ALT 37.0 10/30/2019     Lab Results   Component Value Date    WBC 7.0 10/24/2018    RBC 4.30 10/24/2018    HGB 13.6 10/24/2018    HCT 41.7 10/24/2018    MCV 97.0 10/24/2018    MCH 31.6 10/24/2018    MCHC 32.6 10/24/2018    RDW 12.5 10/24/2018     10/24/2018    MPV 9.1 10/24/2018     Lab Results   Component Value Date    TSH 1.80 10/24/2017     Lab Results   Component Value Date    CHOL 193 02/20/2017     10/30/2019    LABA1C 6.8 10/30/2019       Assessment/Plan:      Diagnosis Orders   1. Lichen sclerosus of female genitalia  clobetasol (TEMOVATE) 0.05 % ointment       Lichen Sclerosis:   - Diagnosis was explained to the patient. Patient was shown pictures of dermatological disease presentation for further education and explanation.   - Topical clobetasol prescribed. Discussed with the patient the potential need for a vulvar biopsy if symptoms do not improve with steroid cream.   - Proper vulvovaginal hygiene techniques discussed with the patient. - Patient education pamphlet supplied that gives detail of lichen sclerosis, its symptoms, treatment options, etc.   - Patient to follow up in 2 weeks or sooner if symptoms worsen or persist.       Completed Refills   Requested Prescriptions     Signed Prescriptions Disp Refills    clobetasol (TEMOVATE) 0.05 % ointment 1 Tube 2     Sig: Apply topically once daily at night. No orders of the defined types were placed in this encounter. No results found for this visit on 08/07/20. Return in about 2 weeks (around 8/21/2020), or if symptoms worsen or fail to improve.     Electronically signed by WILMER García CNP on 08/08/20 at 1:22 PM.

## 2020-08-08 ASSESSMENT — ENCOUNTER SYMPTOMS
ABDOMINAL PAIN: 0
BLOOD IN STOOL: 0

## 2020-08-21 ENCOUNTER — OFFICE VISIT (OUTPATIENT)
Dept: FAMILY MEDICINE CLINIC | Age: 60
End: 2020-08-21
Payer: COMMERCIAL

## 2020-08-21 VITALS
WEIGHT: 153 LBS | SYSTOLIC BLOOD PRESSURE: 132 MMHG | HEIGHT: 64 IN | DIASTOLIC BLOOD PRESSURE: 70 MMHG | BODY MASS INDEX: 26.12 KG/M2

## 2020-08-21 PROBLEM — N90.4 LICHEN SCLEROSUS OF FEMALE GENITALIA: Status: ACTIVE | Noted: 2020-08-21

## 2020-08-21 PROCEDURE — 99213 OFFICE O/P EST LOW 20 MIN: CPT | Performed by: NURSE PRACTITIONER

## 2020-08-21 RX ORDER — CLOBETASOL PROPIONATE 0.5 MG/G
OINTMENT TOPICAL
Qty: 1 TUBE | Refills: 2 | Status: SHIPPED | OUTPATIENT
Start: 2020-08-21

## 2020-08-21 NOTE — PROGRESS NOTES
Name: Maldonado Rodriguez  : 1960         Chief Complaint:     Chief Complaint   Patient presents with    Follow-up     2 week , lichen sclerosis, treated with topical clobetasol, today she states it has helped tremendously, still has fatmata minor tenderness but no itching. She did miss one night and it caused some increased tenderness the next morning       History of Present Illness:      Maldonado Rodriguez is a 63 y.o.  female who presents with Follow-up (2 week , lichen sclerosis, treated with topical clobetasol, today she states it has helped tremendously, still has fatmata minor tenderness but no itching. She did miss one night and it caused some increased tenderness the next morning)      HPI  The patient presents for a two week follow up for vulvar lichen sclerosis. She has been using clobetasol 0.05% ointment daily at night. She denies bothersome side effects from this medication. Denies noticeable thinning of the skin with this medication. She states that since she began using the medication, her symptoms have improved greatly. She states that her discoloration has improved, her perianal and vulvar pruritis has improved, the \"tears\" have since improved. She admits that she has switched from wearing no underwear to cotton underwear to allow for more protection to her vulvar area. She has also started to wash her vulvar area with just water, rather than soap. She has noticed decreased irritation to the vulvar area since making this change. She does note one location of tenderness at the top portion of her right labia. Past Medical History:     Past Medical History:   Diagnosis Date    Diabetes mellitus ([address]     H/O cardiovascular stress test 2017    Largerly normal moyocardial perfusion imaging with a soft tissue artifact, but without evidence of significant myocardial ischemia or infarction. EF 66%. The pt Flores treadmill score is 4 which correlates with a low/intermiediate risk for CAD.      History 4 weeks. Then apply topically once every other day x 4 weeks. Apply topically twice a week x 4 weeks. 8/21/20  Yes WILMER Muhammad - CNP   amphetamine-dextroamphetamine (ADDERALL XR) 30 MG extended release capsule 1 q am, 1 q noon 7/27/20 8/27/20 Yes Leticia Ch MD   conjugated estrogens (PREMARIN) 0.625 MG/GM vaginal cream Place 0.5 g vaginally daily Place vaginally daily X 2 weeks, then use 3 X weekly  Patient taking differently: Place 0.5 g vaginally daily Place vaginally twice weekly 7/13/20  Yes Franklyn Bonilla MD   methenamine (HIPREX) 1 g tablet Take 1 tablet by mouth 4 times daily  Patient taking differently: Take 1 g by mouth 2 times daily  7/13/20  Yes Franklyn Bonilla MD   dilTIAZem (CARDIZEM CD) 360 MG extended release capsule TAKE 1 CAPSULE BY MOUTH ONCE DAILY 5/29/20  Yes Farnklyn Bonilla MD   atorvastatin (LIPITOR) 10 MG tablet TAKE 1 TABLET BY MOUTH ONCE DAILY 4/3/20  Yes Franklyn Bonilla MD   enalapril (VASOTEC) 2.5 MG tablet TAKE 1 TABLET BY MOUTH ONCE DAILY 4/3/20  Yes Franklyn Bonilla MD   Bacillus Coagulans-Inulin (ALIGN PREBIOTIC-PROBIOTIC PO) Take 1 tablet by mouth daily   Yes Historical Provider, MD   insulin aspart (NOVOLOG) 100 UNIT/ML injection vial 100 units qd per pump 10/23/18  Yes Franklyn Bonilla MD   Psyllium (METAMUCIL PO) Take by mouth daily   Yes Historical Provider, MD   acetaminophen (TYLENOL) 500 MG tablet Take 2 tablets by mouth every 6 hours as needed for Pain or Fever 9/25/18  Yes Liberty Winkler PA-C   EPINEPHrine (EPIPEN 2-RAS) 0.3 MG/0.3ML SOAJ injection Inject 0.3 mLs into the muscle once for 1 dose Use as directed for allergic reaction 5/5/20 5/5/20  Franklyn Bonilla MD   fluocinonide (LIDEX) 0.05 % cream Apply topically 2 times daily. Patient not taking: Reported on 8/21/2020 11/17/17   Franklyn Bonilla MD        Allergies:       Bee venom and Sulfa antibiotics    Social History:     Tobacco:    reports that she quit smoking about 20 years ago.  She has a 1.25 pack-year smoking history. She has never used smokeless tobacco.  Alcohol:      reports current alcohol use. Drug Use:  reports no history of drug use. Family History:     Family History   Problem Relation Age of Onset    Heart Attack Brother 48       Review of Systems:     Positive and Negative as described in HPI    Review of Systems   Constitutional: Negative for chills and fever. Physical Exam:   Vitals:  /70   Ht 5' 4\" (1.626 m)   Wt 153 lb (69.4 kg)   BMI 26.26 kg/m²     Physical Exam  Constitutional:       General: She is not in acute distress. Appearance: Normal appearance. She is not ill-appearing. HENT:      Head: Normocephalic. Cardiovascular:      Rate and Rhythm: Normal rate and regular rhythm. Heart sounds: Normal heart sounds. No murmur. Comments: HR 76  Pulmonary:      Effort: Pulmonary effort is normal. No respiratory distress. Breath sounds: Normal breath sounds. No stridor. No wheezing, rhonchi or rales. Skin:     General: Skin is warm. Comments: (1) White plaques present in a circular pattern surrounding anus, skin atrophy present. No anal hemorrhoids visualized. Presentation has improved since last examination 2 weeks ago. (2) Sharply demarcated blue/red ecchymosis present on labia minora bilaterally, clitoral rachel, and mons pubis. Presentation has improved since last examination 2 weeks ago, lesions have decreased in size and number. (3) No lacerations or fissures present near clitoral rachel, no excoriation. (4) Right groin with areas of hypopigmentation. No open lesions present. Neurological:      Mental Status: She is alert and oriented to person, place, and time. Psychiatric:         Mood and Affect: Mood normal.         Behavior: Behavior normal.         Thought Content:  Thought content normal.         Judgment: Judgment normal.         Data:     Lab Results   Component Value Date    .0 10/30/2019    K 4.6 11/01/2019    .0 10/30/2019    CO2 29.0 10/30/2019    BUN 19.0 10/30/2019    CREATININE 0.9 10/30/2019    GLUCOSE 205.0 10/30/2019    PROT 6.1 09/25/2018    LABALBU 4.2 10/30/2019    BILITOT 0.60 10/30/2019    ALKPHOS 80.0 10/30/2019    AST 26.0 10/30/2019    ALT 37.0 10/30/2019     Lab Results   Component Value Date    WBC 7.0 10/24/2018    RBC 4.30 10/24/2018    HGB 13.6 10/24/2018    HCT 41.7 10/24/2018    MCV 97.0 10/24/2018    MCH 31.6 10/24/2018    MCHC 32.6 10/24/2018    RDW 12.5 10/24/2018     10/24/2018    MPV 9.1 10/24/2018     Lab Results   Component Value Date    TSH 1.80 10/24/2017     Lab Results   Component Value Date    CHOL 193 02/20/2017     10/30/2019    LABA1C 6.8 10/30/2019       Assessment/Plan:      Diagnosis Orders   1. Lichen sclerosus of female genitalia  clobetasol (TEMOVATE) 0.05 % ointment       Lichen Sclerosis:   - Patient's symptoms and clinical examination has improved greatly since her last visit 2 weeks ago. She is to continue use of topical clobetasol. She was instructed to continue the use of topical daily clobetasol for another 2 weeks (4 weeks total) at night. She should then apply it once topically every other day x 4 weeks at night. Then once topically 2 times per week at night x 4 weeks. I discussed that the treatment plan for this condition is longer in duration. She should continue to follow the necessary taper until she reaches maintenance phase.  - We discussed proper vulvovaginal hygiene. She has seen improvements since washing her vulvar area with water only, without soap. We discussed other harsh chemicals that could increase irritation.  - I discussed with the patient that she is able to participate in sexual intercourse. However, she should avoid sexual activity after the clobetasol has been applied. I warned about the potential side effects of skin thinning with the use of a high-potency steroid.  She should assess her vulvar areas for signs of tearing in the skin or fissures. She should minimize friction to her vulvar area during intercourse. - Patient educated that symptoms should continue to improve. If she has a worsening in symptoms, she should alert the office. Completed Refills   Requested Prescriptions     Signed Prescriptions Disp Refills    clobetasol (TEMOVATE) 0.05 % ointment 1 Tube 2     Sig: Apply topically once daily at night x 4 weeks. Then apply topically once every other day x 4 weeks. Apply topically twice a week x 4 weeks. No orders of the defined types were placed in this encounter. No results found for this visit on 08/21/20. Return in about 3 months (around 11/21/2020), or if symptoms worsen or fail to improve.     Electronically signed by WILMER Ann CNP on 08/23/20 at 6:58 PM.

## 2020-09-25 RX ORDER — DEXTROAMPHETAMINE SACCHARATE, AMPHETAMINE ASPARTATE MONOHYDRATE, DEXTROAMPHETAMINE SULFATE AND AMPHETAMINE SULFATE 7.5; 7.5; 7.5; 7.5 MG/1; MG/1; MG/1; MG/1
CAPSULE, EXTENDED RELEASE ORAL
Qty: 60 CAPSULE | Refills: 0 | OUTPATIENT
Start: 2020-09-25 | End: 2020-10-26

## 2020-09-25 NOTE — TELEPHONE ENCOUNTER
This is a new patient and I have only seen her for an acute problem in the past. I will have to evaluate her in office before I prescribe her adderall to complete an OAR[place] review. Please schedule wellness/follow up appointment for patient. Thanks!

## 2020-09-29 ENCOUNTER — HOSPITAL ENCOUNTER (OUTPATIENT)
Age: 60
Setting detail: SPECIMEN
Discharge: HOME | End: 2020-09-29
Payer: COMMERCIAL

## 2020-09-29 ENCOUNTER — OFFICE VISIT (OUTPATIENT)
Dept: FAMILY MEDICINE CLINIC | Age: 60
End: 2020-09-29
Payer: COMMERCIAL

## 2020-09-29 VITALS
WEIGHT: 154 LBS | BODY MASS INDEX: 26.29 KG/M2 | SYSTOLIC BLOOD PRESSURE: 134 MMHG | DIASTOLIC BLOOD PRESSURE: 80 MMHG | HEIGHT: 64 IN

## 2020-09-29 PROCEDURE — 87624 HPV HI-RISK TYP POOLED RSLT: CPT

## 2020-09-29 PROCEDURE — 99396 PREV VISIT EST AGE 40-64: CPT | Performed by: NURSE PRACTITIONER

## 2020-09-29 PROCEDURE — G0145 SCR C/V CYTO,THINLAYER,RESCR: HCPCS

## 2020-09-29 RX ORDER — TRAZODONE HYDROCHLORIDE 50 MG/1
50 TABLET ORAL NIGHTLY PRN
Qty: 60 TABLET | Refills: 1 | Status: CANCELLED | OUTPATIENT
Start: 2020-09-29

## 2020-09-29 RX ORDER — DEXTROAMPHETAMINE SACCHARATE, AMPHETAMINE ASPARTATE MONOHYDRATE, DEXTROAMPHETAMINE SULFATE AND AMPHETAMINE SULFATE 7.5; 7.5; 7.5; 7.5 MG/1; MG/1; MG/1; MG/1
CAPSULE, EXTENDED RELEASE ORAL
Qty: 60 CAPSULE | Refills: 0 | Status: SHIPPED | OUTPATIENT
Start: 2020-09-29 | End: 2020-11-05 | Stop reason: SDUPTHER

## 2020-09-29 RX ORDER — CONJUGATED ESTROGENS 0.62 MG/G
CREAM VAGINAL DAILY
Qty: 1 TUBE | Refills: 1 | Status: SHIPPED | OUTPATIENT
Start: 2020-09-29

## 2020-09-29 ASSESSMENT — ENCOUNTER SYMPTOMS
SHORTNESS OF BREATH: 0
ABDOMINAL PAIN: 0
CONSTIPATION: 0
RHINORRHEA: 0
DIARRHEA: 0
SORE THROAT: 0
SINUS PAIN: 0
SINUS PRESSURE: 0
COUGH: 0
CHEST TIGHTNESS: 0
EYE PAIN: 0
EYE ITCHING: 0
WHEEZING: 0

## 2020-09-29 ASSESSMENT — PATIENT HEALTH QUESTIONNAIRE - PHQ9
SUM OF ALL RESPONSES TO PHQ QUESTIONS 1-9: 0
SUM OF ALL RESPONSES TO PHQ QUESTIONS 1-9: 0
2. FEELING DOWN, DEPRESSED OR HOPELESS: 0
1. LITTLE INTEREST OR PLEASURE IN DOING THINGS: 0
SUM OF ALL RESPONSES TO PHQ9 QUESTIONS 1 & 2: 0

## 2020-09-29 NOTE — PROGRESS NOTES
Name: Mohinder Hector  : 1960         Chief Complaint:     Chief Complaint   Patient presents with    Other     Patient comes into the office she is having trouble sleeping due to recent husbands condition. Started in August, trouble falling asleep. maybe getting 3 hours of sleep on average. History of Present Illness:      Mohinder Hector is a 62 y.o.  female who presents with Other (Patient comes into the office she is having trouble sleeping due to recent husbands condition. Started in August, trouble falling asleep. maybe getting 3 hours of sleep on average. )      HPI     Insomnia:   The patient admits a history of insomnia that she believes is related to her 's current medical condition. She states that since he has come home from the hospital, her sleep has been interrupted. This been ongoing for \"months\". She admits concerns with falling and remaining asleep. She admits frequent awakenings and only getting 3 hours per night and feeling tired during the day. She has tried melatonin, sleepy time extra, breathing exercise, and tea with no relief. She admits frequent \"tossing and turning\" that is worsening. Denies gasping for air during sleep. Admits snoring, waking up to urinate when her blood glucose is elevated, and racing thoughts. She admits anxiety and increased stress. She has tried trazodone in the past which was successful in relieving her insomnia. She has also tried Ambien but did not like the way it made her feel. Anxiety:   Admits anxiety and increased stress. She states that her 's health has added stress to her life. She admits frustration. She admits feeling short-tempered and frustrated. Aside from the anxiety she states that her mood is well. She denies depressive thoughts. DM:   She is seeing an endocrinologist in [facility] for the last 2.5 years. She admits last A1c 2020 was 7.3. She has an insulin pump and is taking novalog.  She admits skipping meals (lunch). The patient is following a low carbohydrate diet and is not eating desserts. For exercise, she is remaining busy around the house and performing yard work. She admits occasional hypoglycemic episodes, lowest reading 48. She states that her numbers never get into the \"danger zone\" because she knows the symptoms of hypoglycemia and corrects. She keeps glucose tabs with her. ADD:  The patient admits a history of ADD, diagnosed by Dr. Tamar Quiroz (psychiatrist) in [place]. She has been on the same adderall dosage 30mg once in the morning and once at noon for 10 years. She took a break from adderall last year and tried vivance with no relief in symptoms. She is able to focus well and is not distracted on her regimen. Past Medical History:     Past Medical History:   Diagnosis Date    Diabetes mellitus ([address]     H/O cardiovascular stress test 11/14/2017    Largerly normal moyocardial perfusion imaging with a soft tissue artifact, but without evidence of significant myocardial ischemia or infarction. EF 66%. The pt Flores treadmill score is 4 which correlates with a low/intermiediate risk for CAD.  History of Holter monitoring 02/16/2018    Very frequent PVC's. No symptoms were reported. Very frequent premature ventricular ectopic beats total 5610 consisting of 5610 isolated PVC's.  History of Holter monitoring 02/15/2018    Very frequent PVC's. No symptoms were reported. Consider additional testing and/or treatment if clinically indicated.      PONV (postoperative nausea and vomiting)       Reviewed all health maintenance requirements and ordered appropriate tests  Health Maintenance Due   Topic Date Due    Hepatitis C screen  1960    Diabetic foot exam  09/01/1971    HIV screen  09/01/1976    Hepatitis B vaccine (1 of 3 - Risk 3-dose series) 09/01/1980    Cervical cancer screen  09/01/1982    Shingles Vaccine (2 of 2) 12/04/2019    Flu vaccine (1) 09/01/2020       Past Surgical History: Past Surgical History:   Procedure Laterality Date    CARDIAC CATHETERIZATION Left 10/25/2018    right radial/[facility]/Dr. Mirta Welch    CHOLECYSTECTOMY  09/20/2018    CHOLECYSTECTOMY LAPAROSCOPIC ROBOTIC (N/A )    COLONOSCOPY  01/11/2017    -polyp(adenomatous polyp)    COLONOSCOPY  12/10/2019    -normal    COLONOSCOPY N/A 12/10/2019    COLORECTAL CANCER SCREENING, HIGH RISK performed by Capri Orellana MD at [address] Bilateral 2007    PAIN MANAGEMENT PROCEDURE N/A 3/24/2020    EPIDURAL STEROID INJECTION-CERVICAL performed by Garret Gonsalves MD at [address] [facility] [address] OFFICE/OUTPT [address] N/A 9/20/2018    CHOLECYSTECTOMY LAPAROSCOPIC ROBOTIC performed by Arza Daniels DO at [address] Left 2009    TUBAL LIGATION  1993        Medications:       Prior to Admission medications    Medication Sig Start Date End Date Taking? Authorizing Provider   amphetamine-dextroamphetamine (ADDERALL XR) 30 MG extended release capsule Take one tablet by mouth in the morning and one tablet by mouth at noon daily. 9/29/20 10/30/20 Yes WILMER Marquez CNP   conjugated estrogens (PREMARIN) 0.625 MG/GM vaginal cream Place vaginally daily x2 weeks. Then place vaginally two times per week. 9/29/20  Yes WILMER Marquez CNP   fluocinonide (LIDEX) 0.05 % cream Apply topically 2 times daily. 9/29/20  Yes WILMER Marquez CNP   Misc Natural Products (Evorn Ralphs COMPLEX/MSM PO) Take by mouth daily   Yes Historical Provider, MD   BIOTIN PO Take by mouth daily   Yes Historical Provider, MD   clobetasol (TEMOVATE) 0.05 % ointment Apply topically once daily at night x 4 weeks. Then apply topically once every other day x 4 weeks. Apply topically twice a week x 4 weeks.  8/21/20  Yes WILMER Marquez CNP   methenamine (HIPREX) 1 g tablet Take 1 tablet by mouth 4 times daily  Patient taking differently: Take 1 g by mouth 2 times daily  7/13/20  Yes Helen Mcfarland Bina Mondragon MD   dilTIAZem (CARDIZEM CD) 360 MG extended release capsule TAKE 1 CAPSULE BY MOUTH ONCE DAILY 5/29/20  Yes Waqar Amaya MD   atorvastatin (LIPITOR) 10 MG tablet TAKE 1 TABLET BY MOUTH ONCE DAILY 4/3/20  Yes Waqar Amaya MD   enalapril (VASOTEC) 2.5 MG tablet TAKE 1 TABLET BY MOUTH ONCE DAILY 4/3/20  Yes Waqar Amaya MD   Bacillus Coagulans-Inulin (ALIGN PREBIOTIC-PROBIOTIC PO) Take 1 tablet by mouth daily   Yes Historical Provider, MD   insulin aspart (NOVOLOG) 100 UNIT/ML injection vial 100 units qd per pump 10/23/18  Yes Waqar Amaya MD   Psyllium (METAMUCIL PO) Take by mouth daily   Yes Historical Provider, MD   acetaminophen (TYLENOL) 500 MG tablet Take 2 tablets by mouth every 6 hours as needed for Pain or Fever 9/25/18  Yes Declan Winkler PA-C   EPINEPHrine (EPIPEN 2-RAS) 0.3 MG/0.3ML SOAJ injection Inject 0.3 mLs into the muscle once for 1 dose Use as directed for allergic reaction 5/5/20 5/5/20  Waqar Amaya MD        Allergies:       Bee venom and Sulfa antibiotics    Social History:     Tobacco:    reports that she quit smoking about 20 years ago. She has a 1.25 pack-year smoking history. She has never used smokeless tobacco.  Alcohol:      reports current alcohol use. Drug Use:  reports no history of drug use. Family History:     Family History   Problem Relation Age of Onset    Heart Attack Brother 48       Review of Systems:     Positive and Negative as described in HPI    Review of Systems   Constitutional: Positive for fatigue. Negative for chills, fever and unexpected weight change. HENT: Negative for congestion, rhinorrhea, sinus pressure, sinus pain and sore throat. Eyes: Negative for pain and itching. Respiratory: Negative for cough, chest tightness, shortness of breath and wheezing. Cardiovascular: Negative for chest pain, palpitations and leg swelling. Gastrointestinal: Negative for abdominal pain, constipation and diarrhea.    Genitourinary: Negative for difficulty urinating, frequency, urgency, vaginal bleeding, vaginal discharge and vaginal pain. Musculoskeletal: Positive for joint swelling (knuckles). Negative for arthralgias and myalgias. Skin: Positive for rash (Admits intermittent poison ivy rash that she gets from her dog. She uses flucinonide topical with relief.). Neurological: Negative for dizziness, light-headedness and headaches. Physical Exam:   Vitals:  /80   Ht 5' 4\" (1.626 m)   Wt 154 lb (69.9 kg)   BMI 26.43 kg/m²     Physical Exam  Exam conducted with a chaperone present. Constitutional:       General: She is not in acute distress. Appearance: Normal appearance. She is normal weight. She is not ill-appearing. HENT:      Head: Normocephalic. Right Ear: Tympanic membrane, ear canal and external ear normal. There is no impacted cerumen. Left Ear: Tympanic membrane, ear canal and external ear normal. There is no impacted cerumen. Nose: Nose normal. No congestion or rhinorrhea. Mouth/Throat:      Mouth: Mucous membranes are moist.      Pharynx: No oropharyngeal exudate or posterior oropharyngeal erythema. Eyes:      General:         Right eye: No discharge. Left eye: No discharge. Extraocular Movements: Extraocular movements intact. Conjunctiva/sclera: Conjunctivae normal.      Pupils: Pupils are equal, round, and reactive to light. Neck:      Musculoskeletal: Neck supple. Cardiovascular:      Rate and Rhythm: Normal rate and regular rhythm. Pulses: Normal pulses. Heart sounds: Normal heart sounds. No murmur. Pulmonary:      Effort: Pulmonary effort is normal. No respiratory distress. Breath sounds: Normal breath sounds. No wheezing or rales. Abdominal:      General: Abdomen is flat. Bowel sounds are normal. There is no distension. Palpations: Abdomen is soft. There is no mass. Tenderness: There is no abdominal tenderness. There is no guarding. Component Value Date    TSH 1.80 10/24/2017     Lab Results   Component Value Date    CHOL 193 02/20/2017     10/30/2019    LABA1C 6.8 10/30/2019       Assessment/Plan:      Diagnosis Orders   1. Wellness examination     2. Other insomnia     3. Cervical cancer screening  PAP Smear   4. Attention deficit disorder, unspecified hyperactivity presence  amphetamine-dextroamphetamine (ADDERALL XR) 30 MG extended release capsule   5. Diabetic ketoacidosis without coma associated with type 1 diabetes mellitus ([facility] [address]     6. Lichen sclerosus of female genitalia         DM:   - Patient followed by endocrinology in [facility]. Advised against skipping meals to prevent hypoglycemic episodes. Wellness:   - Recommended flu vaccination this fall.   - Pap smear completed today. - Refill for premarin filled today due to increased risk of UTI, postmenopausal.  - Patient stated that she recently had labs done. She will release the records to the office for review. No labs placed today as I will review her labs and make adjustments/add labs as necessary. Insomnia:   - Discussed various options for insomnia related to anxiety. The patient is interested in trying trazodone as she was on this in the past and it was successful. 50mg QHS prescribed. ADD:   - PDMP reviewed. No evidence of suspected medication abuse. Adderall prescription placed. Patient's symptoms stable. Lichen Sclerosis:   - Patient's symptoms significantly improving. Patient to continue clobetasol ointment. Completed Refills   Requested Prescriptions     Signed Prescriptions Disp Refills    amphetamine-dextroamphetamine (ADDERALL XR) 30 MG extended release capsule 60 capsule 0     Sig: Take one tablet by mouth in the morning and one tablet by mouth at noon daily.  conjugated estrogens (PREMARIN) 0.625 MG/GM vaginal cream 1 Tube 1     Sig: Place vaginally daily x2 weeks. Then place vaginally two times per week.     fluocinonide (LIDEX) 0.05 Mark Castro     Electronically signed by WILMER Padilla CNP on 09/30/20 at 6:01 AM.

## 2020-09-30 LAB
AVERAGE GLUCOSE: NORMAL
HBA1C MFR BLD: 7.4 %

## 2020-10-01 RX ORDER — TRAZODONE HYDROCHLORIDE 50 MG/1
50 TABLET ORAL NIGHTLY PRN
Qty: 60 TABLET | Refills: 1 | Status: SHIPPED | OUTPATIENT
Start: 2020-10-01 | End: 2021-01-19 | Stop reason: SDUPTHER

## 2020-10-06 LAB
HPV SOURCE: NORMAL
HPV, GENOTYPE 16: NOT DETECTED
HPV, GENOTYPE 18: NOT DETECTED
HPV, HIGH RISK OTHER: NOT DETECTED

## 2020-10-07 LAB — CYTOLOGY REPORT: NORMAL

## 2020-10-27 RX ORDER — ENALAPRIL MALEATE 2.5 MG/1
TABLET ORAL
Qty: 30 TABLET | Refills: 5 | Status: SHIPPED | OUTPATIENT
Start: 2020-10-27 | End: 2021-05-26

## 2020-10-27 RX ORDER — ATORVASTATIN CALCIUM 10 MG/1
TABLET, FILM COATED ORAL
Qty: 30 TABLET | Refills: 5 | Status: SHIPPED | OUTPATIENT
Start: 2020-10-27 | End: 2021-05-26

## 2020-11-05 RX ORDER — DEXTROAMPHETAMINE SACCHARATE, AMPHETAMINE ASPARTATE MONOHYDRATE, DEXTROAMPHETAMINE SULFATE AND AMPHETAMINE SULFATE 7.5; 7.5; 7.5; 7.5 MG/1; MG/1; MG/1; MG/1
CAPSULE, EXTENDED RELEASE ORAL
Qty: 60 CAPSULE | Refills: 0 | Status: SHIPPED | OUTPATIENT
Start: 2020-11-05 | End: 2020-12-08 | Stop reason: SDUPTHER

## 2020-12-02 ENCOUNTER — NURSE ONLY (OUTPATIENT)
Dept: FAMILY MEDICINE CLINIC | Age: 60
End: 2020-12-02
Payer: COMMERCIAL

## 2020-12-02 PROCEDURE — 90471 IMMUNIZATION ADMIN: CPT | Performed by: NURSE PRACTITIONER

## 2020-12-02 PROCEDURE — 90694 VACC AIIV4 NO PRSRV 0.5ML IM: CPT | Performed by: NURSE PRACTITIONER

## 2020-12-02 NOTE — PATIENT INSTRUCTIONS
Patient Education        Influenza (Flu) Vaccine (Inactivated or Recombinant): What You Need to Know  Why get vaccinated? Influenza vaccine can prevent influenza (flu). Flu is a contagious disease that spreads around the [place] every year, usually between October and May. Anyone can get the flu, but it is more dangerous for some people. Infants and young children, people 72years of age and older, pregnant women, and people with certain health conditions or a weakened immune system are at greatest risk of flu complications. Pneumonia, bronchitis, sinus infections and ear infections are examples of flu-related complications. If you have a medical condition, such as heart disease, cancer or diabetes, flu can make it worse. Flu can cause fever and chills, sore throat, muscle aches, fatigue, cough, headache, and runny or stuffy nose. Some people may have vomiting and diarrhea, though this is more common in children than adults. Each year, thousands of people in the [facility] die from flu, and many more are hospitalized. Flu vaccine prevents millions of illnesses and flu-related visits to the doctor each year. Influenza vaccine  CDC recommends everyone 10months of age and older get vaccinated every flu season. Children 6 months through 6years of age may need 2 doses during a single flu season. Everyone else needs only 1 dose each flu season. It takes about 2 weeks for protection to develop after vaccination. There are many flu viruses, and they are always changing. Each year a new flu vaccine is made to protect against three or four viruses that are likely to cause disease in the upcoming flu season. Even when the vaccine doesn't exactly match these viruses, it may still provide some protection. Influenza vaccine does not cause flu. Influenza vaccine may be given at the same time as other vaccines.   Talk with your health care provider  Tell your vaccine provider if the person getting the vaccine:  · Has had an allergic reaction after a previous dose of influenza vaccine, or has any severe, life-threatening allergies. · Has ever had Guillain-Barré Syndrome (also called GBS). In some cases, your health care provider may decide to postpone influenza vaccination to a future visit. People with minor illnesses, such as a cold, may be vaccinated. People who are moderately or severely ill should usually wait until they recover before getting influenza vaccine. Your health care provider can give you more information. Risks of a vaccine reaction  · Soreness, redness, and swelling where shot is given, fever, muscle aches, and headache can happen after influenza vaccine. · There may be a very small increased risk of Guillain-Barré Syndrome (GBS) after inactivated influenza vaccine (the flu shot). [facility] children who get the flu shot along with pneumococcal vaccine (PCV13), and/or DTaP vaccine at the same time might be slightly more likely to have a seizure caused by fever. Tell your health care provider if a child who is getting flu vaccine has ever had a seizure. People sometimes faint after medical procedures, including vaccination. Tell your provider if you feel dizzy or have vision changes or ringing in the ears. As with any medicine, there is a very remote chance of a vaccine causing a severe allergic reaction, other serious injury, or death. What if there is a serious problem? An allergic reaction could occur after the vaccinated person leaves the clinic. If you see signs of a severe allergic reaction (hives, swelling of the face and throat, difficulty breathing, a fast heartbeat, dizziness, or weakness), call 9-1-1 and get the person to the nearest hospital.  For other signs that concern you, call your health care provider. Adverse reactions should be reported to the Vaccine Adverse Event Reporting System (VAERS).  Your health care provider will usually file this report, or you can do it yourself. Visit the VAERS website at www.vaers. hhs.gov or call 6-839.921.7417. VAERS is only for reporting reactions, and VAERS staff do not give medical advice. The National Vaccine Injury Compensation Program  The National Vaccine Injury Compensation Program (VICP) is a federal program that was created to compensate people who may have been injured by certain vaccines. Visit the VICP website at www.[facility].gov/vaccinecompensation or call 5-576.584.4525 to learn about the program and about filing a claim. There is a time limit to file a claim for compensation. How can I learn more? · Ask your healthcare provider. · Call your local or state health department. · Contact the Centers for Disease Control and Prevention (CDC):  ? Call 7-745.398.4002 (1-800-CDC-INFO) or  ? Visit CDC's website at www.cdc.gov/flu  Vaccine Information Statement (Interim)  Inactivated Influenza Vaccine  8/15/2019  [address] [id number]  Department of Health and Human Services  Centers for Disease Control and Prevention  Many Vaccine Information Statements are available in [place] and other languages. See www.immunize.org/vis. Muchas hojas de información sobre vacunas están disponibles en español y en otros idiomas. Visite www.immunize.org/vis. Care instructions adapted under license by [facility] ([facility]). If you have questions about a medical condition or this instruction, always ask your healthcare professional. [place] [address] any warranty or liability for your use of this information.

## 2020-12-08 RX ORDER — DEXTROAMPHETAMINE SACCHARATE, AMPHETAMINE ASPARTATE MONOHYDRATE, DEXTROAMPHETAMINE SULFATE AND AMPHETAMINE SULFATE 7.5; 7.5; 7.5; 7.5 MG/1; MG/1; MG/1; MG/1
CAPSULE, EXTENDED RELEASE ORAL
Qty: 60 CAPSULE | Refills: 0 | Status: SHIPPED | OUTPATIENT
Start: 2020-12-08 | End: 2021-01-19 | Stop reason: SDUPTHER

## 2020-12-15 RX ORDER — DILTIAZEM HYDROCHLORIDE 360 MG/1
CAPSULE, EXTENDED RELEASE ORAL
Qty: 90 CAPSULE | Refills: 3 | Status: SHIPPED | OUTPATIENT
Start: 2020-12-15 | End: 2021-08-04 | Stop reason: SDUPTHER

## 2020-12-16 NOTE — TELEPHONE ENCOUNTER
Reviewed cardiology note from 2018 that recommended diltiazem 360mg QD for suspected coronary artery disease. Will refill rx today.

## 2021-01-19 ENCOUNTER — OFFICE VISIT (OUTPATIENT)
Dept: FAMILY MEDICINE CLINIC | Age: 61
End: 2021-01-19
Payer: COMMERCIAL

## 2021-01-19 VITALS
OXYGEN SATURATION: 99 % | WEIGHT: 161 LBS | BODY MASS INDEX: 27.49 KG/M2 | HEIGHT: 64 IN | DIASTOLIC BLOOD PRESSURE: 72 MMHG | SYSTOLIC BLOOD PRESSURE: 120 MMHG | HEART RATE: 68 BPM

## 2021-01-19 DIAGNOSIS — I10 ESSENTIAL HYPERTENSION: ICD-10-CM

## 2021-01-19 DIAGNOSIS — F98.8 ATTENTION DEFICIT DISORDER, UNSPECIFIED HYPERACTIVITY PRESENCE: Primary | ICD-10-CM

## 2021-01-19 DIAGNOSIS — N90.4 LICHEN SCLEROSUS OF FEMALE GENITALIA: ICD-10-CM

## 2021-01-19 DIAGNOSIS — E10.69 TYPE 1 DIABETES MELLITUS WITH OTHER SPECIFIED COMPLICATION (HCC): ICD-10-CM

## 2021-01-19 DIAGNOSIS — F51.01 PRIMARY INSOMNIA: ICD-10-CM

## 2021-01-19 PROCEDURE — 99214 OFFICE O/P EST MOD 30 MIN: CPT | Performed by: NURSE PRACTITIONER

## 2021-01-19 RX ORDER — TRAZODONE HYDROCHLORIDE 50 MG/1
50 TABLET ORAL NIGHTLY PRN
Qty: 60 TABLET | Refills: 1 | Status: SHIPPED | OUTPATIENT
Start: 2021-01-19 | End: 2022-01-20 | Stop reason: ALTCHOICE

## 2021-01-19 RX ORDER — DEXTROAMPHETAMINE SACCHARATE, AMPHETAMINE ASPARTATE MONOHYDRATE, DEXTROAMPHETAMINE SULFATE AND AMPHETAMINE SULFATE 7.5; 7.5; 7.5; 7.5 MG/1; MG/1; MG/1; MG/1
CAPSULE, EXTENDED RELEASE ORAL
Qty: 60 CAPSULE | Refills: 0 | Status: SHIPPED | OUTPATIENT
Start: 2021-01-19 | End: 2021-04-21 | Stop reason: SDUPTHER

## 2021-01-19 ASSESSMENT — ENCOUNTER SYMPTOMS
ABDOMINAL PAIN: 0
COUGH: 0
SHORTNESS OF BREATH: 0

## 2021-01-19 ASSESSMENT — PATIENT HEALTH QUESTIONNAIRE - PHQ9
SUM OF ALL RESPONSES TO PHQ9 QUESTIONS 1 & 2: 0
SUM OF ALL RESPONSES TO PHQ QUESTIONS 1-9: 0

## 2021-01-19 NOTE — PROGRESS NOTES
Name: Willy Durán  : 1960         Chief Complaint:     Chief Complaint   Patient presents with    Follow-up     Patient states everything is going well other than having red itchy eyes. History of Present Illness:      Willy Durán is a 62 y.o.  female who presents with Follow-up (Patient states everything is going well other than having red itchy eyes. )      HPI     ADD:   Current medication regimen includes Adderall 30mg twice daily. She takes her second dose at 1 PM.  She admits the medication is working well and she has no complaints. She states that her focus is \"excellent\". She admits sleeping well. She denies chest pain or palpitations. Insomnia:  Current medication regimen includes melatonin 10 mg nightly and trazodone 50 mg 34 times weekly. She states that her quality of sleep has improved. She notes decreased stress levels. DM Type I:   Patient is currently using an insulin pump and being followed by endocrinology. Her upcoming appointment is next month. She states that she had several hypoglycemic episodes and is titrating her insulin with endocrinology weekly. She admits experiencing less hypoglycemic episodes since. She admits recent eye surgery, she was last seen by ophthalmology last week. She admits a foot exam fall . She denies numbness or tingling in her hands and feet. She denies sores that are slow to heal.  She denies polyuria, polydipsia, or polyphagia. She admits fasting glucose averaging 120. She reports that her most recent A1c is 7.1. HTN:   Current medication regimen includes diltiazem 360 mg and enalapril 2.5 mg. She is not monitoring her blood pressure at home. She is not monitoring her sodium intake. She admits a well-balanced diet. She is exercising with treadmill and free weights daily.     Lichen Sclerosis: The patient admits no concerns with this. She has stopped using the steroid cream (clobetasol) as her symptoms have resolved. She admits mild vaginal dryness. Past Medical History:     Past Medical History:   Diagnosis Date    Diabetes mellitus ([address]     H/O cardiovascular stress test 11/14/2017    Largerly normal moyocardial perfusion imaging with a soft tissue artifact, but without evidence of significant myocardial ischemia or infarction. EF 66%. The pt Flores treadmill score is 4 which correlates with a low/intermiediate risk for CAD.  History of Holter monitoring 02/16/2018    Very frequent PVC's. No symptoms were reported. Very frequent premature ventricular ectopic beats total 5610 consisting of 5610 isolated PVC's.  History of Holter monitoring 02/15/2018    Very frequent PVC's. No symptoms were reported. Consider additional testing and/or treatment if clinically indicated.      Hypertension     PONV (postoperative nausea and vomiting)       Reviewed all health maintenance requirements and ordered appropriate tests  Health Maintenance Due   Topic Date Due    Hepatitis C screen  1960    Diabetic foot exam  09/01/1971    HIV screen  09/01/1976    Hepatitis B vaccine (1 of 3 - Risk 3-dose series) 09/01/1980    Shingles Vaccine (2 of 2) 12/04/2019    A1C test (Diabetic or Prediabetic)  10/30/2020    Diabetic microalbuminuria test  10/30/2020    Lipid screen  10/30/2020    Creatinine monitoring  10/30/2020    Potassium monitoring  11/01/2020       Past Surgical History:     Past Surgical History:   Procedure Laterality Date    CARDIAC CATHETERIZATION Left 10/25/2018    right radial/[facility]    CHOLECYSTECTOMY  09/20/2018    CHOLECYSTECTOMY LAPAROSCOPIC ROBOTIC (N/A )    COLONOSCOPY  01/11/2017    -polyp(adenomatous polyp)    COLONOSCOPY  12/10/2019    -normal    COLONOSCOPY N/A 12/10/2019 COLORECTAL CANCER SCREENING, HIGH RISK performed by Harmony Ashraf MD at [address]  Bilateral 2007    PAIN MANAGEMENT PROCEDURE N/A 3/24/2020    EPIDURAL STEROID INJECTION-CERVICAL performed by Choco Riddle MD at [address] [facility] [address] OFF[address]PT [address] [facility] N/A 9/20/2018    CHOLECYSTECTOMY LAPAROSCOPIC ROBOTIC performed by Fortunato Domingo DO at [facility] 2009    TUBAL LIGATION  1993        Medications:       Prior to Admission medications    Medication Sig Start Date End Date Taking? Authorizing Provider   amphetamine-dextroamphetamine (ADDERALL XR) 30 MG extended release capsule Take one tablet by mouth in the morning and one tablet by mouth at noon daily. 1/19/21 2/19/21 Yes Ivonne GEE RomeoN - DELMY   traZODone (DESYREL) 50 MG tablet Take 1 tablet by mouth nightly as needed for Sleep 1/19/21  Yes Ivonne Agueda APRN - DELMY   dilTIAZem (CARDIZEM CD) 360 MG extended release capsule TAKE 1 CAPSULE BY MOUTH ONCE DAILY 12/15/20  Yes IvonneWILMER Trivedi - CNP   insulin aspart (NOVOLOG) 100 UNIT/ML injection vial INJECT 100 UNITS DAILY PER PUMP 12/3/20  Yes Wang Dumont MD   enalapril (VASOTEC) 2.5 MG tablet TAKE 1 TABLET BY MOUTH ONCE DAILY 10/27/20  Yes Wang Dumont MD   atorvastatin (LIPITOR) 10 MG tablet TAKE 1 TABLET BY MOUTH ONCE DAILY 10/27/20  Yes Wang Dumont MD   conjugated estrogens (PREMARIN) 0.625 MG/GM vaginal cream Place vaginally daily x2 weeks. Then place vaginally two times per week. 9/29/20  Yes Ivonne Agueda APRN - CNP   fluocinonide (LIDEX) 0.05 % cream Apply topically 2 times daily.  9/29/20  Yes Ivonne Agueda APRN - CNP   Misc Natural Products ([place] COMPLEX/MSM PO) Take by mouth daily   Yes Historical Provider, MD   BIOTIN PO Take by mouth daily   Yes Historical Provider, MD clobetasol (TEMOVATE) 0.05 % ointment Apply topically once daily at night x 4 weeks. Then apply topically once every other day x 4 weeks. Apply topically twice a week x 4 weeks. 8/21/20  Yes WILMER Byers CNP   methenamine (HIPREX) 1 g tablet Take 1 tablet by mouth 4 times daily  Patient taking differently: Take 1 g by mouth 2 times daily  7/13/20  Yes Shruti Mendoza MD   Bacillus Coagulans-Inulin (ALIGN PREBIOTIC-PROBIOTIC PO) Take 1 tablet by mouth daily   Yes Historical Provider, MD   Psyllium (METAMUCIL PO) Take by mouth daily   Yes Historical Provider, MD   acetaminophen (TYLENOL) 500 MG tablet Take 2 tablets by mouth every 6 hours as needed for Pain or Fever 9/25/18  Yes Declan Winkler PA-C   EPINEPHrine (EPIPEN 2-RAS) 0.3 MG/0.3ML SOAJ injection Inject 0.3 mLs into the muscle once for 1 dose Use as directed for allergic reaction 5/5/20 5/5/20  Shruti Mendoza MD        Allergies:       Bee venom and Sulfa antibiotics    Social History:     Tobacco:    reports that she quit smoking about 21 years ago. She has a 1.25 pack-year smoking history. She has never used smokeless tobacco.  Alcohol:      reports current alcohol use. Drug Use:  reports no history of drug use. Family History:     Family History   Problem Relation Age of Onset    Heart Attack Brother 48       Review of Systems:     Positive and Negative as described in HPI    Review of Systems   Constitutional: Negative for chills and fever. Eyes: Negative for visual disturbance. The patient admits bilateral eye redness, puffiness, and itching that began several days ago. She denies drainage, crusting, or matting. She states that the eyes do feel \"a little gritty\". She denies seasonal allergies. Denies excessive tearing. Denies vision changes. Respiratory: Negative for cough and shortness of breath. Cardiovascular: Negative for chest pain and palpitations. Gastrointestinal: Negative for abdominal pain. Neurological: Negative for dizziness, light-headedness and headaches. Physical Exam:   Vitals:  /72   Pulse 68   Ht 5' 4\" (1.626 m)   Wt 161 lb (73 kg)   SpO2 99%   BMI 27.64 kg/m²     Physical Exam  Constitutional:       General: She is not in acute distress. Appearance: Normal appearance. She is normal weight. She is not ill-appearing or toxic-appearing. HENT:      Head: Normocephalic. Right Ear: Tympanic membrane, ear canal and external ear normal. There is no impacted cerumen. Left Ear: Tympanic membrane, ear canal and external ear normal. There is no impacted cerumen. Nose: Nose normal. No congestion or rhinorrhea. Mouth/Throat:      Mouth: Mucous membranes are moist.      Pharynx: No oropharyngeal exudate or posterior oropharyngeal erythema. Eyes:      General:         Right eye: No discharge. Left eye: No discharge. Extraocular Movements: Extraocular movements intact. Conjunctiva/sclera: Conjunctivae normal.      Pupils: Pupils are equal, round, and reactive to light. Neck:      Musculoskeletal: Neck supple. Cardiovascular:      Rate and Rhythm: Normal rate and regular rhythm. Heart sounds: Normal heart sounds. No murmur. Pulmonary:      Effort: Pulmonary effort is normal. No respiratory distress. Breath sounds: Normal breath sounds. No stridor. No wheezing, rhonchi or rales. Abdominal:      General: Abdomen is flat. Bowel sounds are normal. There is no distension. Palpations: Abdomen is soft. There is no mass. Tenderness: There is no abdominal tenderness. There is no guarding. Hernia: No hernia is present. Lymphadenopathy:      Cervical: No cervical adenopathy. Skin:     General: Skin is warm. Neurological:      Mental Status: She is alert and oriented to person, place, and time.    Psychiatric:         Mood and Affect: Mood normal.         Behavior: Behavior normal. Thought Content: Thought content normal.         Judgment: Judgment normal.         Data:     Lab Results   Component Value Date    .0 10/30/2019    K 4.6 11/01/2019    .0 10/30/2019    CO2 29.0 10/30/2019    BUN 19.0 10/30/2019    CREATININE 0.9 10/30/2019    GLUCOSE 205.0 10/30/2019    PROT 6.1 09/25/2018    LABALBU 4.2 10/30/2019    BILITOT 0.60 10/30/2019    ALKPHOS 80.0 10/30/2019    AST 26.0 10/30/2019    ALT 37.0 10/30/2019     Lab Results   Component Value Date    WBC 7.0 10/24/2018    RBC 4.30 10/24/2018    HGB 13.6 10/24/2018    HCT 41.7 10/24/2018    MCV 97.0 10/24/2018    MCH 31.6 10/24/2018    MCHC 32.6 10/24/2018    RDW 12.5 10/24/2018     10/24/2018    MPV 9.1 10/24/2018     Lab Results   Component Value Date    TSH 1.80 10/24/2017     Lab Results   Component Value Date    CHOL 193 02/20/2017     10/30/2019    LABA1C 6.8 10/30/2019       Assessment/Plan:      Diagnosis Orders   1. Type 1 diabetes mellitus with other specified complication ([facility] [address]     2. Attention deficit disorder, unspecified hyperactivity presence  amphetamine-dextroamphetamine (ADDERALL XR) 30 MG extended release capsule   3. Lichen sclerosus of female genitalia     4. Essential hypertension     5. Primary insomnia         DM:   Requested records from endocrinology. Continue follow-up with endocrinology with titration of insulin as needed. Eye exam and foot exam up-to-date, per patient. Hypertension:  Reviewed at length well-balanced diet and routine physical activity. Recommended reduction of sodium intake. ADD:  Symptoms stable. PDMP reviewed. Adderall refilled today.   Follow-up 3 months for med check    Wellness:  Recommended shingles vaccination Recommended completion of labs. Patient states that she had labs done recently with her endocrinologist and would like to send these over prior to drawing labs to avoid repeat. I am agreeable to this plan and will wait to review her records and request additional labs as necessary. Recommended oral antihistamine and over-the-counter moisturizing eyedrops as her symptoms sound similar to allergies or irritation. I do not suspect infection d/t normal physical exam today and history findings. Completed Refills   Requested Prescriptions     Signed Prescriptions Disp Refills    amphetamine-dextroamphetamine (ADDERALL XR) 30 MG extended release capsule 60 capsule 0     Sig: Take one tablet by mouth in the morning and one tablet by mouth at noon daily.  traZODone (DESYREL) 50 MG tablet 60 tablet 1     Sig: Take 1 tablet by mouth nightly as needed for Sleep       No orders of the defined types were placed in this encounter. No results found for this visit on 01/19/21. Return in about 3 months (around 4/19/2021), or if symptoms worsen or fail to improve, for ADD reginock.     Electronically signed by WILMER Solares CNP on 01/19/21 at 10:35 AM.

## 2021-02-13 DIAGNOSIS — E10.69 TYPE 1 DIABETES MELLITUS WITH OTHER SPECIFIED COMPLICATION (HCC): ICD-10-CM

## 2021-04-20 NOTE — PROGRESS NOTES
Name: Alma Al  : 1960         Chief Complaint:     Chief Complaint   Patient presents with    Follow-up     Denies any issues. History of Present Illness:      Alma Al is a 62 y.o.  female who presents with Follow-up (Denies any issues. )      HPI     The patient admits difficulty losing weight and fatigue. She has been exercising less over the past several months and has had a nine pound increase since 2020. She also received a steroid injection into the cervical spine by Dr. Rakel Don (pain management) last week and states this has not improved her symptoms. She continues to use a heating pad and lidocaine patch with some benefit. She continues to follow with endocrinology for DM Type 1. ADD:   The patient presents for 3 month ADD check. Current medications include Adderall XR 30mg BID (once in the morning and once at noon). She denies ADD symptoms. She denies concerns with focus or distractibility. She denies adderall side effects. She admits monitoring her blood pressure at home and this averages 120/80. She denies insomnia. She is sleeping 6 hours per night. Past Medical History:     Past Medical History:   Diagnosis Date    Diabetes mellitus ([facility] [address]     H/O cardiovascular stress test 2017    Largerly normal moyocardial perfusion imaging with a soft tissue artifact, but without evidence of significant myocardial ischemia or infarction. EF 66%. The pt Flores treadmill score is 4 which correlates with a low/intermiediate risk for CAD.  History of Holter monitoring 2018    Very frequent PVC's. No symptoms were reported. Very frequent premature ventricular ectopic beats total 5610 consisting of 5610 isolated PVC's.  History of Holter monitoring 02/15/2018    Very frequent PVC's. No symptoms were reported. Consider additional testing and/or treatment if clinically indicated.      Hypertension     PONV (postoperative nausea and vomiting)       Reviewed all health maintenance requirements and ordered appropriate tests  Health Maintenance Due   Topic Date Due    Diabetic foot exam  Never done    Diabetic microalbuminuria test  10/30/2020    Lipid screen  10/30/2020    Creatinine monitoring  10/30/2020    Potassium monitoring  11/01/2020       Past Surgical History:     Past Surgical History:   Procedure Laterality Date    CARDIAC CATHETERIZATION Left 10/25/2018    right radial/MHT/Dr. Demetris Cowan    CHOLECYSTECTOMY  09/20/2018    CHOLECYSTECTOMY LAPAROSCOPIC ROBOTIC (N/A )    COLONOSCOPY  01/11/2017    -polyp(adenomatous polyp)    COLONOSCOPY  12/10/2019    -normal    COLONOSCOPY N/A 12/10/2019    COLORECTAL CANCER SCREENING, HIGH RISK performed by Carlota Viramontes MD at [address] Bilateral 2007    PAIN MANAGEMENT PROCEDURE N/A 3/24/2020    EPIDURAL STEROID INJECTION-CERVICAL performed by Omie Brunner, MD at [address] OFF[address] [facility] [address] N/A 9/20/2018    CHOLECYSTECTOMY LAPAROSCOPIC ROBOTIC performed by Agustín Carrera DO at [address] [facility] Left 2009    TUBAL LIGATION  1993        Medications:       Prior to Admission medications    Medication Sig Start Date End Date Taking? Authorizing Provider   amphetamine-dextroamphetamine (ADDERALL XR) 30 MG extended release capsule Take one tablet by mouth in the morning and one tablet by mouth at noon daily.  4/21/21 5/22/21 Yes WILMER Cee CNP   insulin aspart (NOVOLOG) 100 UNIT/ML injection vial INJECT 100 UNITS DAILY PER PUMP 2/15/21  Yes Alivia Pandey MD   traZODone (DESYREL) 50 MG tablet Take 1 tablet by mouth nightly as needed for Sleep 1/19/21  Yes WILMER Cee CNP   dilTIAZem (CARDIZEM CD) 360 MG extended release capsule TAKE 1 CAPSULE BY MOUTH ONCE DAILY 12/15/20  Yes WILMER Cee CNP   enalapril (VASOTEC) 2.5 MG tablet TAKE 1 TABLET BY MOUTH ONCE DAILY 10/27/20  Yes Alivia Pandey MD   atorvastatin (LIPITOR) 10 MG tablet TAKE 1 TABLET BY MOUTH ONCE DAILY 10/27/20  Yes Shauna Spicer MD   conjugated estrogens (PREMARIN) 0.625 MG/GM vaginal cream Place vaginally daily x2 weeks. Then place vaginally two times per week. 9/29/20  Yes WILMER Gunter CNP   fluocinonide (LIDEX) 0.05 % cream Apply topically 2 times daily. 9/29/20  Yes WILMER Gunter CNP   Misc Natural Products ([place] COMPLEX/MSM PO) Take by mouth daily   Yes Historical Provider, MD   BIOTIN PO Take by mouth daily   Yes Historical Provider, MD   clobetasol (TEMOVATE) 0.05 % ointment Apply topically once daily at night x 4 weeks. Then apply topically once every other day x 4 weeks. Apply topically twice a week x 4 weeks. 8/21/20  Yes WILMER Gunter CNP   methenamine (HIPREX) 1 g tablet Take 1 tablet by mouth 4 times daily  Patient taking differently: Take 1 g by mouth 2 times daily  7/13/20  Yes Jethro Olmos MD   Bacillus Coagulans-Inulin (ALIGN PREBIOTIC-PROBIOTIC PO) Take 1 tablet by mouth daily   Yes Historical Provider, MD   Psyllium (METAMUCIL PO) Take by mouth daily   Yes Historical Provider, MD   acetaminophen (TYLENOL) 500 MG tablet Take 2 tablets by mouth every 6 hours as needed for Pain or Fever 9/25/18  Yes Teri Winkler PA-C   EPINEPHrine (EPIPEN 2-RAS) 0.3 MG/0.3ML SOAJ injection Inject 0.3 mLs into the muscle once for 1 dose Use as directed for allergic reaction 5/5/20 5/5/20  Jethro Olmos MD        Allergies:       Bee venom and Sulfa antibiotics    Social History:     Tobacco:    reports that she quit smoking about 21 years ago. She has a 1.25 pack-year smoking history. She has never used smokeless tobacco.  Alcohol:      reports current alcohol use. Drug Use:  reports no history of drug use.     Family History:     Family History   Problem Relation Age of Onset    Heart Attack Brother 48       Review of Systems:     Positive and Negative as described in HPI    Review of Systems   Constitutional: Positive for fatigue. Psychiatric/Behavioral: Negative for decreased concentration and sleep disturbance. Physical Exam:   Vitals:  /80   Pulse 65   Ht 5' 4\" (1.626 m)   Wt 162 lb (73.5 kg)   SpO2 98%   BMI 27.81 kg/m²     Physical Exam  Constitutional:       General: She is not in acute distress. Appearance: Normal appearance. She is normal weight. She is not ill-appearing or toxic-appearing. HENT:      Head: Normocephalic. Right Ear: Tympanic membrane, ear canal and external ear normal. There is no impacted cerumen. Left Ear: Tympanic membrane, ear canal and external ear normal. There is no impacted cerumen. Mouth/Throat:      Mouth: Mucous membranes are moist.      Pharynx: No oropharyngeal exudate or posterior oropharyngeal erythema. Cardiovascular:      Rate and Rhythm: Normal rate and regular rhythm. Heart sounds: Normal heart sounds. No murmur. Pulmonary:      Effort: Pulmonary effort is normal. No respiratory distress. Breath sounds: Normal breath sounds. No stridor. No wheezing, rhonchi or rales. Skin:     General: Skin is warm. Neurological:      Mental Status: She is alert and oriented to person, place, and time. Psychiatric:         Mood and Affect: Mood normal.         Behavior: Behavior normal.         Thought Content:  Thought content normal.         Judgment: Judgment normal.         Data:     Lab Results   Component Value Date    .0 10/30/2019    K 4.6 11/01/2019    .0 10/30/2019    CO2 29.0 10/30/2019    BUN 19.0 10/30/2019    CREATININE 0.9 10/30/2019    GLUCOSE 205.0 10/30/2019    PROT 6.1 09/25/2018    LABALBU 4.2 10/30/2019    BILITOT 0.60 10/30/2019    ALKPHOS 80.0 10/30/2019    AST 26.0 10/30/2019    ALT 37.0 10/30/2019     Lab Results   Component Value Date    WBC 7.0 10/24/2018    RBC 4.30 10/24/2018    HGB 13.6 10/24/2018    HCT 41.7 10/24/2018    MCV 97.0 10/24/2018    MCH 31.6 10/24/2018    MCHC 32.6 10/24/2018    RDW 12.5 10/24/2018     10/24/2018    MPV 9.1 10/24/2018     Lab Results   Component Value Date    TSH 1.80 10/24/2017     Lab Results   Component Value Date    CHOL 193 02/20/2017     10/30/2019    LABA1C 7.4 09/30/2020       Assessment/Plan:      Diagnosis Orders   1. Attention deficit disorder, unspecified hyperactivity presence  amphetamine-dextroamphetamine (ADDERALL XR) 30 MG extended release capsule   2. Essential hypertension  ALT    AST    Basic Metabolic Panel    CBC   3. Screening cholesterol level  Lipid Panel   4. Unable to lose weight  TSH without Reflex    T4, Free   5. Other fatigue  TSH without Reflex    T4, Free   6. Chronic neck pain         ADD:   -Stable, doing well with Adderall 30mg ER BID  -PDMP reviewed. Refill placed. Wellness:  -Complete lipid, CBC, BMP, ALT, AST  -A1c monitored closely by endocrinology.   -Assess thyroid function for symptoms of weight gain and fatigue    Neck Pain:   -Following with pain management, Dr. Derw Almendarez   -Referral to PT Rodrigo Zhou placed today   -Notify office if symptoms worsen or persist  -Continue conservative measures    Completed Refills   Requested Prescriptions     Signed Prescriptions Disp Refills    amphetamine-dextroamphetamine (ADDERALL XR) 30 MG extended release capsule 60 capsule 0     Sig: Take one tablet by mouth in the morning and one tablet by mouth at noon daily.        Orders Placed This Encounter   Procedures    ALT     Standing Status:   Future     Standing Expiration Date:   4/21/2022    AST     Standing Status:   Future     Standing Expiration Date:   4/21/2022    Basic Metabolic Panel     Standing Status:   Future     Standing Expiration Date:   4/21/2022    CBC     Standing Status:   Future     Standing Expiration Date:   4/21/2022    Lipid Panel     Standing Status:   Future     Standing Expiration Date:   4/21/2022     Order Specific Question:   Is Patient Fasting?/# of Hours     Answer:   fasting    TSH without Reflex Standing Status:   Future     Standing Expiration Date:   4/21/2022    T4, Free     Standing Status:   Future     Standing Expiration Date:   4/21/2022        No results found for this visit on 04/21/21. Return in about 3 months (around 7/21/2021), or if symptoms worsen or fail to improve, for 40 min visit .     Electronically signed by WILMER Crawford CNP on 04/21/21 at 8:55 AM.

## 2021-04-21 ENCOUNTER — OFFICE VISIT (OUTPATIENT)
Dept: FAMILY MEDICINE CLINIC | Age: 61
End: 2021-04-21
Payer: COMMERCIAL

## 2021-04-21 VITALS
OXYGEN SATURATION: 98 % | HEIGHT: 64 IN | WEIGHT: 162 LBS | SYSTOLIC BLOOD PRESSURE: 122 MMHG | BODY MASS INDEX: 27.66 KG/M2 | HEART RATE: 65 BPM | DIASTOLIC BLOOD PRESSURE: 80 MMHG

## 2021-04-21 DIAGNOSIS — G89.29 CHRONIC NECK PAIN: ICD-10-CM

## 2021-04-21 DIAGNOSIS — Z13.220 SCREENING CHOLESTEROL LEVEL: ICD-10-CM

## 2021-04-21 DIAGNOSIS — M54.2 CHRONIC NECK PAIN: ICD-10-CM

## 2021-04-21 DIAGNOSIS — R53.83 OTHER FATIGUE: ICD-10-CM

## 2021-04-21 DIAGNOSIS — I10 ESSENTIAL HYPERTENSION: ICD-10-CM

## 2021-04-21 DIAGNOSIS — R63.8 UNABLE TO LOSE WEIGHT: ICD-10-CM

## 2021-04-21 DIAGNOSIS — F98.8 ATTENTION DEFICIT DISORDER, UNSPECIFIED HYPERACTIVITY PRESENCE: Primary | ICD-10-CM

## 2021-04-21 PROCEDURE — 99214 OFFICE O/P EST MOD 30 MIN: CPT | Performed by: NURSE PRACTITIONER

## 2021-04-21 RX ORDER — DEXTROAMPHETAMINE SACCHARATE, AMPHETAMINE ASPARTATE MONOHYDRATE, DEXTROAMPHETAMINE SULFATE AND AMPHETAMINE SULFATE 7.5; 7.5; 7.5; 7.5 MG/1; MG/1; MG/1; MG/1
CAPSULE, EXTENDED RELEASE ORAL
Qty: 60 CAPSULE | Refills: 0 | Status: SHIPPED | OUTPATIENT
Start: 2021-04-21 | End: 2021-05-26

## 2021-04-21 NOTE — PATIENT INSTRUCTIONS
SURVEY:    You may be receiving a survey from [employer] regarding your visit today. Please complete the survey to enable us to provide the highest quality of care to you and your family. If you cannot score us a very good on any question, please call the office to discuss how we could of made your experience a very good one. Thank you.

## 2021-04-29 ENCOUNTER — TELEPHONE (OUTPATIENT)
Dept: FAMILY MEDICINE CLINIC | Age: 61
End: 2021-04-29

## 2021-05-06 ENCOUNTER — HOSPITAL ENCOUNTER (OUTPATIENT)
Age: 61
Discharge: HOME | End: 2021-05-06
Payer: COMMERCIAL

## 2021-05-06 DIAGNOSIS — Z13.220 SCREENING CHOLESTEROL LEVEL: ICD-10-CM

## 2021-05-06 DIAGNOSIS — I10 ESSENTIAL HYPERTENSION: ICD-10-CM

## 2021-05-06 DIAGNOSIS — R53.83 OTHER FATIGUE: ICD-10-CM

## 2021-05-06 DIAGNOSIS — R63.8 UNABLE TO LOSE WEIGHT: ICD-10-CM

## 2021-05-06 LAB
ALT SERPL-CCNC: 47 U/L (ref 5–33)
ANION GAP SERPL CALCULATED.3IONS-SCNC: 8 MMOL/L (ref 9–17)
AST SERPL-CCNC: 48 U/L
BUN BLDV-MCNC: 20 MG/DL (ref 6–20)
BUN/CREAT BLD: 23 (ref 9–20)
CALCIUM SERPL-MCNC: 9.4 MG/DL (ref 8.6–10.4)
CHLORIDE BLD-SCNC: 107 MMOL/L (ref 98–107)
CHOLESTEROL/HDL RATIO: 1.4
CHOLESTEROL: 157 MG/DL
CO2: 28 MMOL/L (ref 20–31)
CREAT SERPL-MCNC: 0.87 MG/DL (ref 0.5–0.9)
GFR AFRICAN AMERICAN: >60 ML/MIN
GFR NON-AFRICAN AMERICAN: >60 ML/MIN
GFR SERPL CREATININE-BSD FRML MDRD: ABNORMAL ML/MIN/{1.73_M2}
GFR SERPL CREATININE-BSD FRML MDRD: ABNORMAL ML/MIN/{1.73_M2}
GLUCOSE BLD-MCNC: 118 MG/DL (ref 70–99)
HCT VFR BLD CALC: 40.3 % (ref 36.3–47.1)
HDLC SERPL-MCNC: 109 MG/DL
HEMOGLOBIN: 13.3 G/DL (ref 11.9–15.1)
LDL CHOLESTEROL: 37 MG/DL (ref 0–130)
MCH RBC QN AUTO: 32.1 PG (ref 25.2–33.5)
MCHC RBC AUTO-ENTMCNC: 33 G/DL (ref 28.4–34.8)
MCV RBC AUTO: 97.3 FL (ref 82.6–102.9)
NRBC AUTOMATED: 0 PER 100 WBC
PDW BLD-RTO: 13 % (ref 11.8–14.4)
PLATELET # BLD: 290 K/UL (ref 138–453)
PMV BLD AUTO: 9.1 FL (ref 8.1–13.5)
POTASSIUM SERPL-SCNC: 4.8 MMOL/L (ref 3.7–5.3)
RBC # BLD: 4.14 M/UL (ref 3.95–5.11)
SODIUM BLD-SCNC: 143 MMOL/L (ref 135–144)
THYROXINE, FREE: 1.18 NG/DL (ref 0.93–1.7)
TRIGL SERPL-MCNC: 53 MG/DL
TSH SERPL DL<=0.05 MIU/L-ACNC: 1.57 MIU/L (ref 0.3–5)
VLDLC SERPL CALC-MCNC: NORMAL MG/DL (ref 1–30)
WBC # BLD: 6.9 K/UL (ref 3.5–11.3)

## 2021-05-06 PROCEDURE — 80061 LIPID PANEL: CPT

## 2021-05-06 PROCEDURE — 80048 BASIC METABOLIC PNL TOTAL CA: CPT

## 2021-05-06 PROCEDURE — 84450 TRANSFERASE (AST) (SGOT): CPT

## 2021-05-06 PROCEDURE — 84460 ALANINE AMINO (ALT) (SGPT): CPT

## 2021-05-06 PROCEDURE — 36415 COLL VENOUS BLD VENIPUNCTURE: CPT

## 2021-05-06 PROCEDURE — 84443 ASSAY THYROID STIM HORMONE: CPT

## 2021-05-06 PROCEDURE — 84439 ASSAY OF FREE THYROXINE: CPT

## 2021-05-06 PROCEDURE — 85027 COMPLETE CBC AUTOMATED: CPT

## 2021-05-10 DIAGNOSIS — R74.8 ELEVATED LIVER ENZYMES: Primary | ICD-10-CM

## 2021-05-26 DIAGNOSIS — F98.8 ATTENTION DEFICIT DISORDER, UNSPECIFIED HYPERACTIVITY PRESENCE: ICD-10-CM

## 2021-05-26 DIAGNOSIS — E10.69 TYPE 1 DIABETES MELLITUS WITH OTHER SPECIFIED COMPLICATION (HCC): ICD-10-CM

## 2021-05-26 RX ORDER — DEXTROAMPHETAMINE SACCHARATE, AMPHETAMINE ASPARTATE MONOHYDRATE, DEXTROAMPHETAMINE SULFATE AND AMPHETAMINE SULFATE 7.5; 7.5; 7.5; 7.5 MG/1; MG/1; MG/1; MG/1
CAPSULE, EXTENDED RELEASE ORAL
Qty: 60 CAPSULE | Refills: 0 | Status: SHIPPED | OUTPATIENT
Start: 2021-05-26 | End: 2021-10-20 | Stop reason: SDUPTHER

## 2021-05-26 RX ORDER — ENALAPRIL MALEATE 2.5 MG/1
TABLET ORAL
Qty: 90 TABLET | Refills: 3 | Status: SHIPPED | OUTPATIENT
Start: 2021-05-26 | End: 2022-06-13

## 2021-05-26 RX ORDER — ATORVASTATIN CALCIUM 10 MG/1
TABLET, FILM COATED ORAL
Qty: 90 TABLET | Refills: 3 | Status: SHIPPED | OUTPATIENT
Start: 2021-05-26 | End: 2022-06-13

## 2021-08-04 RX ORDER — DILTIAZEM HYDROCHLORIDE 360 MG/1
CAPSULE, EXTENDED RELEASE ORAL
Qty: 90 CAPSULE | Refills: 1 | Status: SHIPPED | OUTPATIENT
Start: 2021-08-04

## 2021-08-04 RX ORDER — DILTIAZEM HYDROCHLORIDE 360 MG/1
CAPSULE, EXTENDED RELEASE ORAL
Qty: 30 CAPSULE | Refills: 0 | OUTPATIENT
Start: 2021-08-04

## 2021-08-04 NOTE — TELEPHONE ENCOUNTER
Marilin Griffiths from [address] [place] [address] called asking for new RX for pended med, stating they transferred RX to a local [facility] and she is un able to get the RX back, please advise, thank you.           Health Maintenance   Topic Date Due    Diabetic foot exam  Never done    Diabetic microalbuminuria test  10/30/2020    Hepatitis B vaccine (1 of 3 - Risk 3-dose series) 04/21/2022 (Originally 9/1/1980)    Shingles Vaccine (2 of 2) 04/21/2022 (Originally 12/4/2019)    Hepatitis C screen  04/21/2022 (Originally 1960)    HIV screen  04/21/2022 (Originally 9/1/1976)    Flu vaccine (1) 09/01/2021    A1C test (Diabetic or Prediabetic)  09/30/2021    Lipid screen  05/06/2022    Potassium monitoring  05/06/2022    Creatinine monitoring  05/06/2022    Diabetic retinal exam  06/02/2022    Breast cancer screen  07/01/2022    Colon cancer screen colonoscopy  12/10/2024    Cervical cancer screen  09/29/2025    Pneumococcal 0-64 years Vaccine (2 of 2 - PPSV23) 09/01/2026    DTaP/Tdap/Td vaccine (2 - Td or Tdap) 06/17/2029    COVID-19 Vaccine  Completed    Hepatitis A vaccine  Aged Out    Hib vaccine  Aged Out    Meningococcal (ACWY) vaccine  Aged Out             (applicable per patient's age: Cancer Screenings, Depression Screening, Fall Risk Screening, Immunizations)    Hemoglobin A1C (%)   Date Value   09/30/2020 7.4   06/30/2020 7.3   10/30/2019 6.8     LDL Cholesterol (mg/dL)   Date Value   05/06/2021 37     LDL Calculated (mg/dL)   Date Value   10/30/2019 61.8     AST (U/L)   Date Value   05/06/2021 48 (H)     ALT (U/L)   Date Value   05/06/2021 47 (H)     BUN (mg/dL)   Date Value   05/06/2021 20      (goal A1C is < 7)   (goal LDL is <100) need 30-50% reduction from baseline     BP Readings from Last 3 Encounters:   04/21/21 122/80   01/19/21 120/72   09/29/20 134/80    (goal /80)      All Future Testing planned in [facility]:  Lab Frequency Next Occurrence   ALT Once 07/23/2021   AST Once 07/23/2021 Next Visit Date:  No future appointments.          Patient Active Problem List:     Family history of colon cancer in father     Colon polyp     Diabetic ketoacidosis without coma associated with type 1 diabetes mellitus (HCC)     Mild malnutrition ([address]     Frequent PVCs     DM (diabetes mellitus), type 1 ([address]     Sepsis ([facility] [address]     UTI due to extended-spectrum beta lactamase (ESBL) producing Escherichia coli     Other constipation     Chronic cholecystitis     S/P cholecystectomy     Shortness of breath     Abnormal resting ECG findings     Mild aortic stenosis by prior echocardiogram     Bacteremia due to Escherichia coli     Abnormal result of other cardiovascular function study     History of colon polyps     DDD (degenerative disc disease), cervical     Lichen sclerosus of female genitalia     Hypertension     Attention deficit disorder     Primary insomnia

## 2021-08-05 DIAGNOSIS — E10.69 TYPE 1 DIABETES MELLITUS WITH OTHER SPECIFIED COMPLICATION (HCC): ICD-10-CM

## 2021-08-05 NOTE — TELEPHONE ENCOUNTER
Health Maintenance   Topic Date Due    Diabetic foot exam  Never done    Diabetic microalbuminuria test  10/30/2020    Hepatitis B vaccine (1 of 3 - Risk 3-dose series) 04/21/2022 (Originally 9/1/1979)    Shingles Vaccine (2 of 2) 04/21/2022 (Originally 12/4/2019)    Hepatitis C screen  04/21/2022 (Originally 1960)    HIV screen  04/21/2022 (Originally 9/1/1976)    Flu vaccine (1) 09/01/2021    A1C test (Diabetic or Prediabetic)  09/30/2021    Lipid screen  05/06/2022    Potassium monitoring  05/06/2022    Creatinine monitoring  05/06/2022    Diabetic retinal exam  06/02/2022    Breast cancer screen  07/01/2022    Colon cancer screen colonoscopy  12/10/2024    Cervical cancer screen  09/29/2025    Pneumococcal 0-64 years Vaccine (2 of 2 - PPSV23) 09/01/2026    DTaP/Tdap/Td vaccine (2 - Td or Tdap) 06/17/2029    COVID-19 Vaccine  Completed    Hepatitis A vaccine  Aged Out    Hib vaccine  Aged Out    Meningococcal (ACWY) vaccine  Aged Out             (applicable per patient's age: Cancer Screenings, Depression Screening, Fall Risk Screening, Immunizations)    Hemoglobin A1C (%)   Date Value   09/30/2020 7.4   06/30/2020 7.3   10/30/2019 6.8     LDL Cholesterol (mg/dL)   Date Value   05/06/2021 37     LDL Calculated (mg/dL)   Date Value   10/30/2019 61.8     AST (U/L)   Date Value   05/06/2021 48 (H)     ALT (U/L)   Date Value   05/06/2021 47 (H)     BUN (mg/dL)   Date Value   05/06/2021 20      (goal A1C is < 7)   (goal LDL is <100) need 30-50% reduction from baseline     BP Readings from Last 3 Encounters:   04/21/21 122/80   01/19/21 120/72   09/29/20 134/80    (goal /80)      All Future Testing planned in [facility]:  Lab Frequency Next Occurrence   ALT Once 07/23/2021   AST Once 07/23/2021       Next Visit Date:  No future appointments.          Patient Active Problem List:     Family history of colon cancer in father     Colon polyp     Diabetic ketoacidosis without coma associated with type 1 diabetes mellitus (HCC)     Mild malnutrition (HCC)     Frequent PVCs     DM (diabetes mellitus), type 1 ([facility] [address]     Sepsis ([facility] [address]     UTI due to extended-spectrum beta lactamase (ESBL) producing Escherichia coli     Other constipation     Chronic cholecystitis     S/P cholecystectomy     Shortness of breath     Abnormal resting ECG findings     Mild aortic stenosis by prior echocardiogram     Bacteremia due to Escherichia coli     Abnormal result of other cardiovascular function study     History of colon polyps     DDD (degenerative disc disease), cervical     Lichen sclerosus of female genitalia     Hypertension     Attention deficit disorder     Primary insomnia

## 2021-10-12 ENCOUNTER — NURSE ONLY (OUTPATIENT)
Dept: FAMILY MEDICINE CLINIC | Age: 61
End: 2021-10-12
Payer: COMMERCIAL

## 2021-10-12 DIAGNOSIS — Z23 NEEDS FLU SHOT: Primary | ICD-10-CM

## 2021-10-12 PROCEDURE — 99999 PR OFFICE/OUTPT VISIT,PROCEDURE ONLY: CPT | Performed by: NURSE PRACTITIONER

## 2021-10-12 PROCEDURE — 90471 IMMUNIZATION ADMIN: CPT | Performed by: NURSE PRACTITIONER

## 2021-10-12 PROCEDURE — 90694 VACC AIIV4 NO PRSRV 0.5ML IM: CPT | Performed by: NURSE PRACTITIONER

## 2021-10-20 ENCOUNTER — OFFICE VISIT (OUTPATIENT)
Dept: FAMILY MEDICINE CLINIC | Age: 61
End: 2021-10-20
Payer: COMMERCIAL

## 2021-10-20 VITALS
HEIGHT: 64 IN | TEMPERATURE: 96.4 F | DIASTOLIC BLOOD PRESSURE: 84 MMHG | WEIGHT: 164 LBS | BODY MASS INDEX: 28 KG/M2 | OXYGEN SATURATION: 99 % | HEART RATE: 72 BPM | SYSTOLIC BLOOD PRESSURE: 138 MMHG | RESPIRATION RATE: 14 BRPM

## 2021-10-20 DIAGNOSIS — E10.69 TYPE 1 DIABETES MELLITUS WITH OTHER SPECIFIED COMPLICATION (HCC): Primary | ICD-10-CM

## 2021-10-20 DIAGNOSIS — I10 ESSENTIAL HYPERTENSION: ICD-10-CM

## 2021-10-20 DIAGNOSIS — R63.5 WEIGHT GAIN: ICD-10-CM

## 2021-10-20 DIAGNOSIS — F51.01 PRIMARY INSOMNIA: ICD-10-CM

## 2021-10-20 DIAGNOSIS — F98.8 ATTENTION DEFICIT DISORDER, UNSPECIFIED HYPERACTIVITY PRESENCE: ICD-10-CM

## 2021-10-20 PROCEDURE — 99214 OFFICE O/P EST MOD 30 MIN: CPT | Performed by: NURSE PRACTITIONER

## 2021-10-20 RX ORDER — DEXTROAMPHETAMINE SACCHARATE, AMPHETAMINE ASPARTATE MONOHYDRATE, DEXTROAMPHETAMINE SULFATE AND AMPHETAMINE SULFATE 7.5; 7.5; 7.5; 7.5 MG/1; MG/1; MG/1; MG/1
CAPSULE, EXTENDED RELEASE ORAL
Qty: 60 CAPSULE | Refills: 0 | Status: SHIPPED | OUTPATIENT
Start: 2021-10-20 | End: 2021-12-29 | Stop reason: SDUPTHER

## 2021-10-20 RX ORDER — TRAZODONE HYDROCHLORIDE 100 MG/1
100 TABLET ORAL NIGHTLY
Qty: 90 TABLET | Refills: 3 | Status: SHIPPED | OUTPATIENT
Start: 2021-10-20

## 2021-10-20 NOTE — PROGRESS NOTES
myocardial ischemia or infarction. EF 66%. The pt Flores treadmill score is 4 which correlates with a low/intermiediate risk for CAD.  History of Holter monitoring 02/16/2018    Very frequent PVC's. No symptoms were reported. Very frequent premature ventricular ectopic beats total 5610 consisting of 5610 isolated PVC's.  History of Holter monitoring 02/15/2018    Very frequent PVC's. No symptoms were reported. Consider additional testing and/or treatment if clinically indicated.  Hypertension     PONV (postoperative nausea and vomiting)       Reviewed all health maintenance requirements and ordered appropriate tests  Health Maintenance Due   Topic Date Due    Diabetic microalbuminuria test  10/30/2020    Diabetic retinal exam  06/02/2021    A1C test (Diabetic or Prediabetic)  09/30/2021       Past Surgical History:     Past Surgical History:   Procedure Laterality Date    CARDIAC CATHETERIZATION Left 10/25/2018    right radial/[facility]/Dr. Bard Pineda    CHOLECYSTECTOMY  09/20/2018    CHOLECYSTECTOMY LAPAROSCOPIC ROBOTIC (N/A )    COLONOSCOPY  01/11/2017    -polyp(adenomatous polyp)    COLONOSCOPY  12/10/2019    -normal    COLONOSCOPY N/A 12/10/2019    COLORECTAL CANCER SCREENING, HIGH RISK performed by Alfonzo Cabot, MD at [facility] 2007    PAIN MANAGEMENT PROCEDURE N/A 3/24/2020    EPIDURAL STEROID INJECTION-CERVICAL performed by Steffi Munson MD at [address] OFFICE/OUTPT [facility] N/A 9/20/2018    CHOLECYSTECTOMY LAPAROSCOPIC ROBOTIC performed by Lucina Suárez DO at [address] Left 2009    TUBAL LIGATION  1993        Medications:       Prior to Admission medications    Medication Sig Start Date End Date Taking?  Authorizing Provider   traZODone (DESYREL) 100 MG tablet Take 1 tablet by mouth nightly 10/20/21  Yes Pamla Reasons, APRN - CNP   amphetamine-dextroamphetamine (ADDERALL XR) 30 MG extended release capsule TAKE 1 CAPSULE BY MOUTH IN THE MORNING, AND 1 CAPSULE AT NOON. 10/20/21 11/20/21 Yes WILMER Lyon - CNP   insulin aspart (NOVOLOG RELION) 100 UNIT/ML injection vial INJECT VIA PUMP INSTRUCTION 10/20/21  Yes Merna Monreal, APRN - DELMY   dilTIAZem (CARDIZEM CD) 360 MG extended release capsule TAKE 1 CAPSULE BY MOUTH ONCE DAILY 8/4/21  Yes Merna Monreal APRN - CNP   enalapril (VASOTEC) 2.5 MG tablet TAKE 1 TABLET BY MOUTH ONCE DAILY 5/26/21  Yes Merna Monreal, APRN - CNP   atorvastatin (LIPITOR) 10 MG tablet TAKE 1 TABLET BY MOUTH ONCE DAILY 5/26/21  Yes Merna Monreal APRN - CNP   traZODone (DESYREL) 50 MG tablet Take 1 tablet by mouth nightly as needed for Sleep 1/19/21  Yes Merna Monreal APRN - CNP   conjugated estrogens (PREMARIN) 0.625 MG/GM vaginal cream Place vaginally daily x2 weeks. Then place vaginally two times per week. 9/29/20  Yes Merna Monreal APRN - CNP   fluocinonide (LIDEX) 0.05 % cream Apply topically 2 times daily. 9/29/20  Yes Merna Monreal APRN - CNP   Misc Natural Products (Stauffer Cullens COMPLEX/MSM PO) Take by mouth daily   Yes Historical Provider, MD   BIOTIN PO Take by mouth daily   Yes Historical Provider, MD   clobetasol (TEMOVATE) 0.05 % ointment Apply topically once daily at night x 4 weeks. Then apply topically once every other day x 4 weeks. Apply topically twice a week x 4 weeks.  8/21/20  Yes Merna Monreal APRN - CNP   methenamine (HIPREX) 1 g tablet Take 1 tablet by mouth 4 times daily  Patient taking differently: Take 1 g by mouth 2 times daily  7/13/20  Yes Savanah Borges MD   Bacillus Coagulans-Inulin (ALIGN PREBIOTIC-PROBIOTIC PO) Take 1 tablet by mouth daily   Yes Historical Provider, MD   Psyllium (METAMUCIL PO) Take by mouth daily   Yes Historical Provider, MD   acetaminophen (TYLENOL) 500 MG tablet Take 2 tablets by mouth every 6 hours as needed for Pain or Fever 9/25/18  Yes Domingo Winkler PA-C   EPINEPHrine (EPIPEN 2-RAS) 0.3 MG/0.3ML Mini Jones injection Inject 0.3 mLs into the muscle once for 1 dose Use as directed for allergic reaction 5/5/20 5/5/20  Nanda Viera MD        Allergies:       Bee venom and Sulfa antibiotics    Social History:     Tobacco:    reports that she quit smoking about 21 years ago. She has a 1.25 pack-year smoking history. She has never used smokeless tobacco.  Alcohol:      reports current alcohol use. Drug Use:  reports no history of drug use. Family History:     Family History   Problem Relation Age of Onset    Heart Attack Brother 48       Review of Systems:     Positive and Negative as described in HPI    Review of Systems   Endocrine: Negative for polydipsia, polyphagia and polyuria. Psychiatric/Behavioral: Positive for sleep disturbance. Physical Exam:   Vitals:  /84   Pulse 72   Temp 96.4 °F (35.8 °C)   Resp 14   Ht 5' 4\" (1.626 m)   Wt 164 lb (74.4 kg)   SpO2 99%   BMI 28.15 kg/m²     Physical Exam  Constitutional:       General: She is not in acute distress. Appearance: Normal appearance. She is normal weight. She is not ill-appearing or toxic-appearing. HENT:      Head: Normocephalic. Cardiovascular:      Rate and Rhythm: Normal rate and regular rhythm. Pulses:           Dorsalis pedis pulses are 2+ on the right side and 2+ on the left side. Posterior tibial pulses are 2+ on the right side and 2+ on the left side. Heart sounds: Normal heart sounds. No murmur heard. Pulmonary:      Effort: Pulmonary effort is normal. No respiratory distress. Breath sounds: Normal breath sounds. No stridor. No wheezing, rhonchi or rales. Abdominal:      General: Abdomen is flat. Bowel sounds are normal. There is no distension. Palpations: Abdomen is soft. There is no mass. Tenderness: There is no abdominal tenderness. There is no guarding. Hernia: No hernia is present. Feet:      Right foot:      Protective Sensation: 6 sites tested. 6 sites sensed.       Skin integrity: Skin integrity normal. No ulcer, blister, skin breakdown, erythema, warmth, callus, dry skin or fissure. Toenail Condition: Right toenails are normal.      Left foot:      Protective Sensation: 6 sites tested. 6 sites sensed. Skin integrity: Skin integrity normal. No ulcer, blister, skin breakdown, erythema, warmth, callus, dry skin or fissure. Toenail Condition: Left toenails are normal.      Comments: Normal monofilament and vibratory sense bilaterally  Skin:     General: Skin is warm. Neurological:      Mental Status: She is alert and oriented to person, place, and time. Psychiatric:         Mood and Affect: Mood normal.         Behavior: Behavior normal.         Thought Content: Thought content normal.         Judgment: Judgment normal.         Data:     Lab Results   Component Value Date     05/06/2021    K 4.8 05/06/2021     05/06/2021    CO2 28 05/06/2021    BUN 20 05/06/2021    CREATININE 0.87 05/06/2021    GLUCOSE 118 05/06/2021    PROT 6.1 09/25/2018    LABALBU 4.2 10/30/2019    BILITOT 0.60 10/30/2019    ALKPHOS 80.0 10/30/2019    AST 48 05/06/2021    ALT 47 05/06/2021     Lab Results   Component Value Date    WBC 6.9 05/06/2021    RBC 4.14 05/06/2021    HGB 13.3 05/06/2021    HCT 40.3 05/06/2021    MCV 97.3 05/06/2021    MCH 32.1 05/06/2021    MCHC 33.0 05/06/2021    RDW 13.0 05/06/2021     05/06/2021    MPV 9.1 05/06/2021     Lab Results   Component Value Date    TSH 1.57 05/06/2021     Lab Results   Component Value Date    CHOL 157 05/06/2021    CHOL 193 02/20/2017     05/06/2021    LABA1C 7.4 09/30/2020       Assessment/Plan:      Diagnosis Orders   1. Type 1 diabetes mellitus with other specified complication (HCC)  Diabetic Foot Exam    insulin aspart (NOVOLOG RELION) 100 UNIT/ML injection vial   2. Attention deficit disorder, unspecified hyperactivity presence  amphetamine-dextroamphetamine (ADDERALL XR) 30 MG extended release capsule   3. Essential hypertension     4. Primary insomnia     5. Weight gain         Hypertension:  Mildly elevated in office 138/84  She will monitor blood pressure at home once daily x2 weeks and notify office of these readings in 2 weeks. Will review and adjust treatment plan as appropriate  Reviewed lifestyle modifications to assist with lowering blood pressure including weight loss, healthy diet, exercising routinely, low-sodium diet, limiting caffeine and alcohol, and encouraging stress relief techniques. Weight gain:  Patient planning to follow with nutritionist and exercise     Bilateral Hand Numbness:   Patient notes she had an EMG completed showing carpal tunnel  She has been in contact with Dr. German Matter office (orthopedics) to discuss surgery. I requested EMG results    ADD:  PDMP reviewed. Continue Adderall 30 mg XR twice daily. Discussed with patient she will require every 3-month visits due to controlled substance prescription. Insomnia:  Increase trazodone from 50 mg to 100 mg. Notify office if symptoms worsen or persist.    DM:  Continue following with endocrinology. Diabetic foot check completed today in office, WNL  Request A1c from endocrinologist.    Completed Refills   Requested Prescriptions     Signed Prescriptions Disp Refills    traZODone (DESYREL) 100 MG tablet 90 tablet 3     Sig: Take 1 tablet by mouth nightly    amphetamine-dextroamphetamine (ADDERALL XR) 30 MG extended release capsule 60 capsule 0     Sig: TAKE 1 CAPSULE BY MOUTH IN THE MORNING, AND 1 CAPSULE AT NOON.  insulin aspart (NOVOLOG RELION) 100 UNIT/ML injection vial 30 mL 3     Sig: INJECT VIA PUMP INSTRUCTION       Orders Placed This Encounter   Procedures    Diabetic Foot Exam        No results found for this visit on 10/20/21. Return in about 3 months (around 1/20/2022).     Electronically signed by WILMER Finch CNP on 10/20/21 at 12:25 PM.

## 2021-10-20 NOTE — PATIENT INSTRUCTIONS
SURVEY:    You may be receiving a survey from [email or web address] regarding your visit today. Please complete the survey to enable us to provide the highest quality of care to you and your family. If you cannot score us a very good on any question, please call the office to discuss how we could of made your experience a very good one. Thank you.

## 2021-12-03 NOTE — PROGRESS NOTES
Neuro Office Visit      Encounter Date: 2021   Patient Name: Joanie Higuera  : 1966   MRN: [id number]   PCP: Dr Brock  Referring Provider: Dr Mata  Chief Complaint:    Chief Complaint   Patient presents with   • Dizziness     collapsed        History of Present Illness: Joanie Higuera is a 55 y.o. female who is here today in Neurology for syncope rule out seizures    Drives from [place]. Previous neuro eval with Dr Hernandez  for dizziness    Episode   while out on a first date, difficultly with word finding, concentrating. She said she felt dizzy and no recollection of events for about 30 minutes. Date noticed a quiver, then she slumped, head fell, eyes rolled back. She came back to, slumped again. She was able to hear him and tried to respond, but did not have LOC. Did not fall from the chair. No seizure activity. Duration 10 min. Did not feel like usual syncope.    No unusual tastes or smells, could have had jada isha. No double vision or shadows. She had no memory of the event and after event duration 20-30 min. No tongue biting, B/B incontinence. Sleepy for 3 days. No medical evaluation.     No history of seizures. Her father with CVA, TIA, brain tumor, seizure.   Had one cocktail and no drug use prior to episode. No new meds.    No further episodes since August.    MRI brain 2019 normal-[facility]  MRI c-spine 2020 large disc extrusion C4-5 causing moderate central canal narrowing. Takes GBP for UE paresthesia    RLS  Taking Mirapex    Migraine  Taking TPM      Covid Infection  She is a covid long-hauler. Suffering from hypoxia documented on her home pule ox. 75%-90%. Symptomatic with running but not walking. Near syncopal spell during triathelon.     A-V Fistula repair  10/23/2015 Left subcalvian artery/brachiocephalic vein AV fistual with coiling by Dr Moyer.  Had LHC with Dr Mata 2021-Left subclavian angiography/JERZY  The patient is given the influenza vaccination in the office today. See Immunization Record for details. angiography revealed complete occlusion of the previously noted fistula from the proximal JERZY to the left superior vena cava.  Collateral flow was seen through the intercostal arteries to supply the distal portion of the left JERZY.Cardiac Catheterization/Vascular Study (11/02/2021 17:08)  9/8/21 left lung embolization coils CTA chest.   Echo: 55% EF Trace mvr, trace tvr  Neurogenic syncope with pacemaker 1991, Explant due to MRSA 2012    HLD  Lipid Panel (11/02/2021 12:51)  , total 241, HDL 83    Shift disorder/fatigue  Takes Provigil prn  Subjective      Past Medical History:   Past Medical History:   Diagnosis Date   • A-V fistula (HCC)    • Acute adjustment disorder with mixed anxiety and depressed mood 12/17/2019   • Acute reaction to stress    • Allergic rhinitis    • Arthritis    • Asthma    • Bursitis of hip     LEFT   • Depression    • GERD (gastroesophageal reflux disease)    • Heart murmur    • Heart murmur    • Hyperlipemia    • Hypertension    • Hypotension    • Ischemic colitis (HCC)     HX   • Migraines    • Mitral valve prolapse 1992   • MRSA infection 2011    RESULTING IN PPM EXPLANT   • Overweight    • PONV (postoperative nausea and vomiting)    • Restless leg syndrome    • RLS (restless legs syndrome)    • Spinal headache    • Syncope, cardiogenic 1991    PM INSERTED   • Tachycardia    • Thyroid nodule     HX SURGERY       Past Surgical History:   Past Surgical History:   Procedure Laterality Date   • ARTERIOVENOUS FISTULA REPAIR  2015    ENDO REPAIR BY DR KAPLAN   • CARDIAC CATHETERIZATION     • CARDIAC CATHETERIZATION  08/05/2016    RIGHT HEART CATH PER DR. COLEMAN   • CARDIAC CATHETERIZATION N/A 8/5/2016    Procedure: Right Heart Cath;  Surgeon: Holley Coleman MD;  Location:  [facility];  Service:    • CARDIAC CATHETERIZATION Bilateral 11/2/2021    Procedure: Right and Left Heart Cath;  Surgeon: Holley Coleman MD;  Location:  [facility];  Service: Cardiology;  Laterality: Bilateral;   • CARDIAC PACEMAKER REMOVAL  2012    FOR STAPH INFECTION   • FOOT SURGERY Right    • HYSTERECTOMY      ovaries remain   • PACEMAKER IMPLANTATION  1991 - ORIGINAL   • THYROID LOBECTOMY Left        Family History:   Family History   Problem Relation Age of Onset   • Arrhythmia Mother    • Colon cancer Mother    • Other Mother    • Stroke Father    • Diabetes Father    • Parkinsonism Father    • Seizures Father    • Heart disease Father        Social History:   Social History     Socioeconomic History   • Marital status: Single   Tobacco Use   • Smoking status: Never Smoker   • Smokeless tobacco: Never Used   Vaping Use   • Vaping Use: Never used   Substance and Sexual Activity   • Alcohol use: Yes     Alcohol/week: 5.0 standard drinks     Types: 3 Glasses of wine, 2 Standard drinks or equivalent per week     Comment: social   • Drug use: No   • Sexual activity: Not Currently       Medications:     Current Outpatient Medications:   •  buPROPion XL (WELLBUTRIN XL) 300 MG 24 hr tablet, TAKE 1 TABLET BY MOUTH EVERY DAY, Disp: 90 tablet, Rfl: 0  •  cetirizine (ZyrTEC) 10 MG tablet, Take 10 mg by mouth at night as needed for allergies., Disp: , Rfl:   •  gabapentin (NEURONTIN) 300 MG capsule, Take 1 capsule by mouth 3 (Three) Times a Day., Disp: 90 capsule, Rfl: 2  •  LORazepam (ATIVAN) 0.5 MG tablet, Take 1 tablet by mouth Every 6 (Six) Hours As Needed for Anxiety., Disp: 15 tablet, Rfl: 1  •  meloxicam (MOBIC) 15 MG tablet, TAKE 1 TABLET BY MOUTH EVERY DAY, Disp: 90 tablet, Rfl: 0  •  modafinil (PROVIGIL) 200 MG tablet, Take 1 tablet by mouth Daily. (Patient taking differently: Take 200 mg by mouth As Needed.), Disp: 30 tablet, Rfl: 2  •  Multiple Vitamins-Minerals (MULTIVITAMIN ADULT PO), Take 1 tablet by mouth every morning., Disp: , Rfl:   •  pramipexole (MIRAPEX) 0.125 MG tablet, TAKE 1 TO 2 TABLETS BY MOUTH EVERY EVENING, Disp: 180 tablet, Rfl: 0  •   topiramate (TOPAMAX) 50 MG tablet, TAKE 1 TABLET BY MOUTH AT BEDTIME, Disp: 90 tablet, Rfl: 0  •  vitamin D (ERGOCALCIFEROL) 1.25 MG (79782 UT) capsule capsule, Take 1 capsule by mouth 1 (One) Time Per Week., Disp: 6 capsule, Rfl: 0  •  Magnesium Oxide 400 MG capsule, Take 400 mg by mouth As Needed., Disp: , Rfl:     Allergies:   No Known Allergies    PHQ-9 Total Score:     STEADI Fall Risk Assessment has not been completed.    Objective     Physical Exam:   Physical Exam  Eyes:      Pupils: Pupils are equal, round, and reactive to light.   Neurological:      Mental Status: She is oriented to person, place, and time.      Coordination: Finger-Nose-Finger Test, Heel to Shin Test and Romberg Test normal.      Gait: Gait is intact.      Deep Tendon Reflexes:      Reflex Scores:       Tricep reflexes are 2+ on the right side and 2+ on the left side.       Bicep reflexes are 2+ on the right side and 2+ on the left side.       Brachioradialis reflexes are 2+ on the right side and 2+ on the left side.       Patellar reflexes are 2+ on the right side and 2+ on the left side.       Achilles reflexes are 2+ on the right side and 2+ on the left side.  Psychiatric:         Speech: Speech normal.         Neurologic Exam     Mental Status   Oriented to person, place, and time.   Follows 3 step commands.   Attention: normal. Concentration: normal.   Speech: speech is normal   Level of consciousness: alert  Knowledge: consistent with education.   Normal comprehension.     Cranial Nerves     CN III, IV, VI   Pupils are equal, round, and reactive to light.  Right pupil: Accommodation: intact.   Left pupil: Accommodation: intact.   CN III: no CN III palsy  CN VI: no CN VI palsy  Nystagmus: none   Diplopia: none  Upgaze: normal  Downgaze: normal  Conjugate gaze: present    CN VII   Facial expression full, symmetric.     CN VIII   Hearing: intact    CN XII   CN XII normal.     Motor Exam   Muscle bulk: normal  Overall muscle tone:  "normal    Strength   Right biceps: 5/5  Left biceps: 5/5  Right triceps: 5/5  Left triceps: 5/5  Right interossei: 5/5  Left interossei: 5/5  Right quadriceps: 5/5  Left quadriceps: 5/5  Right anterior tibial: 5/5  Left anterior tibial: 5/5  Right posterior tibial: 5/5  Left posterior tibial: 5/5    Sensory Exam   Light touch normal.     Gait, Coordination, and Reflexes     Gait  Gait: normal    Coordination   Romberg: negative  Finger to nose coordination: normal  Heel to shin coordination: normal    Tremor   Resting tremor: absent  Action tremor: absent    Reflexes   Right brachioradialis: 2+  Left brachioradialis: 2+  Right biceps: 2+  Left biceps: 2+  Right triceps: 2+  Left triceps: 2+  Right patellar: 2+  Left patellar: 2+  Right achilles: 2+  Left achilles: 2+  Right : 2+  Left : 2+       Vital Signs:   Vitals:    12/03/21 1113   BP: 126/98   Pulse: 84   Resp: 18   Temp: 97.5 °F (36.4 °C)   SpO2: 99%   Weight: 75.8 kg (167 lb 3.2 oz)   Height: 175.3 cm (69.02\")     Body mass index is 24.68 kg/m².     Results:   Imaging:   CT Angiogram Chest With & Without Contrast    Result Date: 9/9/2021  1. Previous embolization coils and some radiodense material in the left lung as on multiple prior radiographs. No new pulmonary parenchymal disease. 2. No evidence of acute pulmonary embolic disease. 3. Multiple hepatic cysts again incidentally noted.  D:  09/08/2021 E:  09/08/2021  This report was finalized on 9/9/2021 8:24 AM by Dr. Francis Dugan MD.      Cardiac Catheterization/Vascular Study    Addendum Date: 11/9/2021    FINAL IMPRESSION: · Complete occlusion of the LIMA to left superior vena cava fistula following coiling. · Normal cardiac output and index · No evidence of shunt by oximetry   Indications: Reevaluation of the patient's cardiac output and index as well as evaluation for possible persistent flow following coiling of fistula from the left JERZY to left superior vena cava due to recurrent dyspnea.   " Access:  Right femoral artery, right femoral vein   Procedures: · Right heart catheterization with oximetry · Left subclavian artery/LIMA angiography.     Procedure narrative: The patient was brought to the catheterization lab in a fasting condition.  Access site was prepped and draped in standard sterile fashion.  Lidocaine was injected and arterial access was obtained by percutaneous anterior wall puncture technique.  A 6 Cymro arterial sheath was placed. Above procedures were performed without complications.  Likewise an 8 Cymro hemostasis sheath was placed in the right femoral vein.  At the conclusion the arterial sheath was removed and hemostasis was achieved.  The patient was transferred to the unit in a stable condition.            Hemodynamic Findings:   RA:  0 mmHg RV: 14/-2 mmHg PA:  12/2/6mmHg Mean PCWP: 0 mmHg CO: 5.4 L/min CI: 2.85 L/min/m^2 Oximetry: PA 69.6% RV 69.9% RA 69.9% SVC 68.8% IVC 79% Ao 93.2% (sedation)   Angiographic Findings:   Left subclavian angiography/JERZY angiography revealed complete occlusion of the previously noted fistula from the proximal JERZY to the left superior vena cava.  Collateral flow was seen through the intercostal arteries to supply the distal portion of the left JERZY.     Complications: No acute procedure related complications.     Result Date: 11/2/2021  · Complete occlusion of the LIMA to left superior vena cava fistula following coiling. · Normal cardiac output and index   Indications: Reevaluation of the patient's cardiac output and index as well as evaluation for possible persistent flow following coiling of fistula from the left JERZY to left superior vena cava due to recurrent dyspnea.   Access:  Right femoral artery, right femoral vein   Procedures: · Right heart catheterization with cardiac output and index · Left subclavian artery/LIMA angiography.     Procedure narrative: The patient was brought to the catheterization lab in a fasting condition.  Access site  was prepped and draped in standard sterile fashion.  Lidocaine was injected and arterial access was obtained by percutaneous anterior wall puncture technique.  A 6 German arterial sheath was placed. Above procedures were performed without complications.  Likewise an 8 German hemostasis sheath was placed in the right femoral vein.  At the conclusion the arterial sheath was removed and hemostasis was achieved.  The patient was transferred to the unit in a stable condition.            Hemodynamic Findings:   RA:  0 mmHg RV: 14/-2 mmHg PA:  12/2/6mmHg Mean PCWP: 0 mmHg CO: 5.4 L/min CI: 2.85 L/min/m^2   Angiographic Findings:   Left subclavian angiography/JERZY angiography revealed complete occlusion of the previously noted fistula from the proximal JERZY to the left superior vena cava.  Collateral flow was seen through the intercostal arteries to supply the distal portion of the left JERZY.     Complications: No acute procedure related complications.          Assessment / Plan      Assessment/Plan:   Diagnoses and all orders for this visit:    1. Syncope, unspecified syncope type (Primary)  -     MRI Brain With & Without Contrast; Future  -     Duplex Carotid Ultrasound CAR; Future  -     EEG (Hospital Performed); Future    2. Transient alteration of awareness  -     MRI Brain With & Without Contrast; Future  -     Duplex Carotid Ultrasound CAR; Future  -     EEG (Hospital Performed); Future    3. Syncope, cardiogenic    4. Mixed hyperlipidemia  Comments:  Monitor with PCP.    5. A-V fistula (HCC)  Comments:  Per Cardiology    6. RLS (restless legs syndrome)  Comments:  Cont mirapex per PCP    7. Migraine without status migrainosus, not intractable, unspecified migraine type  Comments:  Cont TPM per PCP    8. COVID-19 virus infection    9. Other fatigue  Comments:  Provigil prn per PCP       Doubt seizure. Check carotid and brain MRI.      Patient Education:       Reviewed medications, potential side effects and signs and  symptoms to report. Discussed risk versus benefits of treatment plan with patient and/or family-including medications, labs and radiology that may be ordered. Addressed questions and concerns during visit. Patient and/or family verbalized understanding and agree with plan. Instructed to call the office with any questions and report to ER with any life-threatening symptoms.     Follow Up:   Return in about 2 months (around 2/3/2022) for Recheck.    During this visit the following were done:  Labs Reviewed [x]    Labs Ordered []    Radiology Reports Reviewed [x]    Radiology Ordered [x]    PCP Records Reviewed [x]    Referring Provider Records Reviewed [x]    ER Records Reviewed []    Hospital Records Reviewed []    History Obtained From Family []    Radiology Images Reviewed [x]    Other Reviewed [x]    Records Requested []      Popeye Llanos, LYNDSEY, APRN

## 2021-12-29 DIAGNOSIS — F98.8 ATTENTION DEFICIT DISORDER, UNSPECIFIED HYPERACTIVITY PRESENCE: ICD-10-CM

## 2021-12-29 RX ORDER — DEXTROAMPHETAMINE SACCHARATE, AMPHETAMINE ASPARTATE MONOHYDRATE, DEXTROAMPHETAMINE SULFATE AND AMPHETAMINE SULFATE 7.5; 7.5; 7.5; 7.5 MG/1; MG/1; MG/1; MG/1
CAPSULE, EXTENDED RELEASE ORAL
Qty: 60 CAPSULE | Refills: 0 | Status: SHIPPED | OUTPATIENT
Start: 2021-12-29 | End: 2022-01-20 | Stop reason: ALTCHOICE

## 2022-01-20 ENCOUNTER — OFFICE VISIT (OUTPATIENT)
Dept: FAMILY MEDICINE CLINIC | Age: 62
End: 2022-01-20
Payer: COMMERCIAL

## 2022-01-20 VITALS
OXYGEN SATURATION: 98 % | SYSTOLIC BLOOD PRESSURE: 140 MMHG | TEMPERATURE: 96.7 F | DIASTOLIC BLOOD PRESSURE: 78 MMHG | WEIGHT: 165.38 LBS | BODY MASS INDEX: 28.24 KG/M2 | HEIGHT: 64 IN | HEART RATE: 68 BPM

## 2022-01-20 DIAGNOSIS — Z12.11 ENCOUNTER FOR SCREENING COLONOSCOPY: ICD-10-CM

## 2022-01-20 DIAGNOSIS — I10 ESSENTIAL HYPERTENSION: ICD-10-CM

## 2022-01-20 DIAGNOSIS — F98.8 ATTENTION DEFICIT DISORDER, UNSPECIFIED HYPERACTIVITY PRESENCE: Primary | ICD-10-CM

## 2022-01-20 DIAGNOSIS — R63.5 WEIGHT GAIN: ICD-10-CM

## 2022-01-20 DIAGNOSIS — K14.8 TONGUE THRUSTING: ICD-10-CM

## 2022-01-20 DIAGNOSIS — E10.69 TYPE 1 DIABETES MELLITUS WITH OTHER SPECIFIED COMPLICATION (HCC): ICD-10-CM

## 2022-01-20 DIAGNOSIS — R63.8 UNABLE TO LOSE WEIGHT: ICD-10-CM

## 2022-01-20 PROCEDURE — 99214 OFFICE O/P EST MOD 30 MIN: CPT | Performed by: NURSE PRACTITIONER

## 2022-01-20 RX ORDER — EPINEPHRINE 0.3 MG/.3ML
0.3 INJECTION SUBCUTANEOUS ONCE
Qty: 0.3 ML | Refills: 1 | Status: CANCELLED | OUTPATIENT
Start: 2022-01-20 | End: 2022-01-20

## 2022-01-20 RX ORDER — BLOOD-GLUCOSE TRANSMITTER
1 EACH MISCELLANEOUS DAILY
COMMUNITY
Start: 2021-04-28

## 2022-01-20 RX ORDER — DEXTROAMPHETAMINE SACCHARATE, AMPHETAMINE ASPARTATE MONOHYDRATE, DEXTROAMPHETAMINE SULFATE AND AMPHETAMINE SULFATE 6.25; 6.25; 6.25; 6.25 MG/1; MG/1; MG/1; MG/1
CAPSULE, EXTENDED RELEASE ORAL
Qty: 60 CAPSULE | Refills: 0 | Status: SHIPPED | OUTPATIENT
Start: 2022-01-20 | End: 2022-03-14 | Stop reason: SDUPTHER

## 2022-01-20 NOTE — PROGRESS NOTES
Name: Yuki Hearn  : 1960         Chief Complaint:     Chief Complaint   Patient presents with    3 Month Follow-Up     Pt presents for 3 mo f/u, DR Giselle North stated with pt history she should get colonoscopy every 3 yr which means she is due this year along with her mammogram    Hypertension    Diabetes    ADHD     tongue thrusting starting to affect her teeth    Insomnia    Weight Gain    Carpal Tunnel     brought last EMG in to discuss 2020, pt states she is having pain and numbness in both hands       History of Present Illness:      Yuki Hearn is a 62 y.o.  female who presents with 3 Month Follow-Up (Pt presents for 3 mo f/u, DR Giselle North stated with pt history she should get colonoscopy every 3 yr which means she is due this year along with her mammogram), Hypertension, Diabetes, ADHD (tongue thrusting starting to affect her teeth), Insomnia, Weight Gain, and Carpal Tunnel (brought last EMG in to discuss 2020, pt states she is having pain and numbness in both hands)      HPI     Hypertension:  Current medication regimen includes diltiazem 360 mg and enalapril 2.5 mg. She admits monitoring her blood pressure at home, averaging 120/70. She met with a nutritionist but states she is not following the diet plan. She states she does not have time to focus on healthy eating at this time. Denies routine exercise. Stress/Anxiety:  She admits increased stress related to the health of her . She states she has been sneaking cigarattes and has smoked more within the last year than she has her entire life. She admits a large change in her schedule which is causing stress. She states she has been on antidepressants and xanax in the past but states she is hesitant to restart this. DM:  Following with endocrinology: yes, most recent appt 2021. Per patient A1c 2021 6.9%.    Diabetic diet/low carb diet compliance: Denies diabetic diet compliance   Current exercise: None  Hypoglycemic episodes: no  Last eye exam: 12/2021  Current symptoms: Denies excessive hunger, excessive thirst, or increased frequency of urination. Medication Therapy: insulin pump    ADD:  Patient confirms she is taking adderall XR 30mg BID. Admits benefit with this medication in regards to her focus. Admits tongue thrusting and fidgeting with her fingers. She states she was flossing her teeth and noticed that her lower tooth was loose. She has talked to her dentist and previous PCPs regarding this in the past.     Past Medical History:     Past Medical History:   Diagnosis Date    Diabetes mellitus ([address]     H/O cardiovascular stress test 11/14/2017    Largerly normal moyocardial perfusion imaging with a soft tissue artifact, but without evidence of significant myocardial ischemia or infarction. EF 66%. The pt Flores treadmill score is 4 which correlates with a low/intermiediate risk for CAD.  History of Holter monitoring 02/16/2018    Very frequent PVC's. No symptoms were reported. Very frequent premature ventricular ectopic beats total 5610 consisting of 5610 isolated PVC's.  History of Holter monitoring 02/15/2018    Very frequent PVC's. No symptoms were reported. Consider additional testing and/or treatment if clinically indicated.      Hypertension     PONV (postoperative nausea and vomiting)       Reviewed all health maintenance requirements and ordered appropriate tests  Health Maintenance Due   Topic Date Due    Depression Screen  Never done    Diabetic microalbuminuria test  10/30/2020    Diabetic retinal exam  02/03/2022       Past Surgical History:     Past Surgical History:   Procedure Laterality Date    CARDIAC CATHETERIZATION Left 10/25/2018    right radial/[facility]/Dr. Diana Obando    CHOLECYSTECTOMY  09/20/2018    CHOLECYSTECTOMY LAPAROSCOPIC ROBOTIC (N/A )    COLONOSCOPY  01/11/2017    -polyp(adenomatous polyp)    COLONOSCOPY  12/10/2019    -normal    COLONOSCOPY N/A 12/10/2019 COLORECTAL CANCER SCREENING, HIGH RISK performed by Dannie Max MD at [address] [facility] Bilateral 2007    PAIN MANAGEMENT PROCEDURE N/A 3/24/2020    EPIDURAL STEROID INJECTION-CERVICAL performed by Debbi Kim MD at [address] [facility] OFFICE/OUTPT [facility] N/A 9/20/2018    CHOLECYSTECTOMY LAPAROSCOPIC ROBOTIC performed by Raffy Morrow DO at [address] Left 2009    TUBAL LIGATION  1993        Medications:       Prior to Admission medications    Medication Sig Start Date End Date Taking? Authorizing Provider   Continuous Blood Gluc Transmit (DEXCOM G6 TRANSMITTER) MISC 1 each by Other route daily 4/28/21  Yes Historical Provider, MD   amphetamine-dextroamphetamine (ADDERALL XR) 25 MG extended release capsule Take one capsule by mouth in the morning. Repeat second dose as needed in the afternoon. 1/20/22 2/20/22 Yes WILMER Melendez CNP   traZODone (DESYREL) 100 MG tablet Take 1 tablet by mouth nightly 10/20/21  Yes WILMER Melendez CNP   insulin aspart (NOVOLOG RELION) 100 UNIT/ML injection vial INJECT VIA PUMP INSTRUCTION 10/20/21  Yes WILMER Melendez CNP   dilTIAZem (CARDIZEM CD) 360 MG extended release capsule TAKE 1 CAPSULE BY MOUTH ONCE DAILY 8/4/21  Yes WILMER Melendez CNP   enalapril (VASOTEC) 2.5 MG tablet TAKE 1 TABLET BY MOUTH ONCE DAILY 5/26/21  Yes WILMER Melendez CNP   atorvastatin (LIPITOR) 10 MG tablet TAKE 1 TABLET BY MOUTH ONCE DAILY 5/26/21  Yes WILMER Melendez CNP   conjugated estrogens (PREMARIN) 0.625 MG/GM vaginal cream Place vaginally daily x2 weeks. Then place vaginally two times per week. 9/29/20  Yes WILMER Melendez CNP   fluocinonide (LIDEX) 0.05 % cream Apply topically 2 times daily.  9/29/20  Yes WILMER Melendez CNP   Misc Natural Products (GLUCOSAMINE CHOND COMPLEX/MSM PO) Take by mouth daily   Yes Historical Provider, MD   clobetasol (TEMOVATE) 0.05 % ointment Apply topically once daily at night x 4 weeks. Then apply topically once every other day x 4 weeks. Apply topically twice a week x 4 weeks. 8/21/20  Yes WILMER Giron - CNP   Bacillus Coagulans-Inulin (ALIGN PREBIOTIC-PROBIOTIC PO) Take 1 tablet by mouth daily   Yes Historical Provider, MD   Psyllium (METAMUCIL PO) Take by mouth daily   Yes Historical Provider, MD   BIOTIN PO Take by mouth daily    Historical Provider, MD   EPINEPHrine (EPIPEN 2-RAS) 0.3 MG/0.3ML SOAJ injection Inject 0.3 mLs into the muscle once for 1 dose Use as directed for allergic reaction 5/5/20 5/5/20  Maira Reina MD   acetaminophen (TYLENOL) 500 MG tablet Take 2 tablets by mouth every 6 hours as needed for Pain or Fever  Patient not taking: Reported on 1/20/2022 9/25/18   Rober Winkler PA-C        Allergies:       Bee venom and Sulfa antibiotics    Social History:     Tobacco:    reports that she quit smoking about 22 years ago. She has a 1.25 pack-year smoking history. She has never used smokeless tobacco.  Alcohol:      reports current alcohol use. Drug Use:  reports no history of drug use. Family History:     Family History   Problem Relation Age of Onset    Heart Attack Brother 48       Review of Systems:     Positive and Negative as described in HPI    Review of Systems   Endocrine: Negative for polydipsia, polyphagia and polyuria. Musculoskeletal:        Admits hand pain bilaterally with numbness. She admits decreased function and she is now starting to drop things. She had EMG completed 5/2020. Psychiatric/Behavioral: Negative for decreased concentration. The patient is nervous/anxious. Physical Exam:   Vitals:  BP (!) 140/78   Pulse 68   Temp 96.7 °F (35.9 °C)   Ht 5' 4.02\" (1.626 m)   Wt 165 lb 6 oz (75 kg)   SpO2 98%   BMI 28.37 kg/m²     Physical Exam  Constitutional:       General: She is not in acute distress. Appearance: Normal appearance. She is normal weight. She is not ill-appearing or toxic-appearing. HENT:      Head: Normocephalic. Mouth/Throat:      Tongue: No lesions. Tongue does not deviate from midline. Palate: No mass and lesions. Pharynx: Oropharynx is clear. No pharyngeal swelling or posterior oropharyngeal erythema. Cardiovascular:      Rate and Rhythm: Normal rate and regular rhythm. Heart sounds: Normal heart sounds. No murmur heard. Pulmonary:      Effort: Pulmonary effort is normal. No respiratory distress. Breath sounds: Normal breath sounds. No stridor. No wheezing, rhonchi or rales. Musculoskeletal:      Comments: Positive phalens test bilaterally   Neurological:      Mental Status: She is alert and oriented to person, place, and time. Psychiatric:         Mood and Affect: Mood normal.         Behavior: Behavior normal.         Thought Content: Thought content normal.         Judgment: Judgment normal.         Data:     Lab Results   Component Value Date     05/06/2021    K 4.8 05/06/2021     05/06/2021    CO2 28 05/06/2021    BUN 20 05/06/2021    CREATININE 0.87 05/06/2021    GLUCOSE 118 05/06/2021    PROT 6.1 09/25/2018    LABALBU 4.2 10/30/2019    BILITOT 0.60 10/30/2019    ALKPHOS 80.0 10/30/2019    AST 48 05/06/2021    ALT 47 05/06/2021     Lab Results   Component Value Date    WBC 6.9 05/06/2021    RBC 4.14 05/06/2021    HGB 13.3 05/06/2021    HCT 40.3 05/06/2021    MCV 97.3 05/06/2021    MCH 32.1 05/06/2021    MCHC 33.0 05/06/2021    RDW 13.0 05/06/2021     05/06/2021    MPV 9.1 05/06/2021     Lab Results   Component Value Date    TSH 1.57 05/06/2021     Lab Results   Component Value Date    CHOL 157 05/06/2021    CHOL 193 02/20/2017     05/06/2021    LABA1C 7.4 09/30/2020       Assessment/Plan:      Diagnosis Orders   1. Attention deficit disorder, unspecified hyperactivity presence  amphetamine-dextroamphetamine (ADDERALL XR) 25 MG extended release capsule   2.  Encounter for screening colonoscopy  Amb External Referral To General Surgery   3. Type 1 diabetes mellitus with other specified complication ([facility] [address]     4. Essential hypertension     5. Weight gain     6. Unable to lose weight     7. Tongue thrusting       Stress/anxiety:   Counseled on various medication options. Offered initiation of Lexapro. Patient is hesitant to initiate medications at this time. -- Discussed options for counseling/CBT. She is agreeable. Contact information for [facility] counseling services supplied to patient today. Tongue thrusting:   Tics versus tardive dyskinesia versus Adderall side effects   Discussed the case with Dr. Abi Fish who believes symptoms are related to tics and are likely worsened by current stress/anxiety. I did discuss with Dr. Abi Fish lowering dose of Adderall as this can cause worsening symptoms in those with anxiety.  I discussed with the patient via phone following her visit today my concerns regarding her excessive Adderall dosage (30 mg twice daily) which may be worsening her anxiety/tics. After discussion with the patient, she is agreeable to decrease to 25 mg twice daily. We will recheck at her next visit with the plan of slowly decreasing this dose to lowest possible dose that still manages her symptoms. She was encouraged to call the office with any concerns or questions. DM:   -- Request records from endocrinology. Hypertension:   Elevated today in office.  She will monitor at home once daily x2 weeks and call office with these results at that time. Wellness:   Referral to Dr. Alicia Brown [facility]) placed today for screening colonoscopy   Patient will schedule mammogram    Carpal tunnel.    Reviewed EMG results from 2020 showing concern for carpal tunnel   Patient has trialed wrist splints without relief   Referral to Dr. Esther Oleary placed today (orthopedics)    Completed Refills   Requested Prescriptions     Signed Prescriptions Disp Refills    amphetamine-dextroamphetamine (ADDERALL XR) 25 MG extended release capsule 60 capsule 0     Sig: Take one capsule by mouth in the morning. Repeat second dose as needed in the afternoon. Orders Placed This Encounter   Procedures    Amb External Referral To General Surgery     Referral Priority:   Routine     Referral Type:   Consult for Advice and Opinion     Referred to Provider:   Elisha Carter MD     Requested Specialty:   General Surgery     Number of Visits Requested:   1        No results found for this visit on 01/20/22. Return in about 3 months (around 4/20/2022), or if symptoms worsen or fail to improve, for f/u (extended) .     Electronically signed by WILMER Toure CNP on 01/20/22 at 1:08 PM.

## 2022-01-20 NOTE — PATIENT INSTRUCTIONS
SURVEY:    You may be receiving a survey from [employer] regarding your visit today. Please complete the survey to enable us to provide the highest quality of care to you and your family. If you cannot score us a very good on any question, please call the office to discuss how we could of made your experience a very good one. Thank you.

## 2022-02-02 RX ORDER — EPINEPHRINE 0.3 MG/.3ML
INJECTION SUBCUTANEOUS
Qty: 1 EACH | Refills: 0 | Status: SHIPPED | OUTPATIENT
Start: 2022-02-02

## 2022-03-14 DIAGNOSIS — F98.8 ATTENTION DEFICIT DISORDER, UNSPECIFIED HYPERACTIVITY PRESENCE: ICD-10-CM

## 2022-03-14 RX ORDER — DEXTROAMPHETAMINE SACCHARATE, AMPHETAMINE ASPARTATE MONOHYDRATE, DEXTROAMPHETAMINE SULFATE AND AMPHETAMINE SULFATE 6.25; 6.25; 6.25; 6.25 MG/1; MG/1; MG/1; MG/1
CAPSULE, EXTENDED RELEASE ORAL
Qty: 60 CAPSULE | Refills: 0 | Status: SHIPPED | OUTPATIENT
Start: 2022-03-14 | End: 2022-05-17 | Stop reason: SDUPTHER

## 2022-03-14 NOTE — TELEPHONE ENCOUNTER
Health Maintenance   Topic Date Due    Depression Screen  Never done    Diabetic microalbuminuria test  10/30/2020    Diabetic retinal exam  02/03/2022    Shingles Vaccine (2 of 2) 04/21/2022 (Originally 12/4/2019)    Hepatitis C screen  04/21/2022 (Originally 1960)    HIV screen  04/21/2022 (Originally 9/1/1976)    Lipid screen  05/06/2022    Potassium monitoring  05/06/2022    Creatinine monitoring  05/06/2022    Breast cancer screen  07/01/2022    Diabetic foot exam  10/20/2022    A1C test (Diabetic or Prediabetic)  10/26/2022    Colorectal Cancer Screen  12/10/2024    Cervical cancer screen  09/29/2025    Pneumococcal 0-64 years Vaccine (2 of 2 - PPSV23) 09/01/2026    DTaP/Tdap/Td vaccine (2 - Td or Tdap) 06/17/2029    Flu vaccine  Completed    COVID-19 Vaccine  Completed    Hepatitis A vaccine  Aged Out    Hib vaccine  Aged Out    Meningococcal (ACWY) vaccine  Aged Out             (applicable per patient's age: Cancer Screenings, Depression Screening, Fall Risk Screening, Immunizations)    Hemoglobin A1C (%)   Date Value   09/30/2020 7.4   06/30/2020 7.3   10/30/2019 6.8     LDL Cholesterol (mg/dL)   Date Value   05/06/2021 37     LDL Calculated (mg/dL)   Date Value   10/30/2019 61.8     AST (U/L)   Date Value   05/06/2021 48 (H)     ALT (U/L)   Date Value   05/06/2021 47 (H)     BUN (mg/dL)   Date Value   05/06/2021 20      (goal A1C is < 7)   (goal LDL is <100) need 30-50% reduction from baseline     BP Readings from Last 3 Encounters:   01/20/22 (!) 140/78   10/20/21 138/84   04/21/21 122/80    (goal /80)      All Future Testing planned in [facility]:  Lab Frequency Next Occurrence   ALT Once 05/10/2022   AST Once 05/10/2022       Next Visit Date:  Future Appointments   Date Time Provider Rasta Smith   4/18/2022  9:00 AM WILMER Macias - CNP [facility]            Patient Active Problem List:     Family history of colon cancer in father     Colon polyp Diabetic ketoacidosis without coma associated with type 1 diabetes mellitus (HCC)     Mild malnutrition ([facility] [address]     Frequent PVCs     DM (diabetes mellitus), type 1 ([facility] [address]     Sepsis ([facility] [address]     UTI due to extended-spectrum beta lactamase (ESBL) producing Escherichia coli     Other constipation     Chronic cholecystitis     S/P cholecystectomy     Shortness of breath     Abnormal resting ECG findings     Mild aortic stenosis by prior echocardiogram     Bacteremia due to Escherichia coli     Abnormal result of other cardiovascular function study     History of colon polyps     DDD (degenerative disc disease), cervical     Lichen sclerosus of female genitalia     Hypertension     Attention deficit disorder     Primary insomnia          PATIENT STATES THEY EXTENDED THEIR TRIP IN [place]. REQUESTING A REFILL UNTIL THEY RETURN. COMING BACK IN ABOUT 2 WEEKS.

## 2022-03-26 DIAGNOSIS — E10.69 TYPE 1 DIABETES MELLITUS WITH OTHER SPECIFIED COMPLICATION (HCC): ICD-10-CM

## 2022-04-12 ENCOUNTER — TELEPHONE (OUTPATIENT)
Dept: FAMILY MEDICINE CLINIC | Age: 62
End: 2022-04-12

## 2022-04-12 ASSESSMENT — PATIENT HEALTH QUESTIONNAIRE - PHQ9
1. LITTLE INTEREST OR PLEASURE IN DOING THINGS: 0
SUM OF ALL RESPONSES TO PHQ QUESTIONS 1-9: 0
SUM OF ALL RESPONSES TO PHQ QUESTIONS 1-9: 0
2. FEELING DOWN, DEPRESSED OR HOPELESS: 0
SUM OF ALL RESPONSES TO PHQ QUESTIONS 1-9: 0
SUM OF ALL RESPONSES TO PHQ9 QUESTIONS 1 & 2: 0
SUM OF ALL RESPONSES TO PHQ QUESTIONS 1-9: 0

## 2022-05-10 DIAGNOSIS — F98.8 ATTENTION DEFICIT DISORDER, UNSPECIFIED HYPERACTIVITY PRESENCE: ICD-10-CM

## 2022-05-10 RX ORDER — DEXTROAMPHETAMINE SACCHARATE, AMPHETAMINE ASPARTATE MONOHYDRATE, DEXTROAMPHETAMINE SULFATE AND AMPHETAMINE SULFATE 6.25; 6.25; 6.25; 6.25 MG/1; MG/1; MG/1; MG/1
CAPSULE, EXTENDED RELEASE ORAL
Qty: 60 CAPSULE | Refills: 0 | OUTPATIENT
Start: 2022-05-10

## 2022-05-10 NOTE — TELEPHONE ENCOUNTER
Patient is due for her 3 month controlled substance visit. I believe she no-showed her last. Rx denied.

## 2022-05-12 ENCOUNTER — TELEPHONE (OUTPATIENT)
Dept: FAMILY MEDICINE CLINIC | Age: 62
End: 2022-05-12

## 2022-05-12 DIAGNOSIS — Z12.31 ENCOUNTER FOR SCREENING MAMMOGRAM FOR MALIGNANT NEOPLASM OF BREAST: Primary | ICD-10-CM

## 2022-05-12 NOTE — TELEPHONE ENCOUNTER
----- Message from Elizabeth Jefferson sent at 5/12/2022  9:33 AM EDT -----  Subject: Referral Request    QUESTIONS   Reason for referral request? pt states that when she was in last the   provider mentioned she needed a mammogram done. I am sending her to test   scheduling however she needs an order in her chart please. If provider   doesn't want this done please advise pt. Thanks. Has the physician seen you for this condition before? No   Preferred Specialist (if applicable)? Do you already have an appointment scheduled? No  Additional Information for Provider?   ---------------------------------------------------------------------------  --------------  CALL BACK INFO  What is the best way for the office to contact you? OK to leave message on   voicemail  Preferred Call Back Phone Number? [phone]  ---------------------------------------------------------------------------  --------------  SCRIPT ANSWERS  Relationship to Patient?  Self

## 2022-05-17 ENCOUNTER — OFFICE VISIT (OUTPATIENT)
Dept: FAMILY MEDICINE CLINIC | Age: 62
End: 2022-05-17
Payer: COMMERCIAL

## 2022-05-17 ENCOUNTER — HOSPITAL ENCOUNTER (OUTPATIENT)
Age: 62
Discharge: HOME | End: 2022-05-17
Payer: COMMERCIAL

## 2022-05-17 VITALS
RESPIRATION RATE: 14 BRPM | HEART RATE: 67 BPM | HEIGHT: 65 IN | DIASTOLIC BLOOD PRESSURE: 76 MMHG | OXYGEN SATURATION: 100 % | BODY MASS INDEX: 27.82 KG/M2 | WEIGHT: 167 LBS | SYSTOLIC BLOOD PRESSURE: 124 MMHG

## 2022-05-17 DIAGNOSIS — Z13.220 SCREENING CHOLESTEROL LEVEL: ICD-10-CM

## 2022-05-17 DIAGNOSIS — G56.03 BILATERAL CARPAL TUNNEL SYNDROME: ICD-10-CM

## 2022-05-17 DIAGNOSIS — M79.644 FINGER PAIN, RIGHT: ICD-10-CM

## 2022-05-17 DIAGNOSIS — I10 PRIMARY HYPERTENSION: ICD-10-CM

## 2022-05-17 DIAGNOSIS — E10.69 TYPE 1 DIABETES MELLITUS WITH OTHER SPECIFIED COMPLICATION (HCC): ICD-10-CM

## 2022-05-17 DIAGNOSIS — F98.8 ATTENTION DEFICIT DISORDER, UNSPECIFIED HYPERACTIVITY PRESENCE: Primary | ICD-10-CM

## 2022-05-17 DIAGNOSIS — M79.89 SWELLING OF INDEX FINGER: ICD-10-CM

## 2022-05-17 LAB
ALT SERPL-CCNC: 22 U/L (ref 5–33)
ANION GAP SERPL CALCULATED.3IONS-SCNC: 8 MMOL/L (ref 9–17)
AST SERPL-CCNC: 19 U/L
BUN BLDV-MCNC: 22 MG/DL (ref 8–23)
BUN/CREAT BLD: 28 (ref 9–20)
C-REACTIVE PROTEIN: <3 MG/L (ref 0–5)
CALCIUM SERPL-MCNC: 9.5 MG/DL (ref 8.6–10.4)
CHLORIDE BLD-SCNC: 103 MMOL/L (ref 98–107)
CHOLESTEROL/HDL RATIO: 1.5
CHOLESTEROL: 177 MG/DL
CO2: 28 MMOL/L (ref 20–31)
CREAT SERPL-MCNC: 0.79 MG/DL (ref 0.5–0.9)
CREATININE URINE: 175.1 MG/DL (ref 28–217)
GFR AFRICAN AMERICAN: >60 ML/MIN
GFR NON-AFRICAN AMERICAN: >60 ML/MIN
GFR SERPL CREATININE-BSD FRML MDRD: ABNORMAL ML/MIN/{1.73_M2}
GFR SERPL CREATININE-BSD FRML MDRD: ABNORMAL ML/MIN/{1.73_M2}
GLUCOSE BLD-MCNC: 203 MG/DL (ref 70–99)
HCT VFR BLD CALC: 43.3 % (ref 36.3–47.1)
HDLC SERPL-MCNC: 118 MG/DL
HEMOGLOBIN: 14.1 G/DL (ref 11.9–15.1)
LDL CHOLESTEROL: 52 MG/DL (ref 0–130)
MCH RBC QN AUTO: 31.9 PG (ref 25.2–33.5)
MCHC RBC AUTO-ENTMCNC: 32.6 G/DL (ref 28.4–34.8)
MCV RBC AUTO: 98 FL (ref 82.6–102.9)
MICROALBUMIN/CREAT 24H UR: 22 MG/L
MICROALBUMIN/CREAT UR-RTO: 13 MCG/MG CREAT
NRBC AUTOMATED: 0 PER 100 WBC
PDW BLD-RTO: 12.2 % (ref 11.8–14.4)
PLATELET # BLD: 291 K/UL (ref 138–453)
PMV BLD AUTO: 9 FL (ref 8.1–13.5)
POTASSIUM SERPL-SCNC: 4.4 MMOL/L (ref 3.7–5.3)
RBC # BLD: 4.42 M/UL (ref 3.95–5.11)
RHEUMATOID FACTOR: 13.7 IU/ML
SEDIMENTATION RATE, ERYTHROCYTE: 0 MM/HR (ref 0–30)
SODIUM BLD-SCNC: 139 MMOL/L (ref 135–144)
TRIGL SERPL-MCNC: 37 MG/DL
WBC # BLD: 7.3 K/UL (ref 3.5–11.3)

## 2022-05-17 PROCEDURE — 85027 COMPLETE CBC AUTOMATED: CPT

## 2022-05-17 PROCEDURE — 86431 RHEUMATOID FACTOR QUANT: CPT

## 2022-05-17 PROCEDURE — 84450 TRANSFERASE (AST) (SGOT): CPT

## 2022-05-17 PROCEDURE — 99214 OFFICE O/P EST MOD 30 MIN: CPT | Performed by: NURSE PRACTITIONER

## 2022-05-17 PROCEDURE — 80061 LIPID PANEL: CPT

## 2022-05-17 PROCEDURE — 86140 C-REACTIVE PROTEIN: CPT

## 2022-05-17 PROCEDURE — 80048 BASIC METABOLIC PNL TOTAL CA: CPT

## 2022-05-17 PROCEDURE — 36415 COLL VENOUS BLD VENIPUNCTURE: CPT

## 2022-05-17 PROCEDURE — 84460 ALANINE AMINO (ALT) (SGPT): CPT

## 2022-05-17 PROCEDURE — 82570 ASSAY OF URINE CREATININE: CPT

## 2022-05-17 PROCEDURE — 85652 RBC SED RATE AUTOMATED: CPT

## 2022-05-17 PROCEDURE — 83036 HEMOGLOBIN GLYCOSYLATED A1C: CPT

## 2022-05-17 PROCEDURE — 82043 UR ALBUMIN QUANTITATIVE: CPT

## 2022-05-17 RX ORDER — MELOXICAM 15 MG/1
15 TABLET ORAL DAILY PRN
Qty: 30 TABLET | Refills: 2 | Status: SHIPPED | OUTPATIENT
Start: 2022-05-17 | End: 2022-08-18

## 2022-05-17 RX ORDER — DEXTROAMPHETAMINE SACCHARATE, AMPHETAMINE ASPARTATE MONOHYDRATE, DEXTROAMPHETAMINE SULFATE AND AMPHETAMINE SULFATE 6.25; 6.25; 6.25; 6.25 MG/1; MG/1; MG/1; MG/1
CAPSULE, EXTENDED RELEASE ORAL
Qty: 60 CAPSULE | Refills: 0 | Status: SHIPPED | OUTPATIENT
Start: 2022-05-17 | End: 2022-06-15

## 2022-05-17 SDOH — ECONOMIC STABILITY: FOOD INSECURITY: WITHIN THE PAST 12 MONTHS, THE FOOD YOU BOUGHT JUST DIDN'T LAST AND YOU DIDN'T HAVE MONEY TO GET MORE.: NEVER TRUE

## 2022-05-17 SDOH — ECONOMIC STABILITY: FOOD INSECURITY: WITHIN THE PAST 12 MONTHS, YOU WORRIED THAT YOUR FOOD WOULD RUN OUT BEFORE YOU GOT MONEY TO BUY MORE.: NEVER TRUE

## 2022-05-17 ASSESSMENT — PATIENT HEALTH QUESTIONNAIRE - PHQ9
SUM OF ALL RESPONSES TO PHQ QUESTIONS 1-9: 0
SUM OF ALL RESPONSES TO PHQ9 QUESTIONS 1 & 2: 0
2. FEELING DOWN, DEPRESSED OR HOPELESS: 0
1. LITTLE INTEREST OR PLEASURE IN DOING THINGS: 0
SUM OF ALL RESPONSES TO PHQ QUESTIONS 1-9: 0

## 2022-05-17 ASSESSMENT — SOCIAL DETERMINANTS OF HEALTH (SDOH): HOW HARD IS IT FOR YOU TO PAY FOR THE VERY BASICS LIKE FOOD, HOUSING, MEDICAL CARE, AND HEATING?: NOT HARD AT ALL

## 2022-05-17 NOTE — PROGRESS NOTES
Name: Raj Parkinson  : 1960         Chief Complaint:     Chief Complaint   Patient presents with    Finger Pain     right hand, index finger into thumb and wrist. painful. swollen finger, difficult to bend. trouble grabbing. History of Present Illness:      Raj Parkinson is a 62 y.o.  female who presents with Finger Pain (right hand, index finger into thumb and wrist. painful. swollen finger, difficult to bend. trouble grabbing. )      HPI     ADD:  Patient confirms she is taking adderall XR 25mg BID.  Admits benefit with this medication in regards to her focus. She admits continued tongue thrusting that she notices more in the evening. She is having improved focus while on this medication. She denies concerns with palpitations or insomnia. Carpal Tunnel:  She completed EMG  showing concerns for carpal tunnel. She has trialed wrist splints without relief. At her most recent appointment 2022 she was referred to Dr. Kyle Presley [facility]) orthopedics and did not follow up with them. She notes worsening function in her first two fingers of her right hand. Pain is severe when she uses her hand, and has become more severe in the last 3-4 months. She has tried ibuprofen and voltaren with no relief. She has tried ice with no relief. She does note swelling to her second finger of the right hand. She has noticed decreased function and limited ability to complete tasks. She notes dropping items. She does note numbness and tingling in hands bilaterally. Past Medical History:     Past Medical History:   Diagnosis Date    Diabetes mellitus ([facility] [address]     H/O cardiovascular stress test 2017    Largerly normal moyocardial perfusion imaging with a soft tissue artifact, but without evidence of significant myocardial ischemia or infarction. EF 66%. The pt Flores treadmill score is 4 which correlates with a low/intermiediate risk for CAD.  History of Holter monitoring 2018    Very frequent PVC's.  No symptoms were reported. Very frequent premature ventricular ectopic beats total 5610 consisting of 5610 isolated PVC's.  History of Holter monitoring 02/15/2018    Very frequent PVC's. No symptoms were reported. Consider additional testing and/or treatment if clinically indicated.  Hypertension     PONV (postoperative nausea and vomiting)       Reviewed all health maintenance requirements and ordered appropriate tests  Health Maintenance Due   Topic Date Due    HIV screen  Never done    Hepatitis C screen  Never done    Shingles vaccine (2 of 2) 12/04/2019    Diabetic microalbuminuria test  10/30/2020    Diabetic retinal exam  02/03/2022    Lipids  05/06/2022       Past Surgical History:     Past Surgical History:   Procedure Laterality Date    CARDIAC CATHETERIZATION Left 10/25/2018    right radial/MHT/Dr. Chilel Alberjulian    CHOLECYSTECTOMY  09/20/2018    CHOLECYSTECTOMY LAPAROSCOPIC ROBOTIC (N/A )    COLONOSCOPY  01/11/2017    -polyp(adenomatous polyp)    COLONOSCOPY  12/10/2019    -normal    COLONOSCOPY N/A 12/10/2019    COLORECTAL CANCER SCREENING, HIGH RISK performed by González Voss MD at [address] Bilateral 2007    PAIN MANAGEMENT PROCEDURE N/A 3/24/2020    EPIDURAL STEROID INJECTION-CERVICAL performed by Jennifer Osorio MD at [address] OFFICE/OUTPT [address] N/A 9/20/2018    CHOLECYSTECTOMY LAPAROSCOPIC ROBOTIC performed by Sean Summers DO at [address] Left 2009    TUBAL LIGATION  1993        Medications:       Prior to Admission medications    Medication Sig Start Date End Date Taking? Authorizing Provider   meloxicam (MOBIC) 15 MG tablet Take 1 tablet by mouth daily as needed for Pain 5/17/22  Yes WILMER Ojeda - CNP   amphetamine-dextroamphetamine (ADDERALL XR) 25 MG extended release capsule Take one capsule by mouth in the morning. Repeat second dose as needed in the afternoon.  5/17/22 6/17/22 Yes Alva Duncan WILMER Joe CNP   insulin aspart (NOVOLOG) 100 UNIT/ML injection vial INJECT VIA PUMP INSTRUCTION 3/28/22  Yes WILMER Reyes CNP   EPINEPHrine (EPIPEN) 0.3 MG/0.3ML SOAJ injection INJECT 0.3 ML INTO THE MUSCLE FOR 1 DOSE FOR ALLERGIC REACTION 2/2/22  Yes WILMER Reyes CNP   Continuous Blood Gluc Transmit (DEXCOM G6 TRANSMITTER) MISC 1 each by Other route daily 4/28/21  Yes Historical Provider, MD   traZODone (DESYREL) 100 MG tablet Take 1 tablet by mouth nightly 10/20/21  Yes WILMER Reyes CNP   dilTIAZem (CARDIZEM CD) 360 MG extended release capsule TAKE 1 CAPSULE BY MOUTH ONCE DAILY 8/4/21  Yes WILMER Reyes CNP   enalapril (VASOTEC) 2.5 MG tablet TAKE 1 TABLET BY MOUTH ONCE DAILY 5/26/21  Yes WILMER Reyes CNP   atorvastatin (LIPITOR) 10 MG tablet TAKE 1 TABLET BY MOUTH ONCE DAILY 5/26/21  Yes WILMER Reyes CNP   conjugated estrogens (PREMARIN) 0.625 MG/GM vaginal cream Place vaginally daily x2 weeks. Then place vaginally two times per week. 9/29/20  Yes WILMER Reyes CNP   fluocinonide (LIDEX) 0.05 % cream Apply topically 2 times daily. 9/29/20  Yes WILMER Reyes CNP Natural Products (Marcus So COMPLEX/MSM PO) Take by mouth daily   Yes Historical Provider, MD   BIOTIN PO Take by mouth daily   Yes Historical Provider, MD   clobetasol (TEMOVATE) 0.05 % ointment Apply topically once daily at night x 4 weeks. Then apply topically once every other day x 4 weeks. Apply topically twice a week x 4 weeks. 8/21/20  Yes WILMER Reyes CNP   Bacillus Coagulans-Inulin (ALIGN PREBIOTIC-PROBIOTIC PO) Take 1 tablet by mouth daily   Yes Historical Provider, MD   Psyllium (METAMUCIL PO) Take by mouth daily   Yes Historical Provider, MD        Allergies:       Bee venom and Sulfa antibiotics    Social History:     Tobacco:    reports that she quit smoking about 22 years ago. She has a 1.25 pack-year smoking history.  She has never used smokeless tobacco.  Alcohol:      reports current alcohol use. Drug Use:  reports no history of drug use. Family History:     Family History   Problem Relation Age of Onset    Heart Attack Brother 48       Review of Systems:     Positive and Negative as described in HPI    Review of Systems   Musculoskeletal: Positive for arthralgias and myalgias. Physical Exam:   Vitals:  /76   Pulse 67   Resp 14   Ht 5' 5\" (1.651 m)   Wt 167 lb (75.8 kg)   SpO2 100%   BMI 27.79 kg/m²     Physical Exam  Constitutional:       General: She is not in acute distress. Appearance: Normal appearance. She is normal weight. She is not ill-appearing or toxic-appearing. HENT:      Head: Normocephalic. Cardiovascular:      Rate and Rhythm: Normal rate and regular rhythm. Heart sounds: Normal heart sounds. No murmur heard. Pulmonary:      Effort: Pulmonary effort is normal. No respiratory distress. Breath sounds: Normal breath sounds. No stridor. No wheezing, rhonchi or rales. Chest:      Chest wall: Tenderness:    Musculoskeletal:      Comments: Right upper extremity: Cap refill less than 2 seconds. Radial pulse 2+. Significant loss of ROM in right second finger with flexion and extension, swelling over PIP joint. Skin:     General: Skin is warm. Neurological:      Mental Status: She is alert and oriented to person, place, and time. Psychiatric:         Mood and Affect: Mood normal.         Behavior: Behavior normal.         Thought Content:  Thought content normal.         Judgment: Judgment normal.         Data:     Lab Results   Component Value Date     05/17/2022    K 4.4 05/17/2022     05/17/2022    CO2 28 05/17/2022    BUN 22 05/17/2022    CREATININE 0.79 05/17/2022    GLUCOSE 203 05/17/2022    PROT 6.1 09/25/2018    LABALBU 4.2 10/30/2019    BILITOT 0.60 10/30/2019    ALKPHOS 80.0 10/30/2019    AST 19 05/17/2022    ALT 22 05/17/2022     Lab Results Component Value Date    WBC 7.3 05/17/2022    RBC 4.42 05/17/2022    HGB 14.1 05/17/2022    HCT 43.3 05/17/2022    MCV 98.0 05/17/2022    MCH 31.9 05/17/2022    MCHC 32.6 05/17/2022    RDW 12.2 05/17/2022     05/17/2022    MPV 9.0 05/17/2022     Lab Results   Component Value Date    TSH 1.57 05/06/2021     Lab Results   Component Value Date    CHOL 157 05/06/2021    CHOL 193 02/20/2017     05/06/2021    LABA1C 7.4 09/30/2020       Assessment/Plan:      Diagnosis Orders   1. Attention deficit disorder, unspecified hyperactivity presence  amphetamine-dextroamphetamine (ADDERALL XR) 25 MG extended release capsule   2. Type 1 diabetes mellitus with other specified complication (HCC)  Hemoglobin A1C    Microalbumin, Ur   3. Screening cholesterol level  Lipid Panel   4. Primary hypertension  ALT    AST    Basic Metabolic Panel    CBC   5. Swelling of index finger  CBC    C-Reactive Protein    Sedimentation Rate    Rheumatoid Factor   6. Finger pain, right  CBC    C-Reactive Protein    Sedimentation Rate    Rheumatoid Factor   7. Bilateral carpal tunnel syndrome         ADD:   - Continue Adderall 25 mg twice daily  - Refill supplied today    Carpal tunnel:  - Completed EMG 2020, results abnormal  - Referral to Dr. Loren Reaves (orthopedics) placed 1/2022. I encouraged her to schedule an appointment to discuss further  - She has failed nightly wrist splints, symptoms worsening    Right second finger swelling and pain:  - Carpal tunnel versus tendinitis versus rheumatoid arthritis versus osteoarthritis  - Continue ice  - Further evaluation with CRP, sed rate, CBC, rheumatoid factor  - Mobic 15 mg daily prescribed today. Take with food.   Avoid administration with other NSAIDs  - Follow-up with Dr. Loren Reaves (orthopedics)  - Follow-up 4 weeks or sooner with any concerns    Wellness:   - Routine labs ordered today including A1c and urine microalbumin with history of DM    Completed Refills   Requested Prescriptions Signed Prescriptions Disp Refills    meloxicam (MOBIC) 15 MG tablet 30 tablet 2     Sig: Take 1 tablet by mouth daily as needed for Pain    amphetamine-dextroamphetamine (ADDERALL XR) 25 MG extended release capsule 60 capsule 0     Sig: Take one capsule by mouth in the morning. Repeat second dose as needed in the afternoon. Orders Placed This Encounter   Procedures    ALT     Standing Status:   Future     Number of Occurrences:   1     Standing Expiration Date:   5/17/2023    AST     Standing Status:   Future     Number of Occurrences:   1     Standing Expiration Date:   5/17/2023    Basic Metabolic Panel     Standing Status:   Future     Number of Occurrences:   1     Standing Expiration Date:   5/17/2023    CBC     Standing Status:   Future     Number of Occurrences:   1     Standing Expiration Date:   5/17/2023    Hemoglobin A1C     Standing Status:   Future     Number of Occurrences:   1     Standing Expiration Date:   5/17/2023    Lipid Panel     Standing Status:   Future     Number of Occurrences:   1     Standing Expiration Date:   5/17/2023     Order Specific Question:   Is Patient Fasting?/# of Hours     Answer:   not fasting    Microalbumin, Ur     Standing Status:   Future     Number of Occurrences:   1     Standing Expiration Date:   5/17/2023    C-Reactive Protein     Standing Status:   Future     Number of Occurrences:   1     Standing Expiration Date:   5/17/2023    Sedimentation Rate     Standing Status:   Future     Number of Occurrences:   1     Standing Expiration Date:   5/17/2023    Rheumatoid Factor     Standing Status:   Future     Number of Occurrences:   1     Standing Expiration Date:   5/17/2023        No results found for this visit on 05/17/22. Return in about 3 months (around 8/17/2022), or if symptoms worsen or fail to improve, for wellness + ADD f/u .     Electronically signed by Lisabeth Osgood, APRN - CNP on 05/17/22 at 12:41 PM.

## 2022-05-17 NOTE — PATIENT INSTRUCTIONS
SURVEY:    You may be receiving a survey from [employer] regarding your visit today. Please complete the survey to enable us to provide the highest quality of care to you and your family. If you cannot score us a very good on any question, please call the office to discuss how we could of made your experience a very good one. Thank you.

## 2022-05-18 LAB
ESTIMATED AVERAGE GLUCOSE: 169 MG/DL
HBA1C MFR BLD: 7.5 % (ref 4–6)

## 2022-05-19 ENCOUNTER — HOSPITAL ENCOUNTER (OUTPATIENT)
Dept: WOMENS IMAGING | Age: 62
Discharge: HOME | End: 2022-05-21
Payer: COMMERCIAL

## 2022-05-19 ENCOUNTER — HOSPITAL ENCOUNTER (OUTPATIENT)
Age: 62
Discharge: HOME | End: 2022-05-19
Payer: COMMERCIAL

## 2022-05-19 ENCOUNTER — OFFICE VISIT (OUTPATIENT)
Dept: FAMILY MEDICINE CLINIC | Age: 62
End: 2022-05-19
Payer: COMMERCIAL

## 2022-05-19 VITALS
WEIGHT: 167.38 LBS | SYSTOLIC BLOOD PRESSURE: 130 MMHG | TEMPERATURE: 98 F | HEIGHT: 65 IN | DIASTOLIC BLOOD PRESSURE: 60 MMHG | HEART RATE: 68 BPM | OXYGEN SATURATION: 97 % | BODY MASS INDEX: 27.89 KG/M2

## 2022-05-19 DIAGNOSIS — N30.90 CYSTITIS: ICD-10-CM

## 2022-05-19 DIAGNOSIS — N30.90 CYSTITIS: Primary | ICD-10-CM

## 2022-05-19 DIAGNOSIS — Z12.31 ENCOUNTER FOR SCREENING MAMMOGRAM FOR MALIGNANT NEOPLASM OF BREAST: ICD-10-CM

## 2022-05-19 LAB
-: ABNORMAL
BILIRUBIN URINE: NEGATIVE
COLOR: YELLOW
EPITHELIAL CELLS UA: ABNORMAL /HPF (ref 0–25)
GLUCOSE URINE: ABNORMAL
KETONES, URINE: NEGATIVE
LEUKOCYTE ESTERASE, URINE: NEGATIVE
NITRITE, URINE: NEGATIVE
PH UA: 6 (ref 5–9)
PROTEIN UA: NEGATIVE
RBC UA: ABNORMAL /HPF (ref 0–2)
SPECIFIC GRAVITY UA: <1.005 (ref 1.01–1.02)
TURBIDITY: CLEAR
URINE HGB: NEGATIVE
UROBILINOGEN, URINE: NORMAL
WBC UA: ABNORMAL /HPF (ref 0–5)

## 2022-05-19 PROCEDURE — 77063 BREAST TOMOSYNTHESIS BI: CPT

## 2022-05-19 PROCEDURE — 81001 URINALYSIS AUTO W/SCOPE: CPT

## 2022-05-19 PROCEDURE — 87086 URINE CULTURE/COLONY COUNT: CPT

## 2022-05-19 PROCEDURE — 99213 OFFICE O/P EST LOW 20 MIN: CPT | Performed by: NURSE PRACTITIONER

## 2022-05-19 RX ORDER — NITROFURANTOIN 25; 75 MG/1; MG/1
100 CAPSULE ORAL 2 TIMES DAILY
Qty: 10 CAPSULE | Refills: 0 | Status: SHIPPED | OUTPATIENT
Start: 2022-05-19 | End: 2022-05-24

## 2022-05-19 ASSESSMENT — ENCOUNTER SYMPTOMS
VOMITING: 0
DIARRHEA: 0

## 2022-05-19 NOTE — PROGRESS NOTES
History:     Past Surgical History:   Procedure Laterality Date    CARDIAC CATHETERIZATION Left 10/25/2018    right radial/MHT/Dr. Olman Fry    CHOLECYSTECTOMY  09/20/2018    CHOLECYSTECTOMY LAPAROSCOPIC ROBOTIC (N/A )    COLONOSCOPY  01/11/2017    -polyp(adenomatous polyp)    COLONOSCOPY  12/10/2019    -normal    COLONOSCOPY N/A 12/10/2019    COLORECTAL CANCER SCREENING, HIGH RISK performed by Jeffrey Heimlich, MD at [address] Bilateral 2007    PAIN MANAGEMENT PROCEDURE N/A 3/24/2020    EPIDURAL STEROID INJECTION-CERVICAL performed by Rayna Pugh MD at [address] [facility] [address] OFF[address]OUTPT [address] N/A 9/20/2018    CHOLECYSTECTOMY LAPAROSCOPIC ROBOTIC performed by Maite Beckman DO at [address] Left 2009    TUBAL LIGATION  1993        Medications:       Prior to Admission medications    Medication Sig Start Date End Date Taking? Authorizing Provider   nitrofurantoin, macrocrystal-monohydrate, (MACROBID) 100 MG capsule Take 1 capsule by mouth 2 times daily for 5 days 5/19/22 5/24/22 Yes Yung Record, APRN - CNP   meloxicam (MOBIC) 15 MG tablet Take 1 tablet by mouth daily as needed for Pain 5/17/22  Yes Yung Record, APRN - CNP   amphetamine-dextroamphetamine (ADDERALL XR) 25 MG extended release capsule Take one capsule by mouth in the morning. Repeat second dose as needed in the afternoon.  5/17/22 6/17/22 Yes Yung Record, APRN - CNP   insulin aspart (NOVOLOG) 100 UNIT/ML injection vial INJECT VIA PUMP INSTRUCTION 3/28/22  Yes Yung Record, APRN - CNP   EPINEPHrine (EPIPEN) 0.3 MG/0.3ML SOAJ injection INJECT 0.3 ML INTO THE MUSCLE FOR 1 DOSE FOR ALLERGIC REACTION 2/2/22  Yes Yung Record, APRN - CNP   Continuous Blood Gluc Transmit (DEXCOM G6 TRANSMITTER) MISC 1 each by Other route daily 4/28/21  Yes Historical Provider, MD   traZODone (DESYREL) 100 MG tablet Take 1 tablet by mouth nightly 10/20/21  Yes Yung Record, APRN - CNP dilTIAZem (CARDIZEM CD) 360 MG extended release capsule TAKE 1 CAPSULE BY MOUTH ONCE DAILY 8/4/21  Yes WILMER Patino CNP   enalapril (VASOTEC) 2.5 MG tablet TAKE 1 TABLET BY MOUTH ONCE DAILY 5/26/21  Yes WILMER Patino CNP   atorvastatin (LIPITOR) 10 MG tablet TAKE 1 TABLET BY MOUTH ONCE DAILY 5/26/21  Yes WILMER Patino CNP   Misc Natural Products (Lashay Les COMPLEX/MSM PO) Take by mouth daily   Yes Historical Provider, MD   BIOTIN PO Take by mouth daily   Yes Historical Provider, MD   clobetasol (TEMOVATE) 0.05 % ointment Apply topically once daily at night x 4 weeks. Then apply topically once every other day x 4 weeks. Apply topically twice a week x 4 weeks. 8/21/20  Yes WILMER Patino CNP   Bacillus Coagulans-Inulin (ALIGN PREBIOTIC-PROBIOTIC PO) Take 1 tablet by mouth daily   Yes Historical Provider, MD   Psyllium (METAMUCIL PO) Take by mouth daily   Yes Historical Provider, MD   conjugated estrogens (PREMARIN) 0.625 MG/GM vaginal cream Place vaginally daily x2 weeks. Then place vaginally two times per week. Patient not taking: Reported on 5/19/2022 9/29/20   WILMER Patino CNP   fluocinonide (LIDEX) 0.05 % cream Apply topically 2 times daily. Patient not taking: Reported on 5/19/2022 9/29/20   WILMER Patino CNP        Allergies:       Bee venom and Sulfa antibiotics    Social History:     Tobacco:    reports that she quit smoking about 22 years ago. She has a 1.25 pack-year smoking history. She has never used smokeless tobacco.  Alcohol:      reports current alcohol use. Drug Use:  reports no history of drug use. Family History:     Family History   Problem Relation Age of Onset    Heart Attack Brother 48       Review of Systems:     Positive and Negative as described in HPI    Review of Systems   Constitutional: Negative for chills and fever. Gastrointestinal: Negative for diarrhea and vomiting.    Genitourinary: Positive for dysuria, frequency and urgency. Negative for flank pain and hematuria. Physical Exam:   Vitals:  /60   Pulse 68   Temp 98 °F (36.7 °C)   Ht 5' 5\" (1.651 m)   Wt 167 lb 6 oz (75.9 kg)   SpO2 97%   BMI 27.85 kg/m²     Physical Exam  Constitutional:       General: She is not in acute distress. Appearance: Normal appearance. She is normal weight. She is not ill-appearing or toxic-appearing. HENT:      Head: Normocephalic. Cardiovascular:      Rate and Rhythm: Normal rate and regular rhythm. Heart sounds: Normal heart sounds. No murmur heard. Pulmonary:      Effort: Pulmonary effort is normal. No respiratory distress. Breath sounds: Normal breath sounds. No stridor. No wheezing, rhonchi or rales. Abdominal:      General: Abdomen is flat. Bowel sounds are normal. There is no distension. Palpations: Abdomen is soft. There is no mass. Tenderness: There is no abdominal tenderness. There is no right CVA tenderness, left CVA tenderness or guarding. Hernia: No hernia is present. Neurological:      Mental Status: She is alert and oriented to person, place, and time. Psychiatric:         Mood and Affect: Mood normal.         Behavior: Behavior normal.         Thought Content:  Thought content normal.         Judgment: Judgment normal.         Data:     Lab Results   Component Value Date     05/17/2022    K 4.4 05/17/2022     05/17/2022    CO2 28 05/17/2022    BUN 22 05/17/2022    CREATININE 0.79 05/17/2022    GLUCOSE 203 05/17/2022    PROT 6.1 09/25/2018    LABALBU 4.2 10/30/2019    BILITOT 0.60 10/30/2019    ALKPHOS 80.0 10/30/2019    AST 19 05/17/2022    ALT 22 05/17/2022     Lab Results   Component Value Date    WBC 7.3 05/17/2022    RBC 4.42 05/17/2022    HGB 14.1 05/17/2022    HCT 43.3 05/17/2022    MCV 98.0 05/17/2022    MCH 31.9 05/17/2022    MCHC 32.6 05/17/2022    RDW 12.2 05/17/2022     05/17/2022    MPV 9.0 05/17/2022     Lab Results   Component Value Date    TSH 1.57 05/06/2021     Lab Results   Component Value Date    CHOL 177 05/17/2022    CHOL 193 02/20/2017     05/17/2022    LABA1C 7.5 05/17/2022       Assessment/Plan:      Diagnosis Orders   1. Cystitis  Urinalysis with Microscopic    Culture, Urine      POC UA in office negative leukocytes, nitrates, protein, blood. Positive glucose.  She is on day 2 of Macrobid which is likely altering her UA results   Will treat based on symptoms. Updated Macrobid prescription 100 mg twice daily x5 days sent to pharmacy.  Complete UA with culture given her extensive history of ESBL pyelonephritis and sepsis that required infectious disease involvement.  Reviewed at length worsening signs and symptoms and when to notify office and/or seek immediate care   Reviewed lab work from 5/17/2022. A1c elevated 7.5%. Will forward to the patient's endocrinologist as they manage her insulin pump settings     Completed Refills   Requested Prescriptions     Signed Prescriptions Disp Refills    nitrofurantoin, macrocrystal-monohydrate, (MACROBID) 100 MG capsule 10 capsule 0     Sig: Take 1 capsule by mouth 2 times daily for 5 days       Orders Placed This Encounter   Procedures    Culture, Urine     Standing Status:   Future     Number of Occurrences:   1     Standing Expiration Date:   5/19/2023     Order Specific Question:   Specify (ex-cath, midstream, cysto, etc)? Answer:   midstream    Urinalysis with Microscopic     Standing Status:   Future     Number of Occurrences:   1     Standing Expiration Date:   5/19/2023     Order Specific Question:   SPECIFY(EX-CATH,MIDSTREAM,CYSTO,ETC)? Answer:   midstream        No results found for this visit on 05/19/22. Return if symptoms worsen or fail to improve.     Electronically signed by WILMER Breaux CNP on 05/19/22 at 12:18 PM.

## 2022-05-19 NOTE — PATIENT INSTRUCTIONS
SURVEY:    You may be receiving a survey from [employer] regarding your visit today. Please complete the survey to enable us to provide the highest quality of care to you and your family. If you cannot score us a very good on any question, please call the office to discuss how we could of made your experience a very good one. Thank you.

## 2022-05-20 LAB
CULTURE: NO GROWTH
SPECIMEN DESCRIPTION: NORMAL

## 2022-05-23 ENCOUNTER — TELEPHONE (OUTPATIENT)
Dept: FAMILY MEDICINE CLINIC | Age: 62
End: 2022-05-23

## 2022-05-23 NOTE — TELEPHONE ENCOUNTER
----- Message from WILMER Mulligan CNP sent at 5/20/2022  2:27 PM EDT -----  Please notify urine shows continuation of glucose. No growth in culture. Continue macrobid as prescribed.  Notify office with continued/worsening symptoms

## 2022-06-13 RX ORDER — ENALAPRIL MALEATE 2.5 MG/1
TABLET ORAL
Qty: 90 TABLET | Refills: 3 | Status: SHIPPED | OUTPATIENT
Start: 2022-06-13

## 2022-06-13 RX ORDER — ATORVASTATIN CALCIUM 10 MG/1
TABLET, FILM COATED ORAL
Qty: 90 TABLET | Refills: 3 | Status: SHIPPED | OUTPATIENT
Start: 2022-06-13

## 2022-06-13 RX ORDER — INSULIN ASPART 100 [IU]/ML
INJECTION, SOLUTION INTRAVENOUS; SUBCUTANEOUS
Qty: 30 ML | Refills: 0 | Status: SHIPPED | OUTPATIENT
Start: 2022-06-13 | End: 2022-08-18

## 2022-06-15 DIAGNOSIS — F98.8 ATTENTION DEFICIT DISORDER, UNSPECIFIED HYPERACTIVITY PRESENCE: ICD-10-CM

## 2022-06-15 RX ORDER — DEXTROAMPHETAMINE SACCHARATE, AMPHETAMINE ASPARTATE MONOHYDRATE, DEXTROAMPHETAMINE SULFATE AND AMPHETAMINE SULFATE 6.25; 6.25; 6.25; 6.25 MG/1; MG/1; MG/1; MG/1
CAPSULE, EXTENDED RELEASE ORAL
Qty: 60 CAPSULE | Refills: 0 | Status: SHIPPED | OUTPATIENT
Start: 2022-06-15 | End: 2022-07-21

## 2022-06-24 ENCOUNTER — HOSPITAL ENCOUNTER (OUTPATIENT)
Age: 62
Discharge: HOME | End: 2022-06-24
Payer: COMMERCIAL

## 2022-06-24 LAB
EKG ATRIAL RATE: 76 BPM
EKG P AXIS: 61 DEGREES
EKG P-R INTERVAL: 126 MS
EKG Q-T INTERVAL: 400 MS
EKG QRS DURATION: 80 MS
EKG QTC CALCULATION (BAZETT): 450 MS
EKG R AXIS: 14 DEGREES
EKG T AXIS: 41 DEGREES
EKG VENTRICULAR RATE: 76 BPM

## 2022-06-24 PROCEDURE — 93005 ELECTROCARDIOGRAM TRACING: CPT | Performed by: ORTHOPAEDIC SURGERY

## 2022-06-24 PROCEDURE — 93010 ELECTROCARDIOGRAM REPORT: CPT | Performed by: INTERNAL MEDICINE

## 2022-06-27 PROBLEM — E11.3293 MILD NONPROLIFERATIVE DIABETIC RETINOPATHY OF BOTH EYES WITHOUT MACULAR EDEMA ASSOCIATED WITH TYPE 2 DIABETES MELLITUS (HCC): Status: ACTIVE | Noted: 2022-06-27

## 2022-07-20 DIAGNOSIS — F98.8 ATTENTION DEFICIT DISORDER, UNSPECIFIED HYPERACTIVITY PRESENCE: ICD-10-CM

## 2022-07-21 RX ORDER — DEXTROAMPHETAMINE SACCHARATE, AMPHETAMINE ASPARTATE MONOHYDRATE, DEXTROAMPHETAMINE SULFATE AND AMPHETAMINE SULFATE 6.25; 6.25; 6.25; 6.25 MG/1; MG/1; MG/1; MG/1
CAPSULE, EXTENDED RELEASE ORAL
Qty: 60 CAPSULE | Refills: 0 | Status: SHIPPED | OUTPATIENT
Start: 2022-07-21 | End: 2022-08-18

## 2022-08-18 DIAGNOSIS — F98.8 ATTENTION DEFICIT DISORDER, UNSPECIFIED HYPERACTIVITY PRESENCE: ICD-10-CM

## 2022-08-18 RX ORDER — INSULIN ASPART 100 [IU]/ML
INJECTION, SOLUTION INTRAVENOUS; SUBCUTANEOUS
Qty: 30 ML | Refills: 0 | Status: SHIPPED | OUTPATIENT
Start: 2022-08-18

## 2022-08-18 RX ORDER — MELOXICAM 15 MG/1
15 TABLET ORAL DAILY PRN
Qty: 30 TABLET | Refills: 0 | Status: SHIPPED | OUTPATIENT
Start: 2022-08-18

## 2022-08-18 RX ORDER — DEXTROAMPHETAMINE SACCHARATE, AMPHETAMINE ASPARTATE MONOHYDRATE, DEXTROAMPHETAMINE SULFATE AND AMPHETAMINE SULFATE 6.25; 6.25; 6.25; 6.25 MG/1; MG/1; MG/1; MG/1
CAPSULE, EXTENDED RELEASE ORAL
Qty: 60 CAPSULE | Refills: 0 | Status: SHIPPED | OUTPATIENT
Start: 2022-08-18 | End: 2022-09-18

## 2022-09-17 DIAGNOSIS — F98.8 ATTENTION DEFICIT DISORDER, UNSPECIFIED HYPERACTIVITY PRESENCE: ICD-10-CM

## 2022-09-19 RX ORDER — DEXTROAMPHETAMINE SACCHARATE, AMPHETAMINE ASPARTATE MONOHYDRATE, DEXTROAMPHETAMINE SULFATE AND AMPHETAMINE SULFATE 6.25; 6.25; 6.25; 6.25 MG/1; MG/1; MG/1; MG/1
CAPSULE, EXTENDED RELEASE ORAL
Qty: 60 CAPSULE | Refills: 0 | OUTPATIENT
Start: 2022-09-19

## 2022-10-24 RX ORDER — INSULIN ASPART 100 [IU]/ML
INJECTION, SOLUTION INTRAVENOUS; SUBCUTANEOUS
Qty: 30 ML | Refills: 0 | OUTPATIENT
Start: 2022-10-24

## 2022-10-27 ENCOUNTER — TELEPHONE (OUTPATIENT)
Dept: PRIMARY CARE CLINIC | Age: 62
End: 2022-10-27

## 2022-10-27 RX ORDER — NIRMATRELVIR AND RITONAVIR 300-100 MG
KIT ORAL
Qty: 30 TABLET | Refills: 0 | Status: SHIPPED | OUTPATIENT
Start: 2022-10-27 | End: 2022-11-01

## 2022-10-27 NOTE — TELEPHONE ENCOUNTER
The patient's , Dr. Charlette Mohs, called the office with concern that the patient tested positive for COVID this morning. She started yesterday with symptoms including fever (T-max 100.4) and cough. Denies shortness of breath. No pulse oximeter at home. She is fully vaccinated. She does not supplement with zinc, vitamin C, or vitamin D. They are questioning Paxlovid and I am agreeable to this given her comorbidities of DM type I and hypertension. She has no known CKD. Most recent creatinine 5/17/2022 0.79. Will prescribe Paxlovid. Strict return precautions reviewed including worsening signs and symptoms such as shortness of breath, fever, etc.  and when to seek immediate care at the emergency department.   I encourage follow-up in office if symptoms worsen or persist.

## 2022-12-02 ENCOUNTER — HOSPITAL ENCOUNTER (OUTPATIENT)
Age: 62
Discharge: HOME | End: 2022-12-02
Payer: COMMERCIAL

## 2022-12-02 LAB
ALBUMIN SERPL-MCNC: 4.6 G/DL (ref 3.5–5.2)
ALBUMIN/GLOBULIN RATIO: 2.2 (ref 1–2.5)
ALP BLD-CCNC: 92 U/L (ref 35–104)
ALT SERPL-CCNC: 30 U/L (ref 5–33)
ANION GAP SERPL CALCULATED.3IONS-SCNC: 9 MMOL/L (ref 9–17)
AST SERPL-CCNC: 29 U/L
BILIRUB SERPL-MCNC: 0.5 MG/DL (ref 0.3–1.2)
BUN BLDV-MCNC: 21 MG/DL (ref 8–23)
BUN/CREAT BLD: 24 (ref 9–20)
CALCIUM SERPL-MCNC: 9.7 MG/DL (ref 8.6–10.4)
CHLORIDE BLD-SCNC: 103 MMOL/L (ref 98–107)
CHOLESTEROL/HDL RATIO: 1.5
CHOLESTEROL: 162 MG/DL
CO2: 27 MMOL/L (ref 20–31)
CREAT SERPL-MCNC: 0.87 MG/DL (ref 0.5–0.9)
GFR SERPL CREATININE-BSD FRML MDRD: >60 ML/MIN/1.73M2
GLUCOSE BLD-MCNC: 156 MG/DL (ref 70–99)
HDLC SERPL-MCNC: 110 MG/DL
LDL CHOLESTEROL: 45 MG/DL (ref 0–130)
POTASSIUM SERPL-SCNC: 5.2 MMOL/L (ref 3.7–5.3)
SODIUM BLD-SCNC: 139 MMOL/L (ref 135–144)
THYROXINE, FREE: 1.27 NG/DL (ref 0.93–1.7)
TOTAL PROTEIN: 6.7 G/DL (ref 6.4–8.3)
TRIGL SERPL-MCNC: 34 MG/DL
TSH SERPL DL<=0.05 MIU/L-ACNC: 1.23 UIU/ML (ref 0.3–5)

## 2022-12-02 PROCEDURE — 84443 ASSAY THYROID STIM HORMONE: CPT

## 2022-12-02 PROCEDURE — 80061 LIPID PANEL: CPT

## 2022-12-02 PROCEDURE — 84439 ASSAY OF FREE THYROXINE: CPT

## 2022-12-02 PROCEDURE — 80053 COMPREHEN METABOLIC PANEL: CPT

## 2022-12-02 PROCEDURE — 36415 COLL VENOUS BLD VENIPUNCTURE: CPT

## 2022-12-03 ENCOUNTER — HOSPITAL ENCOUNTER (OUTPATIENT)
Age: 62
Setting detail: SPECIMEN
Discharge: HOME | End: 2022-12-03
Payer: COMMERCIAL

## 2022-12-03 LAB
CREATININE URINE: 106.9 MG/DL (ref 28–217)
MICROALBUMIN/CREAT 24H UR: <12 MG/L
MICROALBUMIN/CREAT UR-RTO: NORMAL MCG/MG CREAT

## 2022-12-03 PROCEDURE — 82043 UR ALBUMIN QUANTITATIVE: CPT

## 2022-12-03 PROCEDURE — 82570 ASSAY OF URINE CREATININE: CPT

## 2023-01-05 DIAGNOSIS — F98.8 ATTENTION DEFICIT DISORDER, UNSPECIFIED HYPERACTIVITY PRESENCE: ICD-10-CM

## 2023-01-05 RX ORDER — DEXTROAMPHETAMINE SACCHARATE, AMPHETAMINE ASPARTATE MONOHYDRATE, DEXTROAMPHETAMINE SULFATE AND AMPHETAMINE SULFATE 6.25; 6.25; 6.25; 6.25 MG/1; MG/1; MG/1; MG/1
CAPSULE, EXTENDED RELEASE ORAL
Qty: 60 CAPSULE | Refills: 0 | Status: CANCELLED | OUTPATIENT
Start: 2023-01-05 | End: 2023-02-05

## 2023-01-05 RX ORDER — MELOXICAM 15 MG/1
15 TABLET ORAL DAILY PRN
Qty: 30 TABLET | Refills: 0 | Status: CANCELLED | OUTPATIENT
Start: 2023-01-05

## 2023-01-05 NOTE — TELEPHONE ENCOUNTER
----- Message from Venkat Nelson sent at 1/5/2023  4:38 PM EST -----  Subject: Refill Request    QUESTIONS  Name of Medication? amphetamine-dextroamphetamine (ADDERALL XR) 25 MG   extended release capsule  Patient-reported dosage and instructions? Twice a day  How many days do you have left? 1  Preferred Pharmacy? [id number][id number]  Pharmacy phone number (if available)? [phone]  Additional Information for Provider? Pt is scheduled for a med follow up   on 1/31.   ---------------------------------------------------------------------------  --------------,  Name of Medication? meloxicam (MOBIC) 15 MG tablet  Patient-reported dosage and instructions? TAKE 1 TABLET BY MOUTH DAILY AS   NEEDED FOR PAIN  How many days do you have left? 5  Preferred Pharmacy? [id number] #[id number]  Pharmacy phone number (if available)? [phone]  Additional Information for Provider? Pt is scheduled for a med follow up   on 1/31.   ---------------------------------------------------------------------------  --------------  CALL BACK INFO  What is the best way for the office to contact you? OK to leave message on   voicemail  Preferred Call Back Phone Number? [phone]  ---------------------------------------------------------------------------  --------------  SCRIPT ANSWERS  Relationship to Patient?  Self

## 2023-01-06 ENCOUNTER — TELEPHONE (OUTPATIENT)
Dept: PRIMARY CARE CLINIC | Age: 63
End: 2023-01-06

## 2023-01-06 DIAGNOSIS — F98.8 ATTENTION DEFICIT DISORDER, UNSPECIFIED HYPERACTIVITY PRESENCE: ICD-10-CM

## 2023-01-06 RX ORDER — DEXTROAMPHETAMINE SACCHARATE, AMPHETAMINE ASPARTATE MONOHYDRATE, DEXTROAMPHETAMINE SULFATE AND AMPHETAMINE SULFATE 6.25; 6.25; 6.25; 6.25 MG/1; MG/1; MG/1; MG/1
CAPSULE, EXTENDED RELEASE ORAL
Qty: 20 CAPSULE | Refills: 0 | OUTPATIENT
Start: 2023-01-06 | End: 2023-02-06

## 2023-01-06 RX ORDER — MELOXICAM 15 MG/1
15 TABLET ORAL DAILY PRN
Qty: 15 TABLET | Refills: 0 | Status: SHIPPED | OUTPATIENT
Start: 2023-01-06

## 2023-01-06 NOTE — TELEPHONE ENCOUNTER
Patient has upcoming appt.  1/31/2023    NO SHOW 8/30/22  Last seen 6/21 finger f/u  Last seen for ADD 5/17/22  NO SHOW 4/18/22    Last filled   Adderall 30 day supply 8/18/22  Mobic 30 day supply 8/18/22      Meds pended for SHORT dose to make it to next appt IF Approved

## 2023-01-06 NOTE — TELEPHONE ENCOUNTER
----- Message from Philip Rodriguez sent at 1/5/2023  4:38 PM EST -----  Subject: Refill Request    QUESTIONS  Name of Medication? amphetamine-dextroamphetamine (ADDERALL XR) 25 MG   extended release capsule  Patient-reported dosage and instructions? Twice a day  How many days do you have left? 1  Preferred Pharmacy? [id number] #[id number]  Pharmacy phone number (if available)? [phone]  Additional Information for Provider? Pt is scheduled for a med follow up   on 1/31.   ---------------------------------------------------------------------------  --------------,  Name of Medication? meloxicam (MOBIC) 15 MG tablet  Patient-reported dosage and instructions? TAKE 1 TABLET BY MOUTH DAILY AS   NEEDED FOR PAIN  How many days do you have left? 5  Preferred Pharmacy? [id number] #[id number]  Pharmacy phone number (if available)? [phone]  Additional Information for Provider? Pt is scheduled for a med follow up   on 1/31.   ---------------------------------------------------------------------------  --------------  CALL BACK INFO  What is the best way for the office to contact you? OK to leave message on   voicemail  Preferred Call Back Phone Number? [phone]  ---------------------------------------------------------------------------  --------------  SCRIPT ANSWERS  Relationship to Patient?  Self

## 2023-01-06 NOTE — TELEPHONE ENCOUNTER
----- Message from Fidel Recinos sent at 1/5/2023  4:38 PM EST -----  Subject: Refill Request    QUESTIONS  Name of Medication? amphetamine-dextroamphetamine (ADDERALL XR) 25 MG   extended release capsule  Patient-reported dosage and instructions? Twice a day  How many days do you have left? 1  Preferred Pharmacy? [facility] #[id number]  Pharmacy phone number (if available)? [phone]  Additional Information for Provider? Pt is scheduled for a med follow up   on 1/31.   ---------------------------------------------------------------------------  --------------,  Name of Medication? meloxicam (MOBIC) 15 MG tablet  Patient-reported dosage and instructions? TAKE 1 TABLET BY MOUTH DAILY AS   NEEDED FOR PAIN  How many days do you have left? 5  Preferred Pharmacy? [facility] #[id number]  Pharmacy phone number (if available)? [phone]  Additional Information for Provider? Pt is scheduled for a med follow up   on 1/31.   ---------------------------------------------------------------------------  --------------  CALL BACK INFO  What is the best way for the office to contact you? OK to leave message on   voicemail  Preferred Call Back Phone Number? [phone]  ---------------------------------------------------------------------------  --------------  SCRIPT ANSWERS  Relationship to Patient?  Self

## 2023-01-09 DIAGNOSIS — F98.8 ATTENTION DEFICIT DISORDER, UNSPECIFIED HYPERACTIVITY PRESENCE: ICD-10-CM

## 2023-01-09 RX ORDER — DEXTROAMPHETAMINE SACCHARATE, AMPHETAMINE ASPARTATE MONOHYDRATE, DEXTROAMPHETAMINE SULFATE AND AMPHETAMINE SULFATE 6.25; 6.25; 6.25; 6.25 MG/1; MG/1; MG/1; MG/1
CAPSULE, EXTENDED RELEASE ORAL
Qty: 22 CAPSULE | Refills: 0 | Status: SHIPPED | OUTPATIENT
Start: 2023-01-09 | End: 2023-02-09

## 2023-02-01 ENCOUNTER — OFFICE VISIT (OUTPATIENT)
Dept: PRIMARY CARE CLINIC | Age: 63
End: 2023-02-01
Payer: COMMERCIAL

## 2023-02-01 VITALS
WEIGHT: 167 LBS | OXYGEN SATURATION: 98 % | SYSTOLIC BLOOD PRESSURE: 100 MMHG | HEIGHT: 65 IN | RESPIRATION RATE: 18 BRPM | TEMPERATURE: 96.3 F | HEART RATE: 63 BPM | DIASTOLIC BLOOD PRESSURE: 60 MMHG | BODY MASS INDEX: 27.82 KG/M2

## 2023-02-01 DIAGNOSIS — G47.19 DAYTIME HYPERSOMNOLENCE: ICD-10-CM

## 2023-02-01 DIAGNOSIS — Z13.220 LIPID SCREENING: ICD-10-CM

## 2023-02-01 DIAGNOSIS — I10 PRIMARY HYPERTENSION: ICD-10-CM

## 2023-02-01 DIAGNOSIS — Z00.00 WELLNESS EXAMINATION: ICD-10-CM

## 2023-02-01 DIAGNOSIS — E10.69 TYPE 1 DIABETES MELLITUS WITH OTHER SPECIFIED COMPLICATION (HCC): Primary | ICD-10-CM

## 2023-02-01 DIAGNOSIS — F98.8 ATTENTION DEFICIT DISORDER, UNSPECIFIED HYPERACTIVITY PRESENCE: ICD-10-CM

## 2023-02-01 PROCEDURE — 3078F DIAST BP <80 MM HG: CPT | Performed by: NURSE PRACTITIONER

## 2023-02-01 PROCEDURE — 3074F SYST BP LT 130 MM HG: CPT | Performed by: NURSE PRACTITIONER

## 2023-02-01 PROCEDURE — 99214 OFFICE O/P EST MOD 30 MIN: CPT | Performed by: NURSE PRACTITIONER

## 2023-02-01 RX ORDER — TRAZODONE HYDROCHLORIDE 100 MG/1
150 TABLET ORAL NIGHTLY
Qty: 135 TABLET | Refills: 3 | Status: SHIPPED | OUTPATIENT
Start: 2023-02-01

## 2023-02-01 RX ORDER — DEXTROAMPHETAMINE SACCHARATE, AMPHETAMINE ASPARTATE, DEXTROAMPHETAMINE SULFATE AND AMPHETAMINE SULFATE 7.5; 7.5; 7.5; 7.5 MG/1; MG/1; MG/1; MG/1
30 TABLET ORAL 2 TIMES DAILY
Qty: 60 TABLET | Refills: 0 | Status: SHIPPED | OUTPATIENT
Start: 2023-02-01 | End: 2023-02-01 | Stop reason: DRUGHIGH

## 2023-02-01 RX ORDER — DEXTROAMPHETAMINE SACCHARATE, AMPHETAMINE ASPARTATE MONOHYDRATE, DEXTROAMPHETAMINE SULFATE AND AMPHETAMINE SULFATE 6.25; 6.25; 6.25; 6.25 MG/1; MG/1; MG/1; MG/1
25 CAPSULE, EXTENDED RELEASE ORAL 2 TIMES DAILY
Qty: 60 CAPSULE | Refills: 0 | Status: SHIPPED | OUTPATIENT
Start: 2023-02-01 | End: 2023-03-03

## 2023-02-01 RX ORDER — HYDROXYZINE HYDROCHLORIDE 10 MG/1
10 TABLET, FILM COATED ORAL 3 TIMES DAILY PRN
Qty: 30 TABLET | Refills: 3 | Status: SHIPPED | OUTPATIENT
Start: 2023-02-01 | End: 2023-02-11

## 2023-02-01 SDOH — ECONOMIC STABILITY: FOOD INSECURITY: WITHIN THE PAST 12 MONTHS, YOU WORRIED THAT YOUR FOOD WOULD RUN OUT BEFORE YOU GOT MONEY TO BUY MORE.: NEVER TRUE

## 2023-02-01 SDOH — ECONOMIC STABILITY: HOUSING INSECURITY
IN THE LAST 12 MONTHS, WAS THERE A TIME WHEN YOU DID NOT HAVE A STEADY PLACE TO SLEEP OR SLEPT IN A SHELTER (INCLUDING NOW)?: NO

## 2023-02-01 SDOH — ECONOMIC STABILITY: INCOME INSECURITY: HOW HARD IS IT FOR YOU TO PAY FOR THE VERY BASICS LIKE FOOD, HOUSING, MEDICAL CARE, AND HEATING?: NOT HARD AT ALL

## 2023-02-01 SDOH — ECONOMIC STABILITY: FOOD INSECURITY: WITHIN THE PAST 12 MONTHS, THE FOOD YOU BOUGHT JUST DIDN'T LAST AND YOU DIDN'T HAVE MONEY TO GET MORE.: NEVER TRUE

## 2023-02-01 ASSESSMENT — PATIENT HEALTH QUESTIONNAIRE - PHQ9
2. FEELING DOWN, DEPRESSED OR HOPELESS: 0
1. LITTLE INTEREST OR PLEASURE IN DOING THINGS: 0
SUM OF ALL RESPONSES TO PHQ QUESTIONS 1-9: 0
SUM OF ALL RESPONSES TO PHQ QUESTIONS 1-9: 0
SUM OF ALL RESPONSES TO PHQ9 QUESTIONS 1 & 2: 0
SUM OF ALL RESPONSES TO PHQ QUESTIONS 1-9: 0
SUM OF ALL RESPONSES TO PHQ QUESTIONS 1-9: 0

## 2023-02-01 NOTE — PROGRESS NOTES
Name: Blaise Menezes  : 1960         Chief Complaint:     Chief Complaint   Patient presents with    ADHD     Aderall 25mg. States she would like it to be increased. Not working as well. Diabetes     Following with endocrinology: yes, most recent appt 2022. Per patient A1c 2022 7.1  Diabetic diet/low carb diet compliance: follows diabetic diet compliance   Current exercise: walking on treadmill  Hypoglycemic episodes: no  Last eye exam: 2022  Current symptoms: Denies  excessive thirst, or increased frequency of urination. Medication Therapy: insulin pump       History of Present Illness:      Blaise Menezes is a 65 y.o.  female who presents with ADHD (Aderall 25mg. States she would like it to be increased. Not working as well. ) and Diabetes (Following with endocrinology: yes, most recent appt 2022. Per patient A1c 2022 7. 1/Diabetic diet/low carb diet compliance: follows diabetic diet compliance /Current exercise: walking on treadmill/Hypoglycemic episodes: no/Last eye exam: 2022/Current symptoms: Denies  excessive thirst, or increased frequency of urination. /Medication Therapy: insulin pump)      HPI    ADHD:  Current treatment includes adderall XR 25mg BID. She is requesting to go back up to adderall 30mg XR BID as she was on this regimen prior. She notices lapses in her focus. She is starting to notice increased distractibility. She states she is helping her . She states she \"does not feel depressed\". She states she is needing to manage a lot of things at home. Insomnia:  She is taking trazodone 150mg QHS with benefit. She admits waking up at night with difficulty falling back asleep. Admits snoring. Admits daytime fatigue, worse within the last 6 months. DM:  Following with endocrinology: yes, most recent appointment 2022.  Per patient A1c 2022 7.1%  Diabetic diet/low carb diet: following diabetic diet   Current exercise: walking on treadmill   Hypoglycemic episodes: none  Last eye exam: 6/2022  Current symptoms: denies excessive thirst or increased frequency or urination   Medication therapy: insulin pump, novolog. Managed by endocrinology     Past Medical History:     Past Medical History:   Diagnosis Date    ADHD     Degenerative disc disease, cervical     Diabetes mellitus ([address]     Has insulin pump    H/O cardiovascular stress test 11/14/2017    Largerly normal moyocardial perfusion imaging with a soft tissue artifact, but without evidence of significant myocardial ischemia or infarction. EF 66%. The pt Flores treadmill score is 4 which correlates with a low/intermiediate risk for CAD. History of Holter monitoring 02/16/2018    Very frequent PVC's. No symptoms were reported. Very frequent premature ventricular ectopic beats total 5610 consisting of 5610 isolated PVC's. History of Holter monitoring 02/15/2018    Very frequent PVC's. No symptoms were reported. Consider additional testing and/or treatment if clinically indicated.      Hypertension     PONV (postoperative nausea and vomiting)       Reviewed all health maintenance requirements and ordered appropriate tests  Health Maintenance Due   Topic Date Due    HIV screen  Never done    Hepatitis C screen  Never done    Shingles vaccine (2 of 2) 12/04/2019    COVID-19 Vaccine (4 - Booster for Wadie Olmos series) 01/14/2022       Past Surgical History:     Past Surgical History:   Procedure Laterality Date    CARDIAC CATHETERIZATION Left 10/25/2018    right radial/[facility]/Dr. Katharine Boyd    CARPAL TUNNEL RELEASE Right 6/30/2022    CARPAL TUNNEL RELEASE ENDOSCOPIC performed by Liz Lopez MD at [facility]  09/20/2018    CHOLECYSTECTOMY LAPAROSCOPIC ROBOTIC (N/A )    COLONOSCOPY  01/11/2017    -polyp(adenomatous polyp)    COLONOSCOPY  12/10/2019    -normal    COLONOSCOPY N/A 12/10/2019    COLORECTAL CANCER SCREENING, HIGH RISK performed by Reinier Hernandez MD at [address] Bilateral 2007    PAIN MANAGEMENT PROCEDURE N/A 03/24/2020    EPIDURAL STEROID INJECTION-CERVICAL performed by Kumar Lorenzo MD at [address] OFFICE/OUTPT [facility] N/A 09/20/2018    CHOLECYSTECTOMY LAPAROSCOPIC ROBOTIC performed by Mariel Suarez DO at [address] Left 2009    TUBAL LIGATION  1993        Medications:       Prior to Admission medications    Medication Sig Start Date End Date Taking? Authorizing Provider   traZODone (DESYREL) 100 MG tablet Take 1.5 tablets by mouth nightly 2/1/23  Yes WILMER Denney CNP   hydrOXYzine HCl (ATARAX) 10 MG tablet Take 1 tablet by mouth 3 times daily as needed for Anxiety . Caution as this may cause drowsiness. Do not use in conjunction with trazodone. 2/1/23 2/11/23 Yes WILMER Denney CNP   amphetamine-dextroamphetamine (ADDERALL XR) 25 MG extended release capsule Take 1 capsule by mouth 2 times daily for 30 days.  Max Daily Amount: 50 mg 2/1/23 3/3/23 Yes WILMER Denney CNP   meloxicam (MOBIC) 15 MG tablet Take 1 tablet by mouth daily as needed for Pain 1/6/23  Yes WILMER Denney CNP   enalapril (VASOTEC) 2.5 MG tablet TAKE 1 TABLET BY MOUTH ONCE A DAY 6/13/22  Yes WILMER Denney CNP   atorvastatin (LIPITOR) 10 MG tablet TAKE 1 TABLET BY MOUTH ONCE A DAY 6/13/22  Yes WILMER Denney CNP   insulin aspart (NOVOLOG) 100 UNIT/ML injection vial INJECT VIA PUMP INSTRUCTION 3/28/22  Yes WILMER Denney CNP   EPINEPHrine (EPIPEN) 0.3 MG/0.3ML SOAJ injection INJECT 0.3 ML INTO THE MUSCLE FOR 1 DOSE FOR ALLERGIC REACTION 2/2/22  Yes WILMER Denney CNP   Continuous Blood Gluc Transmit (DEXCOM G6 TRANSMITTER) MISC 1 each by Other route daily 4/28/21  Yes Historical Provider, MD   dilTIAZem (CARDIZEM CD) 360 MG extended release capsule TAKE 1 CAPSULE BY MOUTH ONCE DAILY 8/4/21  Yes Avonne Ethel, APRN - CNP   Bacillus Coagulans-Inulin (ALIGN PREBIOTIC-PROBIOTIC PO) Take 1 tablet by mouth daily   Yes Historical Provider, MD   Psyllium (METAMUCIL PO) Take by mouth daily   Yes Historical Provider, MD   conjugated estrogens (PREMARIN) 0.625 MG/GM vaginal cream Place vaginally daily x2 weeks. Then place vaginally two times per week. Patient not taking: No sig reported 9/29/20   WILMER Alvarez CNP   fluocinonide (LIDEX) 0.05 % cream Apply topically 2 times daily. Patient not taking: No sig reported 9/29/20   WILMER Alvarez CNP   clobetasol (TEMOVATE) 0.05 % ointment Apply topically once daily at night x 4 weeks. Then apply topically once every other day x 4 weeks. Apply topically twice a week x 4 weeks. Patient not taking: Reported on 2/1/2023 8/21/20   WILMER Alvarez CNP        Allergies:       Bee venom and Sulfa antibiotics    Social History:     Tobacco:    reports that she has been smoking. She has a 1.25 pack-year smoking history. She has never used smokeless tobacco.  Alcohol:      reports current alcohol use. Drug Use:  reports no history of drug use. Family History:     Family History   Problem Relation Age of Onset    Heart Attack Brother 48    Heart Disease Mother     Cancer Father        Review of Systems:     Positive and Negative as described in HPI    Review of Systems   Constitutional:  Positive for fatigue. Endocrine: Negative for polydipsia and polyuria. Psychiatric/Behavioral:  Positive for decreased concentration and sleep disturbance. Negative for dysphoric mood (denies feeling depressed). The patient is nervous/anxious (Denies daytime stress. Admits situational stress. ). Physical Exam:   Vitals:  /60   Pulse 63   Temp (!) 96.3 °F (35.7 °C)   Resp 18   Ht 5' 4.5\" (1.638 m)   Wt 167 lb (75.8 kg)   SpO2 98%   BMI 28.22 kg/m²     Physical Exam  Constitutional:       General: She is not in acute distress. Appearance: Normal appearance. She is normal weight. She is not ill-appearing or toxic-appearing.    Cardiovascular:      Rate and Rhythm: Normal rate and regular rhythm.      Pulses:           Dorsalis pedis pulses are 2+ on the right side and 2+ on the left side.        Posterior tibial pulses are 2+ on the right side and 2+ on the left side.      Heart sounds: Normal heart sounds. No murmur heard.  Pulmonary:      Effort: Pulmonary effort is normal. No respiratory distress.      Breath sounds: Normal breath sounds. No stridor. No wheezing, rhonchi or rales.   Feet:      Right foot:      Protective Sensation: 6 sites tested.  6 sites sensed.      Skin integrity: No ulcer, blister, skin breakdown, erythema, warmth, callus, dry skin or fissure.      Toenail Condition: Right toenails are normal.      Left foot:      Protective Sensation: 6 sites tested.  6 sites sensed.      Skin integrity: No ulcer, blister, skin breakdown, erythema, warmth, callus, dry skin or fissure.      Toenail Condition: Left toenails are normal.      Comments: WNL monofilament and vibratory sense bilaterally  Neurological:      Mental Status: She is alert.   Psychiatric:         Mood and Affect: Mood normal.         Behavior: Behavior normal.         Thought Content: Thought content normal.         Judgment: Judgment normal.       Data:     Lab Results   Component Value Date/Time     12/02/2022 11:56 AM    K 5.2 12/02/2022 11:56 AM     12/02/2022 11:56 AM    CO2 27 12/02/2022 11:56 AM    BUN 21 12/02/2022 11:56 AM    CREATININE 0.87 12/02/2022 11:56 AM    GLUCOSE 156 12/02/2022 11:56 AM    PROT 6.7 12/02/2022 11:56 AM    LABALBU 4.6 12/02/2022 11:56 AM    BILITOT 0.5 12/02/2022 11:56 AM    ALKPHOS 92 12/02/2022 11:56 AM    AST 29 12/02/2022 11:56 AM    ALT 30 12/02/2022 11:56 AM     Lab Results   Component Value Date/Time    WBC 7.3 05/17/2022 08:50 AM    RBC 4.42 05/17/2022 08:50 AM    HGB 14.1 05/17/2022 08:50 AM    HCT 43.3 05/17/2022 08:50 AM    MCV 98.0 05/17/2022 08:50 AM    MCH 31.9 05/17/2022 08:50 AM    MCHC 32.6 05/17/2022 08:50 AM     RDW 12.2 05/17/2022 08:50 AM     05/17/2022 08:50 AM    MPV 9.0 05/17/2022 08:50 AM     Lab Results   Component Value Date/Time    TSH 1.23 12/02/2022 11:56 AM     Lab Results   Component Value Date/Time    CHOL 162 12/02/2022 11:56 AM    CHOL 193 02/20/2017 12:00 AM     12/02/2022 11:56 AM    LABA1C 7.5 05/17/2022 08:50 AM       Assessment/Plan:      Diagnosis Orders   1. Type 1 diabetes mellitus with other specified complication (HCC)  Diabetic Foot Exam    Hemoglobin A1C      2. Wellness examination  CBC    Comprehensive Metabolic Panel    Hemoglobin A1C    Lipid Panel      3. Primary hypertension  CBC    Comprehensive Metabolic Panel      4. Lipid screening  Lipid Panel      5. Daytime hypersomnolence  Sleep Study with PAP Titration    Baseline Diagnostic Sleep Study    Home Sleep Study      6. Attention deficit disorder, unspecified hyperactivity presence  amphetamine-dextroamphetamine (ADDERALL XR) 25 MG extended release capsule    DISCONTINUED: amphetamine-dextroamphetamine (ADDERALL, 30MG,) 30 MG tablet        Insomnia:  - Patient self-increased trazodone from 100 mg to 150 mg nightly and is doing well with this. Continue on this dose  - Patient does note feeling extremely tired throughout the day and snoring at night. Sleep studies ordered    Mood:  - Discussed counseling, daily medication, as needed medication at length  - Patient is agreeable to as needed medication.  - Trial Atarax 10 mg 3 times daily as needed. Counseled on potential side effect such as drowsiness.   Educated not to take in conjunction with trazodone as this could cause significant drowsiness.  - Notify office with any concerns    ADHD:  - Patient requesting to go back to her original dose of Adderall XR from 25 mg twice daily to 30 mg twice daily  -- Discussed typical max daily dose of 40mg QD, occasionally 60mg BID if needed  -- Rx adderall XR 30mg BID prescribed  -- F/u every 3 months for controlled substance monitoring  - Notify office with any concerns    DM:  -- Stable  - Following with endocrinology  - Using NovoLog per insulin pump as managed by endocrinology  - Continue diabetic diet  - Foot exam WNL today  - Eye exam UTD 6/2022    Concerns with weight loss:  - Patient requesting medication to assist with weight loss. - She is agreeable to see Dr. Tano Obrien to discuss further     Addendum 2/1/23:  -- Pharmacy is unable to dispense adderall XR 30mg BID d/t medication shortage. Patient is agreeable to tentative refill of adderall XR 25mg BID x30 days     Completed Refills   Requested Prescriptions     Signed Prescriptions Disp Refills    traZODone (DESYREL) 100 MG tablet 135 tablet 3     Sig: Take 1.5 tablets by mouth nightly    hydrOXYzine HCl (ATARAX) 10 MG tablet 30 tablet 3     Sig: Take 1 tablet by mouth 3 times daily as needed for Anxiety . Caution as this may cause drowsiness. Do not use in conjunction with trazodone. amphetamine-dextroamphetamine (ADDERALL XR) 25 MG extended release capsule 60 capsule 0     Sig: Take 1 capsule by mouth 2 times daily for 30 days.  Max Daily Amount: 50 mg       Orders Placed This Encounter   Procedures    CBC     Standing Status:   Future     Standing Expiration Date:   2/1/2024    Comprehensive Metabolic Panel     Standing Status:   Future     Standing Expiration Date:   2/1/2024    Hemoglobin A1C     Standing Status:   Future     Standing Expiration Date:   2/1/2024    Lipid Panel     Standing Status:   Future     Standing Expiration Date:   2/1/2024    Diabetic Foot Exam    Sleep Study with PAP Titration     Standing Status:   Future     Standing Expiration Date:   8/1/2024     Order Specific Question:   Sleep Study Titration Type     Answer:   CPAP     Order Specific Question:   Location For Sleep Study     Answer:   [place]     Order Specific Question:   Select a sleep lab location     Answer:   [facility]    Baseline Diagnostic Sleep Study     Standing Status: Future     Standing Expiration Date:   8/1/2024     Order Specific Question:   Adult or Pediatric     Answer:   Adult Study (>7 Years)     Order Specific Question:   Location For Sleep Study     Answer:   [place]     Order Specific Question:   Select a sleep lab location     Answer:   [id number] Sleep Study     Standing Status:   Future     Standing Expiration Date:   8/1/2024     Order Specific Question:   Location For Sleep Study     Answer:   [place]     Order Specific Question:   Select a sleep lab location     Answer:   [facility]        No results found for this visit on 02/01/23. Return in about 3 months (around 5/1/2023), or if symptoms worsen or fail to improve, for wellness + ADHD f/u .     Electronically signed by Cheryal Osler, APRN - CNP on 02/02/23 at 5:45 AM.

## 2023-02-01 NOTE — PATIENT INSTRUCTIONS
SURVEY:    You may be receiving a survey from [employer] regarding your visit today. Please complete the survey to enable us to provide the highest quality of care to you and your family. If you cannot score us a very good on any question, please call the office to discuss how we could of made your experience a very good one. Thank you.

## 2023-02-03 ENCOUNTER — OFFICE VISIT (OUTPATIENT)
Dept: PRIMARY CARE CLINIC | Age: 63
End: 2023-02-03

## 2023-02-03 VITALS
SYSTOLIC BLOOD PRESSURE: 136 MMHG | DIASTOLIC BLOOD PRESSURE: 68 MMHG | BODY MASS INDEX: 27.66 KG/M2 | OXYGEN SATURATION: 97 % | WEIGHT: 166 LBS | HEIGHT: 65 IN | HEART RATE: 82 BPM

## 2023-02-03 DIAGNOSIS — E66.3 OVERWEIGHT (BMI 25.0-29.9): ICD-10-CM

## 2023-02-03 DIAGNOSIS — R63.5 WEIGHT GAIN: Primary | ICD-10-CM

## 2023-02-03 RX ORDER — TOPIRAMATE 25 MG/1
25 TABLET ORAL NIGHTLY
Qty: 60 TABLET | Refills: 0 | Status: SHIPPED | OUTPATIENT
Start: 2023-02-03

## 2023-02-03 NOTE — PROGRESS NOTES
[address], [address] , [facility], [address] Nesha Ortiz is a 65 y.o. female with  has a past medical history of ADHD, Degenerative disc disease, cervical, Diabetes mellitus ([address] H/O cardiovascular stress test, History of Holter monitoring, History of Holter monitoring, Hypertension, and PONV (postoperative nausea and vomiting). Presented to the office today for:  Chief Complaint   Patient presents with    Weight Loss       Assessment/Plan   1. Weight gain  -     topiramate (TOPAMAX) 25 MG tablet; Take 1 tablet by mouth nightly, Disp-60 tablet, R-0Normal  2. Overweight (BMI 25.0-29.9)  -     topiramate (TOPAMAX) 25 MG tablet; Take 1 tablet by mouth nightly, Disp-60 tablet, R-0Normal    Return in about 1 month (around 3/3/2023) for F/U Wt gain. .    I discussed the various treatment options with the patient today both pharmacological and nonpharmacological for her weight gain today. I discussed and encouraged lifestyle modifications including diet and exercise (150-300+/week of moderate-high intensity exercise). I discussed types of strategies for weight loss regarding her diet. I also discussed with her that she should increase the amount of exercise that she does on a regular basis including combination of weight lifting as well as aerobic exercise. Discussed with her that given the medications that she is on as well as her type 1 diabetes that we will trial Topamax. I also discussed the risk benefits and alternatives of using Topamax, including possible side-effects. She did not do well on Wellbutrin in the past/had side-effects. I also discussed with her potential for using Adipex/Qsymia, however given that she is already on Adderall we would ideally try alternatives first.  I also discussed with her the importance of stress reduction/sleep. Plan for 1-2 lbs wt loss/week. Keep a dietary log book as well. All patient questions answered.   Pt voiced understanding. Medications Discontinued During This Encounter   Medication Reason    clobetasol (TEMOVATE) 0.05 % ointment ERROR    conjugated estrogens (PREMARIN) 0.625 MG/GM vaginal cream ERROR    fluocinonide (LIDEX) 0.05 % cream ERROR       Patient received counseling on the following healthy behaviors: nutrition, exercise and medication adherence. I encouraged and discussed lifestyle modifications including diet and exercise and the patient was agreeable to making positive/beneficial changes to both to help improve their overall health. Discussed use, benefit, and side effects of prescribed medications. Barriers to medication compliance addressed. Patient given educational materials: see patient instructions. HM - HM items completed today as per orders. Outstanding HM items though not limited to immunizations were discussed with the patient today, including risks, benefits and alternatives. The patient will discuss these during the next appointment per their preference. If there are any worsening or concerning signs or symptoms, patient will report to the ED and/or contact [phone] for immediate evaluation. Teach back method was used. Subjective:    HPI  Chief Complaint   Patient presents with    Weight Loss     Pt presents to discuss weight loss today. She states she is up 25+ lbs compared to prior. She indicates that she has been trying many healthy alternatives with diet and exercise. Pt is a type 1 diabetic with her last A1C on  5/17/2022 was 7.5. She feels ideal weight is 145-150 pounds. History of eating disorders: none, although the patient does indicate that she sometimes has binge-like episodes into the evening. Previous treatments for obesity include self-directed dieting. Obesity associated medical conditions: diabetes mellitus. Recent TSH was WNL. She is walking on the treadmill about 30min/day, and plans to increase it to 45min/day. She is a Type 1 diabetic and has an insulin pump. Patient indicates that over the past year that she has been having increased amounts of stress regarding the care of her  who had an acute illness last year. Health Maintenance -   Alcohol/Substance use History - None    Tobacco Use      Smoking status: Some Days        Packs/day: 0.25        Years: 5.00        Pack years: 1.25        Types: Cigarettes        Last attempt to quit: 2000        Years since quittin.1      Smokeless tobacco: Never    Family History   Problem Relation Age of Onset    Heart Attack Brother 48    Heart Disease Mother     Cancer Father        [facility] Scores 2023   PHQ2 Score 0 0 0 0 0 0 0   PHQ9 Score 0 0 0 0 0 0 0     Interpretation of Total Score Depression Severity: 1-4 = Minimal depression, 5-9 = Mild depression, 10-14 = Moderate depression, 15-19 = Moderately severe depression, 20-27 = Severe depression    Review of Systems  Constitutional: Negative for activity change, appetite change, chills, diaphoresis, fatigue, fever and unexpected weight change. HENT: Negative for sinus pressure, sinus pain, sore throat and trouble swallowing. Respiratory: Negative for cough, shortness of breath and wheezing. Cardiovascular: Negative for chest pain, palpitations and leg swelling. Gastrointestinal: Negative for abdominal pain, diarrhea, nausea and vomiting. Endocrine: Negative for cold intolerance, polydipsia, polyphagia and polyuria. Genitourinary: Negative for difficulty urinating, flank pain and frequency. Musculoskeletal: Negative for gait problem and joint swelling. Negative for back pain, neck pain and neck stiffness. Skin: Negative for color change and wound. Negative for pallor and rash. Allergic/Immunologic: Negative for environmental allergies and food allergies. Neurological: Negative for light-headedness, numbness and headaches. Psychiatric/Behavioral: Negative for sleep disturbance. Negative for confusion and suicidal ideas. Objective:    /68   Pulse 82   Ht 5' 4.5\" (1.638 m)   Wt 166 lb (75.3 kg)   SpO2 97%   BMI 28.05 kg/m²    BP Readings from Last 3 Encounters:   02/03/23 136/68   02/01/23 100/60   06/30/22 139/66     Physical Exam  Constitutional: Patient is oriented to person, place, and time. Patient appears well-developed and well-nourished. No distress. HENT: Head: Normocephalic and atraumatic. Eyes: Pupils are equal, round, and reactive to light. Conjunctivae are normal. Right eye exhibits no discharge. Left eye exhibits no discharge. Cardiovascular: Normal rate, regular rhythm and normal heart sounds. Pulmonary/Chest: Effort normal and breath sounds normal. No respiratory distress. Patient has no wheezes. Abdominal: Soft. Bowel sounds are normal. Patient exhibits no distension. There is no tenderness. Musculoskeletal:  Patient exhibits no edema and tenderness. Patient exhibits no deformity. Neurological: Patient is alert and oriented to person, place, and time. Skin: Skin is warm and dry. Patient is not diaphoretic. Psychiatric: Patient's speech is normal and behavior is normal. Thought content normal.   Vitals reviewed.       Lab Results   Component Value Date    WBC 7.3 05/17/2022    HGB 14.1 05/17/2022    HCT 43.3 05/17/2022     05/17/2022    CHOL 162 12/02/2022    TRIG 34 12/02/2022     12/02/2022    ALT 30 12/02/2022    AST 29 12/02/2022     12/02/2022    K 5.2 12/02/2022     12/02/2022    CREATININE 0.87 12/02/2022    BUN 21 12/02/2022    CO2 27 12/02/2022    TSH 1.23 12/02/2022    LABA1C 7.5 (H) 05/17/2022    LABMICR Can not be calculated 12/03/2022     Lab Results   Component Value Date    CALCIUM 9.7 12/02/2022    PHOS 2.5 (L) 09/24/2018     Lab Results   Component Value Date    LDLCALC 61.8 10/30/2019    LDLCHOLESTEROL 45 12/02/2022       Please note that this chart was generated using voice recognition Dragon dictation software. Although every effort was made to ensure the accuracy of this automated transcription, some errors in transcription may have occurred.     Electronically signed by Dr. Joseluis Richard MD on 2/3/2023 at 12:39 PM

## 2023-02-15 ENCOUNTER — HOSPITAL ENCOUNTER (OUTPATIENT)
Dept: SLEEP CENTER | Age: 63
Discharge: HOME | End: 2023-02-15
Payer: COMMERCIAL

## 2023-02-15 DIAGNOSIS — G47.19 DAYTIME HYPERSOMNOLENCE: ICD-10-CM

## 2023-02-15 PROCEDURE — 95806 SLEEP STUDY UNATT&RESP EFFT: CPT

## 2023-02-15 ASSESSMENT — SLEEP AND FATIGUE QUESTIONNAIRES
HOW LIKELY ARE YOU TO NOD OFF OR FALL ASLEEP WHEN YOU ARE A PASSENGER IN A CAR FOR AN HOUR WITHOUT A BREAK: 0
ESS TOTAL SCORE: 0
HOW LIKELY ARE YOU TO NOD OFF OR FALL ASLEEP WHILE LYING DOWN TO REST IN THE AFTERNOON WHEN CIRCUMSTANCES PERMIT: 0
HOW LIKELY ARE YOU TO NOD OFF OR FALL ASLEEP WHILE SITTING AND TALKING TO SOMEONE: 0
HOW LIKELY ARE YOU TO NOD OFF OR FALL ASLEEP IN A CAR, WHILE STOPPED FOR A FEW MINUTES IN TRAFFIC: 0
HOW LIKELY ARE YOU TO NOD OFF OR FALL ASLEEP WHILE SITTING QUIETLY AFTER LUNCH WITHOUT ALCOHOL: 0
HOW LIKELY ARE YOU TO NOD OFF OR FALL ASLEEP WHILE WATCHING TV: 0
HOW LIKELY ARE YOU TO NOD OFF OR FALL ASLEEP WHILE SITTING INACTIVE IN A PUBLIC PLACE: 0
HOW LIKELY ARE YOU TO NOD OFF OR FALL ASLEEP WHILE SITTING AND READING: 0

## 2023-03-01 DIAGNOSIS — F98.8 ATTENTION DEFICIT DISORDER, UNSPECIFIED HYPERACTIVITY PRESENCE: ICD-10-CM

## 2023-03-02 RX ORDER — MELOXICAM 15 MG/1
15 TABLET ORAL DAILY PRN
Qty: 15 TABLET | Refills: 0 | Status: SHIPPED | OUTPATIENT
Start: 2023-03-02

## 2023-03-02 RX ORDER — DEXTROAMPHETAMINE SACCHARATE, AMPHETAMINE ASPARTATE MONOHYDRATE, DEXTROAMPHETAMINE SULFATE AND AMPHETAMINE SULFATE 6.25; 6.25; 6.25; 6.25 MG/1; MG/1; MG/1; MG/1
25 CAPSULE, EXTENDED RELEASE ORAL 2 TIMES DAILY
Qty: 60 CAPSULE | Refills: 0 | Status: SHIPPED | OUTPATIENT
Start: 2023-03-02 | End: 2023-04-01

## 2023-03-02 RX ORDER — EPINEPHRINE 0.3 MG/.3ML
INJECTION SUBCUTANEOUS
Qty: 1 EACH | Refills: 0 | Status: SHIPPED | OUTPATIENT
Start: 2023-03-02

## 2023-03-30 PROBLEM — E10.3593: Status: ACTIVE | Noted: 2018-05-08

## 2023-04-06 DIAGNOSIS — F98.8 ATTENTION DEFICIT DISORDER, UNSPECIFIED HYPERACTIVITY PRESENCE: ICD-10-CM

## 2023-04-06 DIAGNOSIS — E66.3 OVERWEIGHT (BMI 25.0-29.9): ICD-10-CM

## 2023-04-06 DIAGNOSIS — R63.5 WEIGHT GAIN: ICD-10-CM

## 2023-04-06 RX ORDER — TOPIRAMATE 25 MG/1
25 TABLET ORAL NIGHTLY
Qty: 60 TABLET | Refills: 0 | Status: SHIPPED | OUTPATIENT
Start: 2023-04-06 | End: 2023-04-21 | Stop reason: SDUPTHER

## 2023-04-06 RX ORDER — DEXTROAMPHETAMINE SACCHARATE, AMPHETAMINE ASPARTATE MONOHYDRATE, DEXTROAMPHETAMINE SULFATE AND AMPHETAMINE SULFATE 6.25; 6.25; 6.25; 6.25 MG/1; MG/1; MG/1; MG/1
25 CAPSULE, EXTENDED RELEASE ORAL 2 TIMES DAILY
Qty: 60 CAPSULE | Refills: 0 | Status: SHIPPED | OUTPATIENT
Start: 2023-04-06 | End: 2023-05-08 | Stop reason: SDUPTHER

## 2023-04-06 RX ORDER — MELOXICAM 15 MG/1
15 TABLET ORAL DAILY PRN
Qty: 30 TABLET | Refills: 0 | Status: SHIPPED | OUTPATIENT
Start: 2023-04-06

## 2023-04-21 ENCOUNTER — OFFICE VISIT (OUTPATIENT)
Dept: PRIMARY CARE CLINIC | Age: 63
End: 2023-04-21

## 2023-04-21 VITALS
WEIGHT: 165 LBS | HEART RATE: 65 BPM | SYSTOLIC BLOOD PRESSURE: 126 MMHG | RESPIRATION RATE: 16 BRPM | OXYGEN SATURATION: 96 % | DIASTOLIC BLOOD PRESSURE: 74 MMHG | BODY MASS INDEX: 27.88 KG/M2

## 2023-04-21 DIAGNOSIS — R63.5 WEIGHT GAIN: Primary | ICD-10-CM

## 2023-04-21 DIAGNOSIS — E66.3 OVERWEIGHT (BMI 25.0-29.9): ICD-10-CM

## 2023-04-21 RX ORDER — TOPIRAMATE 50 MG/1
50 TABLET, FILM COATED ORAL NIGHTLY
Qty: 30 TABLET | Refills: 1 | Status: SHIPPED | OUTPATIENT
Start: 2023-04-21

## 2023-04-21 RX ORDER — PRAMLINTIDE ACETATE 1000 UG/ML
60 INJECTION SUBCUTANEOUS
COMMUNITY
Start: 2023-04-06

## 2023-04-21 NOTE — PROGRESS NOTES
CREATININE 0.87 12/02/2022    BUN 21 12/02/2022    CO2 27 12/02/2022    TSH 1.23 12/02/2022    LABA1C 7.5 (H) 05/17/2022    LABMICR Can not be calculated 12/03/2022     Lab Results   Component Value Date    CALCIUM 9.7 12/02/2022    PHOS 2.5 (L) 09/24/2018     Lab Results   Component Value Date    LDLCALC 61.8 10/30/2019    LDLCHOLESTEROL 45 12/02/2022       Please note that this chart was generated using voice recognition Dragon dictation software. Although every effort was made to ensure the accuracy of this automated transcription, some errors in transcription may have occurred.     Electronically signed by Dr. Ruel Patiño MD on 4/21/2023 at 9:25 AM

## 2023-04-21 NOTE — PATIENT INSTRUCTIONS
SURVEY:    You may be receiving a survey from [employer] regarding your visit today. Please complete the survey to enable us to provide the highest quality of care to you and your family. If you cannot score us a very good on any question, please call the office to discuss how we could have made your experience a very good one. Thank you.

## 2023-04-26 RX ORDER — DILTIAZEM HYDROCHLORIDE 360 MG/1
CAPSULE, EXTENDED RELEASE ORAL
Qty: 90 CAPSULE | Refills: 3 | Status: SHIPPED | OUTPATIENT
Start: 2023-04-26

## 2023-05-08 ENCOUNTER — HOSPITAL ENCOUNTER (OUTPATIENT)
Age: 63
Discharge: HOME | End: 2023-05-08
Payer: COMMERCIAL

## 2023-05-08 ENCOUNTER — OFFICE VISIT (OUTPATIENT)
Dept: PRIMARY CARE CLINIC | Age: 63
End: 2023-05-08
Payer: COMMERCIAL

## 2023-05-08 VITALS
OXYGEN SATURATION: 99 % | TEMPERATURE: 97.8 F | BODY MASS INDEX: 27.49 KG/M2 | HEART RATE: 66 BPM | WEIGHT: 165 LBS | HEIGHT: 65 IN | RESPIRATION RATE: 18 BRPM | SYSTOLIC BLOOD PRESSURE: 118 MMHG | DIASTOLIC BLOOD PRESSURE: 80 MMHG

## 2023-05-08 DIAGNOSIS — E10.69 TYPE 1 DIABETES MELLITUS WITH OTHER SPECIFIED COMPLICATION (HCC): ICD-10-CM

## 2023-05-08 DIAGNOSIS — Z00.00 WELLNESS EXAMINATION: ICD-10-CM

## 2023-05-08 DIAGNOSIS — Z13.220 LIPID SCREENING: ICD-10-CM

## 2023-05-08 DIAGNOSIS — I10 PRIMARY HYPERTENSION: ICD-10-CM

## 2023-05-08 DIAGNOSIS — F98.8 ATTENTION DEFICIT DISORDER, UNSPECIFIED HYPERACTIVITY PRESENCE: ICD-10-CM

## 2023-05-08 LAB
ALBUMIN SERPL-MCNC: 4.7 G/DL (ref 3.5–5.2)
ALBUMIN/GLOBULIN RATIO: 2.1 (ref 1–2.5)
ALP SERPL-CCNC: 97 U/L (ref 35–104)
ALT SERPL-CCNC: 16 U/L (ref 5–33)
ANION GAP SERPL CALCULATED.3IONS-SCNC: 10 MMOL/L (ref 9–17)
AST SERPL-CCNC: 17 U/L
BILIRUB SERPL-MCNC: 0.4 MG/DL (ref 0.3–1.2)
BUN SERPL-MCNC: 22 MG/DL (ref 8–23)
BUN/CREAT BLD: 24 (ref 9–20)
CALCIUM SERPL-MCNC: 9.9 MG/DL (ref 8.6–10.4)
CHLORIDE SERPL-SCNC: 109 MMOL/L (ref 98–107)
CHOLEST SERPL-MCNC: 167 MG/DL
CHOLESTEROL/HDL RATIO: 1.6
CO2 SERPL-SCNC: 24 MMOL/L (ref 20–31)
CREAT SERPL-MCNC: 0.92 MG/DL (ref 0.5–0.9)
EST. AVERAGE GLUCOSE BLD GHB EST-MCNC: 148 MG/DL
GFR SERPL CREATININE-BSD FRML MDRD: >60 ML/MIN/1.73M2
GLUCOSE SERPL-MCNC: 148 MG/DL (ref 70–99)
HBA1C MFR BLD: 6.8 % (ref 4–6)
HCT VFR BLD AUTO: 42.5 % (ref 36.3–47.1)
HDLC SERPL-MCNC: 106 MG/DL
HGB BLD-MCNC: 14.4 G/DL (ref 11.9–15.1)
LDLC SERPL CALC-MCNC: 55 MG/DL (ref 0–130)
MCH RBC QN AUTO: 33 PG (ref 25.2–33.5)
MCHC RBC AUTO-ENTMCNC: 33.9 G/DL (ref 28.4–34.8)
MCV RBC AUTO: 97.5 FL (ref 82.6–102.9)
NRBC AUTOMATED: 0 PER 100 WBC
PDW BLD-RTO: 12.5 % (ref 11.8–14.4)
PLATELET # BLD AUTO: 282 K/UL (ref 138–453)
PMV BLD AUTO: 8.9 FL (ref 8.1–13.5)
POTASSIUM SERPL-SCNC: 5.1 MMOL/L (ref 3.7–5.3)
PROT SERPL-MCNC: 6.9 G/DL (ref 6.4–8.3)
RBC # BLD: 4.36 M/UL (ref 3.95–5.11)
SODIUM SERPL-SCNC: 143 MMOL/L (ref 135–144)
TRIGL SERPL-MCNC: 32 MG/DL
WBC # BLD AUTO: 5.7 K/UL (ref 3.5–11.3)

## 2023-05-08 PROCEDURE — 85027 COMPLETE CBC AUTOMATED: CPT

## 2023-05-08 PROCEDURE — 80061 LIPID PANEL: CPT

## 2023-05-08 PROCEDURE — 3074F SYST BP LT 130 MM HG: CPT | Performed by: NURSE PRACTITIONER

## 2023-05-08 PROCEDURE — 99213 OFFICE O/P EST LOW 20 MIN: CPT | Performed by: NURSE PRACTITIONER

## 2023-05-08 PROCEDURE — 36415 COLL VENOUS BLD VENIPUNCTURE: CPT

## 2023-05-08 PROCEDURE — 80053 COMPREHEN METABOLIC PANEL: CPT

## 2023-05-08 PROCEDURE — 3078F DIAST BP <80 MM HG: CPT | Performed by: NURSE PRACTITIONER

## 2023-05-08 PROCEDURE — 83036 HEMOGLOBIN GLYCOSYLATED A1C: CPT

## 2023-05-08 RX ORDER — DEXTROAMPHETAMINE SACCHARATE, AMPHETAMINE ASPARTATE MONOHYDRATE, DEXTROAMPHETAMINE SULFATE AND AMPHETAMINE SULFATE 6.25; 6.25; 6.25; 6.25 MG/1; MG/1; MG/1; MG/1
25 CAPSULE, EXTENDED RELEASE ORAL 2 TIMES DAILY
Qty: 60 CAPSULE | Refills: 0 | Status: SHIPPED | OUTPATIENT
Start: 2023-05-08 | End: 2023-06-07

## 2023-05-08 NOTE — PATIENT INSTRUCTIONS
SURVEY:    You may be receiving a survey from [employer] regarding your visit today. Please complete the survey to enable us to provide the highest quality of care to you and your family. If you cannot score us a very good on any question, please call the office to discuss how we could of made your experience a very good one. Thank you.

## 2023-05-08 NOTE — PROGRESS NOTES
1 TABLET BY MOUTH ONCE A DAY 6/13/22  Yes WILMER Pearce CNP   insulin aspart (NOVOLOG) 100 UNIT/ML injection vial INJECT VIA PUMP INSTRUCTION 3/28/22  Yes WILMER Pearce CNP   Continuous Blood Gluc Transmit (DEXCOM G6 TRANSMITTER) MISC 1 each by Other route daily 4/28/21  Yes Historical Provider, MD   Bacillus Coagulans-Inulin (ALIGN PREBIOTIC-PROBIOTIC PO) Take 1 tablet by mouth daily   Yes Historical Provider, MD   Psyllium (METAMUCIL PO) Take by mouth daily   Yes Historical Provider, MD        Allergies:       Bee venom and Sulfa antibiotics    Social History:     Tobacco:    reports that she has been smoking cigarettes. She has a 1.25 pack-year smoking history. She has never used smokeless tobacco.  Alcohol:      reports current alcohol use. Drug Use:  reports no history of drug use. Family History:     Family History   Problem Relation Age of Onset    Heart Attack Brother 48    Heart Disease Mother     Cancer Father        Review of Systems:     Positive and Negative as described in HPI    Review of Systems   Cardiovascular:  Negative for palpitations. Neurological:  Negative for dizziness and light-headedness. Psychiatric/Behavioral:  Negative for sleep disturbance. Physical Exam:   Vitals:  /80   Pulse 66   Temp 97.8 °F (36.6 °C)   Resp 18   Ht 5' 4.5\" (1.638 m)   Wt 165 lb (74.8 kg)   SpO2 99%   BMI 27.88 kg/m²     Physical Exam  Constitutional:       General: She is not in acute distress. Appearance: Normal appearance. She is normal weight. She is not ill-appearing or toxic-appearing. Cardiovascular:      Rate and Rhythm: Normal rate and regular rhythm. Heart sounds: Normal heart sounds. No murmur heard. Pulmonary:      Effort: Pulmonary effort is normal. No respiratory distress. Breath sounds: Normal breath sounds. No stridor. No wheezing, rhonchi or rales. Neurological:      Mental Status: She is alert.    Psychiatric:         Mood and Affect:

## 2023-05-09 DIAGNOSIS — R79.89 ELEVATED SERUM CREATININE: Primary | ICD-10-CM

## 2023-05-23 ENCOUNTER — OFFICE VISIT (OUTPATIENT)
Dept: PRIMARY CARE CLINIC | Age: 63
End: 2023-05-23
Payer: COMMERCIAL

## 2023-05-23 VITALS
DIASTOLIC BLOOD PRESSURE: 62 MMHG | SYSTOLIC BLOOD PRESSURE: 116 MMHG | HEIGHT: 65 IN | OXYGEN SATURATION: 98 % | HEART RATE: 62 BPM | WEIGHT: 161 LBS | BODY MASS INDEX: 26.82 KG/M2

## 2023-05-23 DIAGNOSIS — E10.3593 TYPE 1 DIABETES MELLITUS WITH PROLIFERATIVE DIABETIC RETINOPATHY WITHOUT MACULAR EDEMA, BILATERAL (HCC): ICD-10-CM

## 2023-05-23 DIAGNOSIS — E66.3 OVERWEIGHT (BMI 25.0-29.9): ICD-10-CM

## 2023-05-23 DIAGNOSIS — R63.5 WEIGHT GAIN: Primary | ICD-10-CM

## 2023-05-23 DIAGNOSIS — L90.0 LICHEN SCLEROSUS: ICD-10-CM

## 2023-05-23 PROBLEM — E44.1 MILD MALNUTRITION (HCC): Status: RESOLVED | Noted: 2017-10-23 | Resolved: 2023-05-23

## 2023-05-23 PROCEDURE — 3044F HG A1C LEVEL LT 7.0%: CPT | Performed by: STUDENT IN AN ORGANIZED HEALTH CARE EDUCATION/TRAINING PROGRAM

## 2023-05-23 PROCEDURE — 99214 OFFICE O/P EST MOD 30 MIN: CPT | Performed by: STUDENT IN AN ORGANIZED HEALTH CARE EDUCATION/TRAINING PROGRAM

## 2023-05-23 PROCEDURE — 3078F DIAST BP <80 MM HG: CPT | Performed by: STUDENT IN AN ORGANIZED HEALTH CARE EDUCATION/TRAINING PROGRAM

## 2023-05-23 PROCEDURE — 3074F SYST BP LT 130 MM HG: CPT | Performed by: STUDENT IN AN ORGANIZED HEALTH CARE EDUCATION/TRAINING PROGRAM

## 2023-05-23 RX ORDER — CLOBETASOL PROPIONATE 0.05% / NIACINAMIDE 4% 4; .05 G/100G; G/100G
1 OINTMENT TOPICAL DAILY
Qty: 60 G | Refills: 4 | Status: SHIPPED | OUTPATIENT
Start: 2023-05-23

## 2023-05-23 RX ORDER — CLOBETASOL PROPIONATE 0.05% / NIACINAMIDE 4% 4; .05 G/100G; G/100G
1 OINTMENT TOPICAL DAILY
Qty: 60 G | Refills: 0 | Status: SHIPPED | OUTPATIENT
Start: 2023-05-23 | End: 2023-05-23

## 2023-05-23 NOTE — PROGRESS NOTES
Packs/day: 0.25        Years: 5.00        Pack years: 1.25        Types: Cigarettes        Last attempt to quit: 2000        Years since quittin.4      Smokeless tobacco: Never    Family History   Problem Relation Age of Onset    Heart Attack Brother 48    Heart Disease Mother     Cancer Father        [facility] Scores 2023   PHQ2 Score 0 0 0 0 0 0 0   PHQ9 Score 0 0 0 0 0 0 0     Interpretation of Total Score Depression Severity: 1-4 = Minimal depression, 5-9 = Mild depression, 10-14 = Moderate depression, 15-19 = Moderately severe depression, 20-27 = Severe depression    Review of Systems  Constitutional: Negative for activity change, appetite change, chills, diaphoresis, fatigue, fever and unexpected weight change. HENT: Negative for sinus pressure, sinus pain, sore throat and trouble swallowing. Respiratory: Negative for cough, shortness of breath and wheezing. Cardiovascular: Negative for chest pain, palpitations and leg swelling. Gastrointestinal: Negative for abdominal pain, diarrhea, nausea and vomiting. Endocrine: Negative for cold intolerance, polydipsia, polyphagia and polyuria. Genitourinary: Negative for difficulty urinating, flank pain and frequency. Musculoskeletal: Negative for gait problem and joint swelling. Negative for back pain, neck pain and neck stiffness. Skin: Negative for color change and wound. Negative for pallor and history of rash. Allergic/Immunologic: Negative for environmental allergies and food allergies. Neurological: Negative for light-headedness, numbness and headaches. Psychiatric/Behavioral: Negative for sleep disturbance. Negative for confusion and suicidal ideas.      Objective:    /62   Pulse 62   Ht 5' 4.5\" (1.638 m)   Wt 161 lb (73 kg)   SpO2 98%   BMI 27.21 kg/m²    BP Readings from Last 3 Encounters:   23 116/62   23 118/80   23 126/74     Physical

## 2023-05-24 DIAGNOSIS — N90.4 LICHEN SCLEROSUS OF FEMALE GENITALIA: ICD-10-CM

## 2023-05-24 RX ORDER — CLOBETASOL PROPIONATE 0.5 MG/G
OINTMENT TOPICAL
Qty: 15 G | Refills: 5 | Status: SHIPPED | OUTPATIENT
Start: 2023-05-24

## 2023-06-22 DIAGNOSIS — R63.5 WEIGHT GAIN: ICD-10-CM

## 2023-06-22 DIAGNOSIS — E66.3 OVERWEIGHT (BMI 25.0-29.9): ICD-10-CM

## 2023-06-22 RX ORDER — TOPIRAMATE 50 MG/1
50 TABLET, FILM COATED ORAL NIGHTLY
Qty: 30 TABLET | Refills: 1 | Status: SHIPPED | OUTPATIENT
Start: 2023-06-22

## 2023-06-26 RX ORDER — ENALAPRIL MALEATE 2.5 MG/1
TABLET ORAL
Qty: 90 TABLET | Refills: 3 | Status: SHIPPED | OUTPATIENT
Start: 2023-06-26

## 2023-06-26 RX ORDER — ATORVASTATIN CALCIUM 10 MG/1
TABLET, FILM COATED ORAL
Qty: 90 TABLET | Refills: 3 | Status: SHIPPED | OUTPATIENT
Start: 2023-06-26

## 2023-06-27 ENCOUNTER — APPOINTMENT (OUTPATIENT)
Dept: GENERAL RADIOLOGY | Age: 63
End: 2023-06-27
Payer: COMMERCIAL

## 2023-06-27 ENCOUNTER — HOSPITAL ENCOUNTER (EMERGENCY)
Age: 63
Discharge: HOME | End: 2023-06-27
Attending: EMERGENCY MEDICINE
Payer: COMMERCIAL

## 2023-06-27 VITALS
WEIGHT: 161 LBS | RESPIRATION RATE: 20 BRPM | SYSTOLIC BLOOD PRESSURE: 109 MMHG | HEART RATE: 50 BPM | TEMPERATURE: 97.5 F | OXYGEN SATURATION: 98 % | DIASTOLIC BLOOD PRESSURE: 36 MMHG | BODY MASS INDEX: 27.21 KG/M2
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
## 2023-06-27 PROCEDURE — 99283 EMERGENCY DEPT VISIT LOW MDM: CPT

## 2023-06-27 PROCEDURE — 2500000003 HC RX 250 WO HCPCS: Performed by: EMERGENCY MEDICINE

## 2023-06-27 PROCEDURE — 73130 X-RAY EXAM OF HAND: CPT

## 2023-06-27 PROCEDURE — 12001 RPR S/N/AX/GEN/TRNK 2.5CM/<: CPT

## 2023-06-27 PROCEDURE — 6370000000 HC RX 637 (ALT 250 FOR IP): Performed by: EMERGENCY MEDICINE

## 2023-06-27 RX ORDER — AMOXICILLIN AND CLAVULANATE POTASSIUM 875; 125 MG/1; MG/1
1 TABLET, FILM COATED ORAL ONCE
Status: COMPLETED | OUTPATIENT
Start: 2023-06-27 | End: 2023-06-27

## 2023-06-27 RX ORDER — BACITRACIN ZINC 500 [USP'U]/G
OINTMENT TOPICAL ONCE
Status: COMPLETED | OUTPATIENT
Start: 2023-06-27 | End: 2023-06-27

## 2023-06-27 RX ORDER — LIDOCAINE HYDROCHLORIDE 10 MG/ML
20 INJECTION, SOLUTION INFILTRATION; PERINEURAL ONCE
Status: COMPLETED | OUTPATIENT
Start: 2023-06-27 | End: 2023-06-27

## 2023-06-27 RX ORDER — AMOXICILLIN AND CLAVULANATE POTASSIUM 875; 125 MG/1; MG/1
1 TABLET, FILM COATED ORAL 2 TIMES DAILY
Qty: 14 TABLET | Refills: 0 | Status: SHIPPED | OUTPATIENT
Start: 2023-06-27 | End: 2023-07-04

## 2023-06-27 RX ORDER — ACETAMINOPHEN 500 MG
1000 TABLET ORAL ONCE
Status: COMPLETED | OUTPATIENT
Start: 2023-06-27 | End: 2023-06-27

## 2023-06-27 RX ADMIN — BACITRACIN ZINC: 500 OINTMENT TOPICAL at 07:43

## 2023-06-27 RX ADMIN — ACETAMINOPHEN 1000 MG: 500 TABLET ORAL at 06:47

## 2023-06-27 RX ADMIN — LIDOCAINE HYDROCHLORIDE 20 ML: 10 INJECTION, SOLUTION INFILTRATION; PERINEURAL at 07:43

## 2023-06-27 RX ADMIN — AMOXICILLIN AND CLAVULANATE POTASSIUM 1 TABLET: 875; 125 TABLET, FILM COATED ORAL at 07:00

## 2023-06-27 ASSESSMENT — PAIN DESCRIPTION - DESCRIPTORS: DESCRIPTORS: THROBBING;TENDER

## 2023-06-27 ASSESSMENT — PAIN DESCRIPTION - LOCATION: LOCATION: HAND

## 2023-06-27 ASSESSMENT — PAIN - FUNCTIONAL ASSESSMENT: PAIN_FUNCTIONAL_ASSESSMENT: 0-10

## 2023-06-27 ASSESSMENT — PAIN DESCRIPTION - PAIN TYPE: TYPE: ACUTE PAIN

## 2023-06-27 ASSESSMENT — PAIN DESCRIPTION - ORIENTATION: ORIENTATION: RIGHT

## 2023-06-27 ASSESSMENT — PAIN SCALES - GENERAL: PAINLEVEL_OUTOF10: 8

## 2023-07-27 ENCOUNTER — OFFICE VISIT (OUTPATIENT)
Dept: PRIMARY CARE CLINIC | Age: 63
End: 2023-07-27
Payer: COMMERCIAL

## 2023-07-27 VITALS
DIASTOLIC BLOOD PRESSURE: 60 MMHG | HEIGHT: 65 IN | HEART RATE: 60 BPM | SYSTOLIC BLOOD PRESSURE: 138 MMHG | WEIGHT: 158.2 LBS | BODY MASS INDEX: 26.36 KG/M2

## 2023-07-27 DIAGNOSIS — E66.3 OVERWEIGHT (BMI 25.0-29.9): ICD-10-CM

## 2023-07-27 DIAGNOSIS — F98.8 ATTENTION DEFICIT DISORDER, UNSPECIFIED HYPERACTIVITY PRESENCE: ICD-10-CM

## 2023-07-27 DIAGNOSIS — R63.5 WEIGHT GAIN: Primary | ICD-10-CM

## 2023-07-27 PROCEDURE — 3078F DIAST BP <80 MM HG: CPT | Performed by: STUDENT IN AN ORGANIZED HEALTH CARE EDUCATION/TRAINING PROGRAM

## 2023-07-27 PROCEDURE — 99214 OFFICE O/P EST MOD 30 MIN: CPT | Performed by: STUDENT IN AN ORGANIZED HEALTH CARE EDUCATION/TRAINING PROGRAM

## 2023-07-27 PROCEDURE — 3075F SYST BP GE 130 - 139MM HG: CPT | Performed by: STUDENT IN AN ORGANIZED HEALTH CARE EDUCATION/TRAINING PROGRAM

## 2023-07-27 RX ORDER — TOPIRAMATE 50 MG/1
100 TABLET, FILM COATED ORAL NIGHTLY
Qty: 60 TABLET | Refills: 2 | Status: SHIPPED | OUTPATIENT
Start: 2023-07-27 | End: 2023-08-26

## 2023-07-27 RX ORDER — DEXTROAMPHETAMINE SACCHARATE, AMPHETAMINE ASPARTATE, DEXTROAMPHETAMINE SULFATE AND AMPHETAMINE SULFATE 7.5; 7.5; 7.5; 7.5 MG/1; MG/1; MG/1; MG/1
30 TABLET ORAL 2 TIMES DAILY
Qty: 60 TABLET | Refills: 0 | Status: SHIPPED | OUTPATIENT
Start: 2023-07-27 | End: 2023-08-26

## 2023-07-27 RX ORDER — HYDROXYZINE HYDROCHLORIDE 10 MG/1
TABLET, FILM COATED ORAL
COMMUNITY
Start: 2023-06-26

## 2023-07-27 NOTE — PROGRESS NOTES
[address], [place] [place], [place], [address] [facility]. Sharon Conn is a 65 y.o. female with  has a past medical history of ADHD, Degenerative disc disease, cervical, Diabetes mellitus ([facility]), H/O cardiovascular stress test, History of Holter monitoring, History of Holter monitoring, Hypertension, and PONV (postoperative nausea and vomiting). Presented to the office today for:  Chief Complaint   Patient presents with    Weight Gain       Assessment/Plan   1. Weight gain  -     topiramate (TOPAMAX) 50 MG tablet; Take 2 tablets by mouth nightly, Disp-60 tablet, R-2Normal  2. Overweight (BMI 25.0-29.9)  -     topiramate (TOPAMAX) 50 MG tablet; Take 2 tablets by mouth nightly, Disp-60 tablet, R-2Normal  3. Attention deficit disorder, unspecified hyperactivity presence  -     amphetamine-dextroamphetamine (ADDERALL, 30MG,) 30 MG tablet; Take 1 tablet by mouth 2 times daily for 30 days. MAURY if needed, depending on availability Max Daily Amount: 60 mg, Disp-60 tablet, R-0Normal    Return in about 1 month (around 8/27/2023) for F/U Wt. Continue w/ Topamax, incr. The dose today to 100mg. Continue w/ Adderrall XR 25mg BID, well tolerated at this time. Diet - very low calories at this time, about 1000 kcal/day at this time as discussed today, well tolerated. Limited carbohydrates, low glycemic index foods. Exercise - 150+ minutes/week, treadmill 45min every morning, playing golf, with plans to start weight lifting. Last PDMP Jason Fajardo as Reviewed:  Review User Review Instant Review Result   PALMA COOLEY 7/27/2023  9:00 AM Reviewed PDMP [1]         All patient questions answered. Pt voiced understanding.    Medications Discontinued During This Encounter   Medication Reason    naltrexone-buPROPion (CONTRAVE) 8-90 MG per extended release tablet Side effects    topiramate (TOPAMAX) 50 MG tablet REORDER       Patient received counseling on the following healthy behaviors: nutrition,

## 2023-07-31 DIAGNOSIS — F98.8 ATTENTION DEFICIT DISORDER, UNSPECIFIED HYPERACTIVITY PRESENCE: ICD-10-CM

## 2023-07-31 RX ORDER — DEXTROAMPHETAMINE SACCHARATE, AMPHETAMINE ASPARTATE MONOHYDRATE, DEXTROAMPHETAMINE SULFATE AND AMPHETAMINE SULFATE 6.25; 6.25; 6.25; 6.25 MG/1; MG/1; MG/1; MG/1
25 CAPSULE, EXTENDED RELEASE ORAL 2 TIMES DAILY
Qty: 60 CAPSULE | Refills: 0 | OUTPATIENT
Start: 2023-07-31 | End: 2023-08-30

## 2023-07-31 NOTE — TELEPHONE ENCOUNTER
Patient called in stating that the wrong medication was sent into the pharmacy. The correct medication is Adderall XR Extended release twice daily. Medication pended for physician. She is down to one tablet.

## 2023-08-25 ENCOUNTER — OFFICE VISIT (OUTPATIENT)
Dept: PRIMARY CARE CLINIC | Age: 63
End: 2023-08-25

## 2023-08-25 VITALS
OXYGEN SATURATION: 99 % | DIASTOLIC BLOOD PRESSURE: 72 MMHG | BODY MASS INDEX: 26.33 KG/M2 | WEIGHT: 158 LBS | HEART RATE: 76 BPM | SYSTOLIC BLOOD PRESSURE: 148 MMHG | HEIGHT: 65 IN

## 2023-08-25 DIAGNOSIS — E66.3 OVERWEIGHT (BMI 25.0-29.9): Primary | ICD-10-CM

## 2023-08-25 DIAGNOSIS — R63.5 WEIGHT GAIN: ICD-10-CM

## 2023-08-25 NOTE — PROGRESS NOTES
[address], [place] [address] [place], [place], [address] [facility]. Reza Turner is a 65 y.o. female with  has a past medical history of ADHD, Degenerative disc disease, cervical, Diabetes mellitus ([address]), H/O cardiovascular stress test, History of Holter monitoring, History of Holter monitoring, Hypertension, and PONV (postoperative nausea and vomiting). Presented to the office today for:  Chief Complaint   Patient presents with    Weight Management       Assessment/Plan   1. Overweight (BMI 25.0-29.9)  2. Weight gain  Return in about 3 months (around 11/25/2023) for F/U Wt. Rima Fears The goal is 145-150lbs or so  Continue w/ Topamax 100mg at the current dose  Diet - continue w/ current caloric intake, 1200kcal/day to be continued  Exercise - 150+ minutes/week, including treadmill. Plan to to start 3-5x/week of weight resistance training     All patient questions answered. Pt voiced understanding. Medications Discontinued During This Encounter   Medication Reason    clobetasol (TEMOVATE) 0.05 % ointment ERROR       Patient received counseling on the following healthy behaviors: nutrition, exercise and medication adherence. I encouraged and discussed lifestyle modifications including diet and exercise and the patient was agreeable to making positive/beneficial changes to both to help improve their overall health. Discussed use, benefit, and side effects of prescribed medications. Barriers to medication compliance addressed. Patient given educational materials: see patient instructions. HM - HM items completed today as per orders. Outstanding HM items though not limited to immunizations were discussed with the patient today, including risks, benefits and alternatives. The patient will discuss these during the next appointment per their preference. If there are any worsening or concerning signs or symptoms, patient will report to the ED and/or contact [id number] for immediate evaluation.

## 2023-09-19 ENCOUNTER — OFFICE VISIT (OUTPATIENT)
Dept: PRIMARY CARE CLINIC | Age: 63
End: 2023-09-19
Payer: COMMERCIAL

## 2023-09-19 VITALS
TEMPERATURE: 98.7 F | HEART RATE: 71 BPM | HEIGHT: 65 IN | DIASTOLIC BLOOD PRESSURE: 80 MMHG | SYSTOLIC BLOOD PRESSURE: 120 MMHG | RESPIRATION RATE: 18 BRPM | BODY MASS INDEX: 25.99 KG/M2 | WEIGHT: 156 LBS | OXYGEN SATURATION: 98 %

## 2023-09-19 DIAGNOSIS — F90.2 ATTENTION DEFICIT HYPERACTIVITY DISORDER (ADHD), COMBINED TYPE: Primary | ICD-10-CM

## 2023-09-19 DIAGNOSIS — F98.8 ATTENTION DEFICIT DISORDER, UNSPECIFIED HYPERACTIVITY PRESENCE: ICD-10-CM

## 2023-09-19 PROCEDURE — 99213 OFFICE O/P EST LOW 20 MIN: CPT | Performed by: NURSE PRACTITIONER

## 2023-09-19 PROCEDURE — 3079F DIAST BP 80-89 MM HG: CPT | Performed by: NURSE PRACTITIONER

## 2023-09-19 PROCEDURE — 3074F SYST BP LT 130 MM HG: CPT | Performed by: NURSE PRACTITIONER

## 2023-09-19 RX ORDER — DEXTROAMPHETAMINE SACCHARATE, AMPHETAMINE ASPARTATE MONOHYDRATE, DEXTROAMPHETAMINE SULFATE AND AMPHETAMINE SULFATE 7.5; 7.5; 7.5; 7.5 MG/1; MG/1; MG/1; MG/1
CAPSULE, EXTENDED RELEASE ORAL
Qty: 60 CAPSULE | Refills: 0 | Status: SHIPPED | OUTPATIENT
Start: 2023-09-19 | End: 2023-10-20

## 2023-09-19 NOTE — PATIENT INSTRUCTIONS
SURVEY:    You may be receiving a survey from [employer] regarding your visit today. Please complete the survey to enable us to provide the highest quality of care to you and your family. If you cannot score us a very good on any question, please call the office to discuss how we could of made your experience a very good one. Thank you.

## 2023-09-19 NOTE — TELEPHONE ENCOUNTER
It has been >3 months since I have evaluated the patient for her controlled substance monitoring visit. She will need to schedule an appt.

## 2023-09-19 NOTE — PROGRESS NOTES
Name: Brisa East  : 1960         Chief Complaint:     Chief Complaint   Patient presents with    Medication Check     Adderall 30. Doing well on this dose. Denies any concens. History of Present Illness:      Brisa East is a 59 y.o.  female who presents with Medication Check (Adderall 30. Doing well on this dose. Denies any concens. )      HPI    ADHD:  Current treatment includes adderall XR 30mg capsules BID. She denies side effects. She confirms this dose is working well to control focus concerns. She denies chest pain, lightheadedness, or palpitations. Past Medical History:     Past Medical History:   Diagnosis Date    ADHD     Degenerative disc disease, cervical     Diabetes mellitus ([address])     Has insulin pump    H/O cardiovascular stress test 2017    Largerly normal moyocardial perfusion imaging with a soft tissue artifact, but without evidence of significant myocardial ischemia or infarction. EF 66%. The pt Flores treadmill score is 4 which correlates with a low/intermiediate risk for CAD. History of Holter monitoring 2018    Very frequent PVC's. No symptoms were reported. Very frequent premature ventricular ectopic beats total 5610 consisting of 5610 isolated PVC's. History of Holter monitoring 02/15/2018    Very frequent PVC's. No symptoms were reported. Consider additional testing and/or treatment if clinically indicated.      Hypertension     PONV (postoperative nausea and vomiting)       Reviewed all health maintenance requirements and ordered appropriate tests  Health Maintenance Due   Topic Date Due    Shingles vaccine (2 of 2) 2019    Hepatitis B vaccine (1 of 3 - Risk 3-dose series) Never done    Flu vaccine (1) 2023       Past Surgical History:     Past Surgical History:   Procedure Laterality Date    CARDIAC CATHETERIZATION Left 10/25/2018    right radial/[facility]/Dr. Demond Manrique    CARPAL TUNNEL RELEASE Right 2022    CARPAL TUNNEL RELEASE ENDOSCOPIC

## 2023-10-17 ENCOUNTER — HOSPITAL ENCOUNTER (OUTPATIENT)
Age: 63
Setting detail: SPECIMEN
Discharge: HOME | End: 2023-10-17
Payer: COMMERCIAL

## 2023-10-17 ENCOUNTER — OFFICE VISIT (OUTPATIENT)
Dept: PRIMARY CARE CLINIC | Age: 63
End: 2023-10-17
Payer: COMMERCIAL

## 2023-10-17 ENCOUNTER — TELEPHONE (OUTPATIENT)
Dept: PRIMARY CARE CLINIC | Age: 63
End: 2023-10-17

## 2023-10-17 VITALS
DIASTOLIC BLOOD PRESSURE: 80 MMHG | BODY MASS INDEX: 24.83 KG/M2 | TEMPERATURE: 97.5 F | WEIGHT: 149 LBS | RESPIRATION RATE: 18 BRPM | HEIGHT: 65 IN | HEART RATE: 61 BPM | OXYGEN SATURATION: 98 % | SYSTOLIC BLOOD PRESSURE: 120 MMHG

## 2023-10-17 DIAGNOSIS — R30.0 DYSURIA: ICD-10-CM

## 2023-10-17 DIAGNOSIS — R30.0 DYSURIA: Primary | ICD-10-CM

## 2023-10-17 LAB
BILIRUBIN, POC: 0
BLOOD URINE, POC: ABNORMAL
CLARITY, POC: ABNORMAL
COLOR, POC: ABNORMAL
GLUCOSE URINE, POC: ABNORMAL
KETONES, POC: ABNORMAL
LEUKOCYTE EST, POC: ABNORMAL
NITRITE, POC: ABNORMAL
PH, POC: 7
PROTEIN, POC: ABNORMAL
SPECIFIC GRAVITY, POC: 1.01
UROBILINOGEN, POC: ABNORMAL

## 2023-10-17 PROCEDURE — 99213 OFFICE O/P EST LOW 20 MIN: CPT | Performed by: NURSE PRACTITIONER

## 2023-10-17 PROCEDURE — 3078F DIAST BP <80 MM HG: CPT | Performed by: NURSE PRACTITIONER

## 2023-10-17 PROCEDURE — 87086 URINE CULTURE/COLONY COUNT: CPT

## 2023-10-17 PROCEDURE — 81003 URINALYSIS AUTO W/O SCOPE: CPT | Performed by: NURSE PRACTITIONER

## 2023-10-17 PROCEDURE — 3074F SYST BP LT 130 MM HG: CPT | Performed by: NURSE PRACTITIONER

## 2023-10-17 RX ORDER — NITROFURANTOIN 25; 75 MG/1; MG/1
100 CAPSULE ORAL 2 TIMES DAILY
Qty: 10 CAPSULE | Refills: 0 | Status: SHIPPED | OUTPATIENT
Start: 2023-10-17 | End: 2023-10-22

## 2023-10-17 NOTE — TELEPHONE ENCOUNTER
Patient will need appt. Is there availability on other provider schedule?  May need to double book my schedule

## 2023-10-17 NOTE — TELEPHONE ENCOUNTER
Patient called stating that she has a terrible UTI and that she needs Macrobid called in for her. I explained we would need a urine culture and patient stated that she can not wait a day, due to no openings today. Patient would like to have a urine culture ordered and medication sent. Please advise.

## 2023-10-17 NOTE — PATIENT INSTRUCTIONS
SURVEY:    You may be receiving a survey from [employer] regarding your visit today. Please complete the survey to enable us to provide the highest quality of care to you and your family. If you cannot score us a very good on any question, please call the office to discuss how we could of made your experience a very good one. Thank you.

## 2023-10-18 LAB
MICROORGANISM SPEC CULT: NO GROWTH
SPECIMEN DESCRIPTION: NORMAL

## 2023-10-25 DIAGNOSIS — R63.5 WEIGHT GAIN: ICD-10-CM

## 2023-10-25 DIAGNOSIS — E66.3 OVERWEIGHT (BMI 25.0-29.9): ICD-10-CM

## 2023-10-25 RX ORDER — TOPIRAMATE 50 MG/1
TABLET, FILM COATED ORAL
Qty: 60 TABLET | Refills: 0 | Status: SHIPPED | OUTPATIENT
Start: 2023-10-25

## 2023-11-07 DIAGNOSIS — F98.8 ATTENTION DEFICIT DISORDER, UNSPECIFIED HYPERACTIVITY PRESENCE: ICD-10-CM

## 2023-11-08 RX ORDER — DEXTROAMPHETAMINE SACCHARATE, AMPHETAMINE ASPARTATE MONOHYDRATE, DEXTROAMPHETAMINE SULFATE AND AMPHETAMINE SULFATE 7.5; 7.5; 7.5; 7.5 MG/1; MG/1; MG/1; MG/1
CAPSULE, EXTENDED RELEASE ORAL
Qty: 60 CAPSULE | Refills: 0 | Status: SHIPPED | OUTPATIENT
Start: 2023-11-08 | End: 2023-12-08

## 2023-11-27 ENCOUNTER — TELEPHONE (OUTPATIENT)
Dept: PRIMARY CARE CLINIC | Age: 63
End: 2023-11-27

## 2023-11-27 DIAGNOSIS — R63.5 WEIGHT GAIN: ICD-10-CM

## 2023-11-27 DIAGNOSIS — E66.3 OVERWEIGHT (BMI 25.0-29.9): ICD-10-CM

## 2023-11-27 RX ORDER — TOPIRAMATE 50 MG/1
TABLET, FILM COATED ORAL
Qty: 180 TABLET | Refills: 0 | Status: SHIPPED | OUTPATIENT
Start: 2023-11-27

## 2023-11-27 NOTE — TELEPHONE ENCOUNTER
Patient called in and states she tested positive for covid. She rescheduled appt to next week. She is requesting a refill on the pended medication.

## 2023-12-04 ENCOUNTER — OFFICE VISIT (OUTPATIENT)
Dept: PRIMARY CARE CLINIC | Age: 63
End: 2023-12-04
Payer: COMMERCIAL

## 2023-12-04 VITALS
HEART RATE: 76 BPM | BODY MASS INDEX: 25.16 KG/M2 | WEIGHT: 151 LBS | DIASTOLIC BLOOD PRESSURE: 62 MMHG | HEIGHT: 65 IN | OXYGEN SATURATION: 98 % | SYSTOLIC BLOOD PRESSURE: 118 MMHG

## 2023-12-04 DIAGNOSIS — G47.00 INSOMNIA, UNSPECIFIED TYPE: ICD-10-CM

## 2023-12-04 DIAGNOSIS — R63.5 WEIGHT GAIN: Primary | ICD-10-CM

## 2023-12-04 PROCEDURE — 3078F DIAST BP <80 MM HG: CPT | Performed by: STUDENT IN AN ORGANIZED HEALTH CARE EDUCATION/TRAINING PROGRAM

## 2023-12-04 PROCEDURE — 3074F SYST BP LT 130 MM HG: CPT | Performed by: STUDENT IN AN ORGANIZED HEALTH CARE EDUCATION/TRAINING PROGRAM

## 2023-12-04 PROCEDURE — 99214 OFFICE O/P EST MOD 30 MIN: CPT | Performed by: STUDENT IN AN ORGANIZED HEALTH CARE EDUCATION/TRAINING PROGRAM

## 2023-12-04 RX ORDER — MELOXICAM 15 MG/1
15 TABLET ORAL DAILY PRN
Qty: 90 TABLET | Refills: 0 | Status: SHIPPED | OUTPATIENT
Start: 2023-12-04

## 2023-12-04 RX ORDER — TRAZODONE HYDROCHLORIDE 100 MG/1
150 TABLET ORAL NIGHTLY PRN
Qty: 180 TABLET | Refills: 0 | Status: SHIPPED | OUTPATIENT
Start: 2023-12-04

## 2023-12-04 NOTE — PROGRESS NOTES
[address], [place] [address] [place], [place], [address] [facility]. Familia Toure is a 59 y.o. female with  has a past medical history of ADHD, Degenerative disc disease, cervical, Diabetes mellitus ([address]), H/O cardiovascular stress test, History of Holter monitoring, History of Holter monitoring, Hypertension, and PONV (postoperative nausea and vomiting). Presented to the office today for:  Chief Complaint   Patient presents with    Weight Management       Assessment/Plan   Weight Gain  Continue w/ Topamax at the current dose of 100mg  Continue w/ current caloric intake, exercise 150+ minutes per week, treadmill  Continue w/ weight/resistance training per prior, 4x/week    Insomnia - Trazodone to be continued at 100-150mg hs as needed. All patient questions answered. Pt voiced understanding. There are no discontinued medications. Patient received counseling on the following healthy behaviors: nutrition, exercise and medication adherence. I encouraged and discussed lifestyle modifications including diet and exercise and the patient was agreeable to making positive/beneficial changes to both to help improve their overall health. Discussed use, benefit, and side effects of prescribed medications. Barriers to medication compliance addressed. Patient given educational materials: see patient instructions. HM - HM items completed today as per orders. Outstanding HM items though not limited to immunizations were discussed with the patient today, including risks, benefits and alternatives. The patient will discuss these during the next appointment per their preference. If there are any worsening or concerning signs or symptoms, patient will report to the ED and/or contact [phone] for immediate evaluation. Teach back method was used.      Subjective:    HPI  Chief Complaint   Patient presents with    Weight Management     Weight management Dickson Ivory is a 65 y.o. female here

## 2024-02-26 DIAGNOSIS — E66.3 OVERWEIGHT (BMI 25.0-29.9): ICD-10-CM

## 2024-02-26 DIAGNOSIS — R63.5 WEIGHT GAIN: ICD-10-CM

## 2024-02-26 RX ORDER — TOPIRAMATE 50 MG/1
TABLET, FILM COATED ORAL
Qty: 180 TABLET | Refills: 0 | Status: SHIPPED | OUTPATIENT
Start: 2024-02-26

## 2024-02-26 NOTE — TELEPHONE ENCOUNTER
Hello, patient is requesting for refill Topiramate 50 MG Tablet sent to [facility] pharmacy in [place] due to on vacation. Patient stated she has 3 tablets left and has 10 days left on vacation. Thank you

## 2024-04-26 RX ORDER — DILTIAZEM HYDROCHLORIDE 360 MG/1
360 CAPSULE, EXTENDED RELEASE ORAL DAILY
Qty: 90 CAPSULE | Refills: 3 | Status: SHIPPED | OUTPATIENT
Start: 2024-04-26

## 2024-04-26 RX ORDER — DILTIAZEM HYDROCHLORIDE 240 MG/1
240 CAPSULE, COATED, EXTENDED RELEASE ORAL DAILY
Qty: 150 CAPSULE | Refills: 3 | OUTPATIENT
Start: 2024-04-26

## 2024-04-26 NOTE — TELEPHONE ENCOUNTER
Patient confirms she is taking diltiazem 360 and needs a refill on this. She does not take the 240 mg.

## 2024-04-26 NOTE — TELEPHONE ENCOUNTER
Pended rx is for cardizem 240mg QD. Medication listed on med list is cardizem 360mg QD. Please clarify

## 2024-05-21 RX ORDER — ATORVASTATIN CALCIUM 10 MG/1
TABLET, FILM COATED ORAL
Qty: 90 TABLET | Refills: 3 | Status: SHIPPED | OUTPATIENT
Start: 2024-05-21

## 2024-06-10 ENCOUNTER — HOSPITAL ENCOUNTER (OUTPATIENT)
Dept: GENERAL RADIOLOGY | Age: 64
Discharge: HOME | End: 2024-06-12
Payer: COMMERCIAL

## 2024-06-10 ENCOUNTER — HOSPITAL ENCOUNTER (OUTPATIENT)
Age: 64
Discharge: HOME | End: 2024-06-12
Payer: COMMERCIAL

## 2024-06-10 ENCOUNTER — HOSPITAL ENCOUNTER (OUTPATIENT)
Age: 64
Discharge: HOME | End: 2024-06-10
Payer: COMMERCIAL

## 2024-06-10 ENCOUNTER — TELEPHONE (OUTPATIENT)
Dept: PRIMARY CARE CLINIC | Age: 64
End: 2024-06-10

## 2024-06-10 ENCOUNTER — OFFICE VISIT (OUTPATIENT)
Dept: PRIMARY CARE CLINIC | Age: 64
End: 2024-06-10
Payer: COMMERCIAL

## 2024-06-10 VITALS
OXYGEN SATURATION: 97 % | SYSTOLIC BLOOD PRESSURE: 150 MMHG | TEMPERATURE: 95.3 F | WEIGHT: 168 LBS | DIASTOLIC BLOOD PRESSURE: 70 MMHG | BODY MASS INDEX: 27.96 KG/M2 | HEART RATE: 71 BPM

## 2024-06-10 DIAGNOSIS — R05.9 COUGH, UNSPECIFIED TYPE: ICD-10-CM

## 2024-06-10 DIAGNOSIS — M79.674 CHRONIC TOE PAIN, RIGHT FOOT: ICD-10-CM

## 2024-06-10 DIAGNOSIS — R07.9 CHEST PAIN, UNSPECIFIED TYPE: ICD-10-CM

## 2024-06-10 DIAGNOSIS — Z00.00 WELLNESS EXAMINATION: ICD-10-CM

## 2024-06-10 DIAGNOSIS — M25.572 CHRONIC PAIN OF LEFT ANKLE: ICD-10-CM

## 2024-06-10 DIAGNOSIS — Z12.31 BREAST CANCER SCREENING BY MAMMOGRAM: ICD-10-CM

## 2024-06-10 DIAGNOSIS — M25.552 BILATERAL HIP PAIN: ICD-10-CM

## 2024-06-10 DIAGNOSIS — M25.551 BILATERAL HIP PAIN: ICD-10-CM

## 2024-06-10 DIAGNOSIS — G47.00 INSOMNIA, UNSPECIFIED TYPE: ICD-10-CM

## 2024-06-10 DIAGNOSIS — G89.29 CHRONIC PAIN OF LEFT ANKLE: ICD-10-CM

## 2024-06-10 DIAGNOSIS — I10 PRIMARY HYPERTENSION: Primary | ICD-10-CM

## 2024-06-10 DIAGNOSIS — N90.89 VULVAR LESION: ICD-10-CM

## 2024-06-10 DIAGNOSIS — Z78.0 POST-MENOPAUSAL: ICD-10-CM

## 2024-06-10 DIAGNOSIS — G89.29 CHRONIC TOE PAIN, RIGHT FOOT: ICD-10-CM

## 2024-06-10 DIAGNOSIS — Z00.00 WELLNESS EXAMINATION: Primary | ICD-10-CM

## 2024-06-10 PROBLEM — B96.29 UTI DUE TO EXTENDED-SPECTRUM BETA LACTAMASE (ESBL) PRODUCING ESCHERICHIA COLI: Status: RESOLVED | Noted: 2018-09-21 | Resolved: 2024-06-10

## 2024-06-10 PROBLEM — R78.81 BACTEREMIA DUE TO ESCHERICHIA COLI: Status: RESOLVED | Noted: 2018-09-25 | Resolved: 2024-06-10

## 2024-06-10 PROBLEM — A41.9 SEPSIS (HCC): Status: RESOLVED | Noted: 2018-09-19 | Resolved: 2024-06-10

## 2024-06-10 PROBLEM — B96.20 BACTEREMIA DUE TO ESCHERICHIA COLI: Status: RESOLVED | Noted: 2018-09-25 | Resolved: 2024-06-10

## 2024-06-10 PROBLEM — Z16.12 UTI DUE TO EXTENDED-SPECTRUM BETA LACTAMASE (ESBL) PRODUCING ESCHERICHIA COLI: Status: RESOLVED | Noted: 2018-09-21 | Resolved: 2024-06-10

## 2024-06-10 PROBLEM — N39.0 UTI DUE TO EXTENDED-SPECTRUM BETA LACTAMASE (ESBL) PRODUCING ESCHERICHIA COLI: Status: RESOLVED | Noted: 2018-09-21 | Resolved: 2024-06-10

## 2024-06-10 PROBLEM — E10.10 DIABETIC KETOACIDOSIS WITHOUT COMA ASSOCIATED WITH TYPE 1 DIABETES MELLITUS (HCC): Status: RESOLVED | Noted: 2017-10-22 | Resolved: 2024-06-10

## 2024-06-10 LAB
ALBUMIN SERPL-MCNC: 4.3 G/DL (ref 3.5–5.2)
ALBUMIN/GLOB SERPL: 1.7 {RATIO} (ref 1–2.5)
ALP SERPL-CCNC: 88 U/L (ref 35–104)
ALT SERPL-CCNC: 33 U/L (ref 5–33)
ANION GAP SERPL CALCULATED.3IONS-SCNC: 13 MMOL/L (ref 9–17)
AST SERPL-CCNC: 29 U/L
BILIRUB SERPL-MCNC: 0.4 MG/DL (ref 0.3–1.2)
BUN SERPL-MCNC: 19 MG/DL (ref 8–23)
BUN/CREAT SERPL: 24 (ref 9–20)
CALCIUM SERPL-MCNC: 9.3 MG/DL (ref 8.6–10.4)
CHLORIDE SERPL-SCNC: 102 MMOL/L (ref 98–107)
CO2 SERPL-SCNC: 22 MMOL/L (ref 20–31)
CREAT SERPL-MCNC: 0.8 MG/DL (ref 0.5–0.9)
CRP SERPL HS-MCNC: <3 MG/L (ref 0–5)
ERYTHROCYTE [DISTWIDTH] IN BLOOD BY AUTOMATED COUNT: 13.1 % (ref 11.8–14.4)
ERYTHROCYTE [SEDIMENTATION RATE] IN BLOOD BY PHOTOMETRIC METHOD: 1 MM/HR (ref 0–30)
GFR, ESTIMATED: 83 ML/MIN/1.73M2
GLUCOSE SERPL-MCNC: 141 MG/DL (ref 70–99)
HCT VFR BLD AUTO: 40.1 % (ref 36.3–47.1)
HGB BLD-MCNC: 13.7 G/DL (ref 11.9–15.1)
MCH RBC QN AUTO: 32.1 PG (ref 25.2–33.5)
MCHC RBC AUTO-ENTMCNC: 34.2 G/DL (ref 28.4–34.8)
MCV RBC AUTO: 93.9 FL (ref 82.6–102.9)
NRBC BLD-RTO: 0 PER 100 WBC
PLATELET # BLD AUTO: 276 K/UL (ref 138–453)
PMV BLD AUTO: 9.1 FL (ref 8.1–13.5)
POTASSIUM SERPL-SCNC: 4.7 MMOL/L (ref 3.7–5.3)
PROT SERPL-MCNC: 6.9 G/DL (ref 6.4–8.3)
RBC # BLD AUTO: 4.27 M/UL (ref 3.95–5.11)
SODIUM SERPL-SCNC: 137 MMOL/L (ref 135–144)
TSH SERPL DL<=0.05 MIU/L-ACNC: 1.67 UIU/ML (ref 0.3–5)
URATE SERPL-MCNC: 3.9 MG/DL (ref 2.4–5.7)
WBC OTHER # BLD: 8.4 K/UL (ref 3.5–11.3)

## 2024-06-10 PROCEDURE — 99215 OFFICE O/P EST HI 40 MIN: CPT | Performed by: NURSE PRACTITIONER

## 2024-06-10 PROCEDURE — 84443 ASSAY THYROID STIM HORMONE: CPT

## 2024-06-10 PROCEDURE — 85652 RBC SED RATE AUTOMATED: CPT

## 2024-06-10 PROCEDURE — 3078F DIAST BP <80 MM HG: CPT | Performed by: NURSE PRACTITIONER

## 2024-06-10 PROCEDURE — 82043 UR ALBUMIN QUANTITATIVE: CPT

## 2024-06-10 PROCEDURE — 84550 ASSAY OF BLOOD/URIC ACID: CPT

## 2024-06-10 PROCEDURE — 86140 C-REACTIVE PROTEIN: CPT

## 2024-06-10 PROCEDURE — 80053 COMPREHEN METABOLIC PANEL: CPT

## 2024-06-10 PROCEDURE — 82306 VITAMIN D 25 HYDROXY: CPT

## 2024-06-10 PROCEDURE — 82570 ASSAY OF URINE CREATININE: CPT

## 2024-06-10 PROCEDURE — 73630 X-RAY EXAM OF FOOT: CPT

## 2024-06-10 PROCEDURE — 80061 LIPID PANEL: CPT

## 2024-06-10 PROCEDURE — 73610 X-RAY EXAM OF ANKLE: CPT

## 2024-06-10 PROCEDURE — 3077F SYST BP >= 140 MM HG: CPT | Performed by: NURSE PRACTITIONER

## 2024-06-10 PROCEDURE — 71046 X-RAY EXAM CHEST 2 VIEWS: CPT

## 2024-06-10 PROCEDURE — 73521 X-RAY EXAM HIPS BI 2 VIEWS: CPT

## 2024-06-10 PROCEDURE — 36415 COLL VENOUS BLD VENIPUNCTURE: CPT

## 2024-06-10 PROCEDURE — 83036 HEMOGLOBIN GLYCOSYLATED A1C: CPT

## 2024-06-10 PROCEDURE — 85027 COMPLETE CBC AUTOMATED: CPT

## 2024-06-10 PROCEDURE — 93005 ELECTROCARDIOGRAM TRACING: CPT

## 2024-06-10 RX ORDER — SERDEXMETHYLPHENIDATE AND DEXMETHYLPHENIDATE 7.8; 39.2 MG/1; MG/1
CAPSULE ORAL
COMMUNITY
Start: 2024-05-20

## 2024-06-10 RX ORDER — DEXTROMETHORPHAN HYDROBROMIDE, BUPROPION HYDROCHLORIDE 105; 45 MG/1; MG/1
TABLET, MULTILAYER, EXTENDED RELEASE ORAL
COMMUNITY
Start: 2024-05-23

## 2024-06-10 RX ORDER — TRAZODONE HYDROCHLORIDE 100 MG/1
150 TABLET ORAL NIGHTLY PRN
Qty: 90 TABLET | Refills: 3 | Status: SHIPPED | OUTPATIENT
Start: 2024-06-10

## 2024-06-10 SDOH — ECONOMIC STABILITY: FOOD INSECURITY: WITHIN THE PAST 12 MONTHS, YOU WORRIED THAT YOUR FOOD WOULD RUN OUT BEFORE YOU GOT MONEY TO BUY MORE.: NEVER TRUE

## 2024-06-10 SDOH — ECONOMIC STABILITY: FOOD INSECURITY: WITHIN THE PAST 12 MONTHS, THE FOOD YOU BOUGHT JUST DIDN'T LAST AND YOU DIDN'T HAVE MONEY TO GET MORE.: NEVER TRUE

## 2024-06-10 SDOH — ECONOMIC STABILITY: INCOME INSECURITY: HOW HARD IS IT FOR YOU TO PAY FOR THE VERY BASICS LIKE FOOD, HOUSING, MEDICAL CARE, AND HEATING?: NOT HARD AT ALL

## 2024-06-10 ASSESSMENT — PATIENT HEALTH QUESTIONNAIRE - PHQ9
1. LITTLE INTEREST OR PLEASURE IN DOING THINGS: NOT AT ALL
SUM OF ALL RESPONSES TO PHQ QUESTIONS 1-9: 0
2. FEELING DOWN, DEPRESSED OR HOPELESS: NOT AT ALL
SUM OF ALL RESPONSES TO PHQ9 QUESTIONS 1 & 2: 0
SUM OF ALL RESPONSES TO PHQ QUESTIONS 1-9: 0

## 2024-06-10 NOTE — TELEPHONE ENCOUNTER
VO per Anahi Tapia CNP, Cancel BMP and order CMP. Provider will review guidelines on CT lung screening prior to ordering this to determine if this will be covered by insurance prior to ordering.

## 2024-06-10 NOTE — TELEPHONE ENCOUNTER
Michael wants to know if the BMP can be changed to a CMP and she would also like to have an order for a lung CT (for former smokers)

## 2024-06-10 NOTE — TELEPHONE ENCOUNTER
Please see below guidelines regarding American Cancer Society guidelines regarding CT lung screening.     \"The American Cancer Society recommends yearly screening for lung cancer with a low-dose CT (LDCT) scan for people aged 50 to 80 years who:    Smoke or used to smoke  AND  Have at least a 20 pack-year history of smoking    A pack-year is equal to smoking 1 pack (or about 20 cigarettes) per day for a year. For example, a person could have a 20 pack-year history by smoking 1 pack a day for 20 years, or by smoking 2 packs a day for 10 years.\"    Please let me know if the patient meets this criteria and I can order a CT if appropriate. Please also update her smoking hx in Epic.

## 2024-06-11 LAB
25(OH)D3 SERPL-MCNC: 37.3 NG/ML (ref 30–100)
CHOLEST SERPL-MCNC: 191 MG/DL (ref 0–199)
CHOLESTEROL/HDL RATIO: 2
CREAT UR-MCNC: 143 MG/DL (ref 28–217)
EKG ATRIAL RATE: 79 BPM
EKG P AXIS: 57 DEGREES
EKG P-R INTERVAL: 128 MS
EKG Q-T INTERVAL: 392 MS
EKG QRS DURATION: 82 MS
EKG QTC CALCULATION (BAZETT): 449 MS
EKG R AXIS: 1 DEGREES
EKG T AXIS: 56 DEGREES
EKG VENTRICULAR RATE: 79 BPM
EST. AVERAGE GLUCOSE BLD GHB EST-MCNC: 151 MG/DL
HBA1C MFR BLD: 6.9 % (ref 4–6)
HDLC SERPL-MCNC: 127 MG/DL
LDLC SERPL CALC-MCNC: 52 MG/DL (ref 0–100)
MICROALBUMIN UR-MCNC: <12 MG/L (ref 0–20)
MICROALBUMIN/CREAT UR-RTO: NORMAL MCG/MG CREAT (ref 0–25)
TRIGL SERPL-MCNC: 58 MG/DL
VLDLC SERPL CALC-MCNC: 12 MG/DL

## 2024-06-11 NOTE — TELEPHONE ENCOUNTER
Discussed patient's smoking history and that her total pack years are only 4.5 (updated smoking history) and that this does not qualify to meet the guidelines for coverage to have low dose CT lung screening. Patient verbalized understanding and will discuss this with her  and call back If she needs anything further.

## 2024-06-13 ENCOUNTER — TELEPHONE (OUTPATIENT)
Dept: PRIMARY CARE CLINIC | Age: 64
End: 2024-06-13

## 2024-06-13 DIAGNOSIS — M25.50 ARTHRALGIA, UNSPECIFIED JOINT: Primary | ICD-10-CM

## 2024-06-13 NOTE — TELEPHONE ENCOUNTER
----- Message from WILMER Samuel - CNP sent at 6/12/2024  7:39 PM EDT -----  Please notify patient XR left ankle WNL. Given her concerns with various MSK/joint pain, I recommend referral to orthopedics. Please let me know if agreeable and I will place referral to [facility] (or patient preference)

## 2024-06-14 DIAGNOSIS — M47.816 OSTEOARTHRITIS OF LUMBAR SPINE, UNSPECIFIED SPINAL OSTEOARTHRITIS COMPLICATION STATUS: Primary | ICD-10-CM

## 2024-06-14 DIAGNOSIS — M25.572 LEFT ANKLE PAIN, UNSPECIFIED CHRONICITY: ICD-10-CM

## 2024-06-14 DIAGNOSIS — M25.552 BILATERAL HIP PAIN: ICD-10-CM

## 2024-06-14 DIAGNOSIS — M25.551 BILATERAL HIP PAIN: ICD-10-CM

## 2024-07-01 DIAGNOSIS — F98.8 ATTENTION DEFICIT DISORDER, UNSPECIFIED HYPERACTIVITY PRESENCE: ICD-10-CM

## 2024-07-01 RX ORDER — DEXTROAMPHETAMINE SACCHARATE, AMPHETAMINE ASPARTATE MONOHYDRATE, DEXTROAMPHETAMINE SULFATE AND AMPHETAMINE SULFATE 7.5; 7.5; 7.5; 7.5 MG/1; MG/1; MG/1; MG/1
CAPSULE, EXTENDED RELEASE ORAL
Qty: 60 CAPSULE | Refills: 0 | Status: CANCELLED | OUTPATIENT
Start: 2024-07-01 | End: 2024-07-31

## 2024-07-01 NOTE — TELEPHONE ENCOUNTER
It is preferable that her psychiatrist would be the one to make these changes since he is actively managing her ADHD and associated medication changes.     Otherwise, patient would need to have an appt to be seen before able to prescribe this medication

## 2024-07-01 NOTE — TELEPHONE ENCOUNTER
Patient called in and states she leaves tomorrow for a trip for the holiday and wanted to run this past you. Patient recently seen phychiatric Dr. Estuardo Looney. She was given Azstarys for ADHD medication In replace of previous Adderall XR 30mg. Patient states that this medication has not been helping any. She was wondering if you would be willing to start her back on Adderall XR 30 BID or would you like for patient to be seen? Pharmacy is [facility].

## 2024-07-22 LAB — DIABETIC RETINOPATHY: NEGATIVE

## 2024-07-22 RX ORDER — ENALAPRIL MALEATE 2.5 MG/1
TABLET ORAL
Qty: 90 TABLET | Refills: 3 | Status: SHIPPED | OUTPATIENT
Start: 2024-07-22

## 2024-09-10 ENCOUNTER — APPOINTMENT (OUTPATIENT)
Dept: CT IMAGING | Age: 64
End: 2024-09-10
Payer: COMMERCIAL

## 2024-09-10 ENCOUNTER — APPOINTMENT (OUTPATIENT)
Dept: GENERAL RADIOLOGY | Age: 64
End: 2024-09-10
Payer: COMMERCIAL

## 2024-09-10 ENCOUNTER — HOSPITAL ENCOUNTER (INPATIENT)
Age: 64
LOS: 3 days | Discharge: HOME | End: 2024-09-13
Attending: INTERNAL MEDICINE | Admitting: INTERNAL MEDICINE
Payer: COMMERCIAL

## 2024-09-10 DIAGNOSIS — R79.89 ELEVATED LFTS: ICD-10-CM
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
## 2024-09-10 DIAGNOSIS — E10.9 TYPE 1 DIABETES MELLITUS WITHOUT COMPLICATION (HCC): ICD-10-CM

## 2024-09-10 DIAGNOSIS — S79.912A HIP INJURY, LEFT, INITIAL ENCOUNTER: ICD-10-CM

## 2024-09-10 PROBLEM — M25.552 ACUTE HIP PAIN, LEFT: Status: ACTIVE | Noted: 2024-09-10

## 2024-09-10 LAB
ABO + RH BLD: NORMAL
ANION GAP SERPL CALCULATED.3IONS-SCNC: 10 MMOL/L (ref 9–16)
ARM BAND NUMBER: NORMAL
BASOPHILS # BLD: 0.04 K/UL (ref 0–0.2)
BASOPHILS NFR BLD: 1 % (ref 0–2)
BLOOD BANK SAMPLE EXPIRATION: NORMAL
BLOOD GROUP ANTIBODIES SERPL: NEGATIVE
BUN SERPL-MCNC: 24 MG/DL (ref 8–23)
BUN/CREAT SERPL: 22 (ref 9–20)
CALCIUM SERPL-MCNC: 9.7 MG/DL (ref 8.6–10.4)
CHLORIDE SERPL-SCNC: 103 MMOL/L (ref 98–107)
CO2 SERPL-SCNC: 28 MMOL/L (ref 20–31)
CREAT SERPL-MCNC: 1.1 MG/DL (ref 0.5–0.9)
EOSINOPHIL # BLD: 0.12 K/UL (ref 0–0.44)
EOSINOPHILS RELATIVE PERCENT: 1 % (ref 1–4)
ERYTHROCYTE [DISTWIDTH] IN BLOOD BY AUTOMATED COUNT: 12.5 % (ref 11.8–14.4)
GFR, ESTIMATED: 60 ML/MIN/1.73M2
GLUCOSE BLD-MCNC: 101 MG/DL (ref 74–100)
GLUCOSE SERPL-MCNC: 160 MG/DL (ref 74–99)
HCT VFR BLD AUTO: 40.1 % (ref 36.3–47.1)
HGB BLD-MCNC: 14 G/DL (ref 11.9–15.1)
IMM GRANULOCYTES # BLD AUTO: <0.03 K/UL (ref 0–0.3)
IMM GRANULOCYTES NFR BLD: 0 %
INR PPP: 1
LYMPHOCYTES NFR BLD: 1.62 K/UL (ref 1.1–3.7)
LYMPHOCYTES RELATIVE PERCENT: 20 % (ref 24–43)
MCH RBC QN AUTO: 33.2 PG (ref 25.2–33.5)
MCHC RBC AUTO-ENTMCNC: 34.9 G/DL (ref 28.4–34.8)
MCV RBC AUTO: 95 FL (ref 82.6–102.9)
MONOCYTES NFR BLD: 0.69 K/UL (ref 0.1–1.2)
MONOCYTES NFR BLD: 8 % (ref 3–12)
NEUTROPHILS NFR BLD: 70 % (ref 36–65)
NEUTS SEG NFR BLD: 5.8 K/UL (ref 1.5–8.1)
NRBC BLD-RTO: 0 PER 100 WBC
PLATELET # BLD AUTO: 299 K/UL (ref 138–453)
PMV BLD AUTO: 9.5 FL (ref 8.1–13.5)
POTASSIUM SERPL-SCNC: 4.3 MMOL/L (ref 3.7–5.3)
PROTHROMBIN TIME: 12.5 SEC (ref 11.7–14.1)
RBC # BLD AUTO: 4.22 M/UL (ref 3.95–5.11)
SODIUM SERPL-SCNC: 141 MMOL/L (ref 136–145)
WBC OTHER # BLD: 8.3 K/UL (ref 3.5–11.3)

## 2024-09-10 PROCEDURE — 96376 TX/PRO/DX INJ SAME DRUG ADON: CPT

## 2024-09-10 PROCEDURE — 2580000003 HC RX 258: Performed by: PHYSICIAN ASSISTANT

## 2024-09-10 PROCEDURE — 93005 ELECTROCARDIOGRAM TRACING: CPT | Performed by: PHYSICIAN ASSISTANT

## 2024-09-10 PROCEDURE — 6360000002 HC RX W HCPCS: Performed by: PHYSICIAN ASSISTANT

## 2024-09-10 PROCEDURE — 85025 COMPLETE CBC W/AUTO DIFF WBC: CPT

## 2024-09-10 PROCEDURE — 1200000000 HC SEMI PRIVATE

## 2024-09-10 PROCEDURE — 86901 BLOOD TYPING SEROLOGIC RH(D): CPT

## 2024-09-10 PROCEDURE — 96374 THER/PROPH/DIAG INJ IV PUSH: CPT

## 2024-09-10 PROCEDURE — 6370000000 HC RX 637 (ALT 250 FOR IP): Performed by: INTERNAL MEDICINE

## 2024-09-10 PROCEDURE — 96375 TX/PRO/DX INJ NEW DRUG ADDON: CPT

## 2024-09-10 PROCEDURE — 6360000002 HC RX W HCPCS: Performed by: INTERNAL MEDICINE

## 2024-09-10 PROCEDURE — 86850 RBC ANTIBODY SCREEN: CPT

## 2024-09-10 PROCEDURE — 71045 X-RAY EXAM CHEST 1 VIEW: CPT

## 2024-09-10 PROCEDURE — 6370000000 HC RX 637 (ALT 250 FOR IP)

## 2024-09-10 PROCEDURE — 80048 BASIC METABOLIC PNL TOTAL CA: CPT

## 2024-09-10 PROCEDURE — 85610 PROTHROMBIN TIME: CPT

## 2024-09-10 PROCEDURE — G0378 HOSPITAL OBSERVATION PER HR: HCPCS

## 2024-09-10 PROCEDURE — 86900 BLOOD TYPING SEROLOGIC ABO: CPT

## 2024-09-10 PROCEDURE — 82947 ASSAY GLUCOSE BLOOD QUANT: CPT

## 2024-09-10 PROCEDURE — 73502 X-RAY EXAM HIP UNI 2-3 VIEWS: CPT

## 2024-09-10 PROCEDURE — 94761 N-INVAS EAR/PLS OXIMETRY MLT: CPT

## 2024-09-10 PROCEDURE — 73700 CT LOWER EXTREMITY W/O DYE: CPT

## 2024-09-10 PROCEDURE — 99285 EMERGENCY DEPT VISIT HI MDM: CPT

## 2024-09-10 RX ORDER — DEXTROAMPHETAMINE SACCHARATE, AMPHETAMINE ASPARTATE MONOHYDRATE, DEXTROAMPHETAMINE SULFATE AND AMPHETAMINE SULFATE 1.25; 1.25; 1.25; 1.25 MG/1; MG/1; MG/1; MG/1
25 CAPSULE, EXTENDED RELEASE ORAL EVERY MORNING
COMMUNITY

## 2024-09-10 RX ORDER — ATORVASTATIN CALCIUM 10 MG/1
10 TABLET, FILM COATED ORAL DAILY
Status: DISCONTINUED | OUTPATIENT
Start: 2024-09-10 | End: 2024-09-13 | Stop reason: HOSPADM

## 2024-09-10 RX ORDER — TRAZODONE HYDROCHLORIDE 50 MG/1
150 TABLET, FILM COATED ORAL NIGHTLY PRN
Status: DISCONTINUED | OUTPATIENT
Start: 2024-09-10 | End: 2024-09-13 | Stop reason: HOSPADM

## 2024-09-10 RX ORDER — ONDANSETRON 4 MG/1
4 TABLET, ORALLY DISINTEGRATING ORAL EVERY 8 HOURS PRN
Status: DISCONTINUED | OUTPATIENT
Start: 2024-09-10 | End: 2024-09-13 | Stop reason: HOSPADM

## 2024-09-10 RX ORDER — DEXTROAMPHETAMINE SACCHARATE, AMPHETAMINE ASPARTATE MONOHYDRATE, DEXTROAMPHETAMINE SULFATE AND AMPHETAMINE SULFATE 1.25; 1.25; 1.25; 1.25 MG/1; MG/1; MG/1; MG/1
25 CAPSULE, EXTENDED RELEASE ORAL EVERY MORNING
Status: DISCONTINUED | OUTPATIENT
Start: 2024-09-11 | End: 2024-09-13 | Stop reason: HOSPADM

## 2024-09-10 RX ORDER — MORPHINE SULFATE 4 MG/ML
4 INJECTION, SOLUTION INTRAMUSCULAR; INTRAVENOUS
Status: COMPLETED | OUTPATIENT
Start: 2024-09-10 | End: 2024-09-11

## 2024-09-10 RX ORDER — POLYETHYLENE GLYCOL 3350 17 G/17G
17 POWDER, FOR SOLUTION ORAL DAILY PRN
Status: DISCONTINUED | OUTPATIENT
Start: 2024-09-10 | End: 2024-09-13 | Stop reason: HOSPADM

## 2024-09-10 RX ORDER — POTASSIUM CHLORIDE 1500 MG/1
40 TABLET, EXTENDED RELEASE ORAL PRN
Status: DISCONTINUED | OUTPATIENT
Start: 2024-09-10 | End: 2024-09-13 | Stop reason: HOSPADM

## 2024-09-10 RX ORDER — POTASSIUM CHLORIDE 7.45 MG/ML
10 INJECTION INTRAVENOUS PRN
Status: DISCONTINUED | OUTPATIENT
Start: 2024-09-10 | End: 2024-09-13 | Stop reason: HOSPADM

## 2024-09-10 RX ORDER — MORPHINE SULFATE 2 MG/ML
2 INJECTION, SOLUTION INTRAMUSCULAR; INTRAVENOUS EVERY 4 HOURS PRN
Status: DISCONTINUED | OUTPATIENT
Start: 2024-09-10 | End: 2024-09-13 | Stop reason: HOSPADM

## 2024-09-10 RX ORDER — SODIUM CHLORIDE 9 MG/ML
INJECTION, SOLUTION INTRAVENOUS CONTINUOUS
Status: DISCONTINUED | OUTPATIENT
Start: 2024-09-10 | End: 2024-09-10

## 2024-09-10 RX ORDER — SODIUM CHLORIDE 0.9 % (FLUSH) 0.9 %
10 SYRINGE (ML) INJECTION PRN
Status: DISCONTINUED | OUTPATIENT
Start: 2024-09-10 | End: 2024-09-13 | Stop reason: HOSPADM

## 2024-09-10 RX ORDER — SODIUM CHLORIDE 0.9 % (FLUSH) 0.9 %
5-40 SYRINGE (ML) INJECTION EVERY 12 HOURS SCHEDULED
Status: DISCONTINUED | OUTPATIENT
Start: 2024-09-10 | End: 2024-09-13 | Stop reason: HOSPADM

## 2024-09-10 RX ORDER — MAGNESIUM SULFATE IN WATER 40 MG/ML
2000 INJECTION, SOLUTION INTRAVENOUS PRN
Status: DISCONTINUED | OUTPATIENT
Start: 2024-09-10 | End: 2024-09-13 | Stop reason: HOSPADM

## 2024-09-10 RX ORDER — DILTIAZEM HYDROCHLORIDE 180 MG/1
360 CAPSULE, COATED, EXTENDED RELEASE ORAL NIGHTLY
Status: DISCONTINUED | OUTPATIENT
Start: 2024-09-10 | End: 2024-09-13 | Stop reason: HOSPADM

## 2024-09-10 RX ORDER — DILTIAZEM HYDROCHLORIDE 180 MG/1
360 CAPSULE, COATED, EXTENDED RELEASE ORAL DAILY
Status: DISCONTINUED | OUTPATIENT
Start: 2024-09-11 | End: 2024-09-10

## 2024-09-10 RX ORDER — ONDANSETRON 2 MG/ML
4 INJECTION INTRAMUSCULAR; INTRAVENOUS EVERY 4 HOURS PRN
Status: COMPLETED | OUTPATIENT
Start: 2024-09-10 | End: 2024-09-12

## 2024-09-10 RX ORDER — ONDANSETRON 2 MG/ML
4 INJECTION INTRAMUSCULAR; INTRAVENOUS EVERY 6 HOURS PRN
Status: DISCONTINUED | OUTPATIENT
Start: 2024-09-10 | End: 2024-09-13 | Stop reason: HOSPADM

## 2024-09-10 RX ORDER — SODIUM CHLORIDE 9 MG/ML
INJECTION, SOLUTION INTRAVENOUS PRN
Status: DISCONTINUED | OUTPATIENT
Start: 2024-09-10 | End: 2024-09-13 | Stop reason: HOSPADM

## 2024-09-10 RX ORDER — SODIUM CHLORIDE 9 MG/ML
INJECTION, SOLUTION INTRAVENOUS CONTINUOUS
Status: DISCONTINUED | OUTPATIENT
Start: 2024-09-10 | End: 2024-09-11

## 2024-09-10 RX ORDER — ENALAPRIL MALEATE 2.5 MG/1
2.5 TABLET ORAL DAILY
Status: DISCONTINUED | OUTPATIENT
Start: 2024-09-11 | End: 2024-09-10

## 2024-09-10 RX ORDER — ACETAMINOPHEN 650 MG/1
650 SUPPOSITORY RECTAL EVERY 6 HOURS PRN
Status: DISCONTINUED | OUTPATIENT
Start: 2024-09-10 | End: 2024-09-12

## 2024-09-10 RX ORDER — ACETAMINOPHEN 325 MG/1
650 TABLET ORAL EVERY 6 HOURS PRN
Status: DISCONTINUED | OUTPATIENT
Start: 2024-09-10 | End: 2024-09-12

## 2024-09-10 RX ORDER — ENALAPRIL MALEATE 2.5 MG/1
2.5 TABLET ORAL NIGHTLY
Status: DISCONTINUED | OUTPATIENT
Start: 2024-09-10 | End: 2024-09-13 | Stop reason: HOSPADM

## 2024-09-10 RX ADMIN — DILTIAZEM HYDROCHLORIDE 360 MG: 180 CAPSULE, COATED, EXTENDED RELEASE ORAL at 23:31

## 2024-09-10 RX ADMIN — SODIUM CHLORIDE: 9 INJECTION, SOLUTION INTRAVENOUS at 21:39

## 2024-09-10 RX ADMIN — ONDANSETRON 4 MG: 2 INJECTION INTRAMUSCULAR; INTRAVENOUS at 21:40

## 2024-09-10 RX ADMIN — ATORVASTATIN CALCIUM 10 MG: 10 TABLET, FILM COATED ORAL at 23:31

## 2024-09-10 RX ADMIN — MORPHINE SULFATE 2 MG: 2 INJECTION, SOLUTION INTRAMUSCULAR; INTRAVENOUS at 23:31

## 2024-09-10 RX ADMIN — ENALAPRIL MALEATE 2.5 MG: 2.5 TABLET ORAL at 23:31

## 2024-09-10 RX ADMIN — MORPHINE SULFATE 4 MG: 4 INJECTION, SOLUTION INTRAMUSCULAR; INTRAVENOUS at 21:40

## 2024-09-10 ASSESSMENT — PAIN DESCRIPTION - DESCRIPTORS
DESCRIPTORS: ACHING;STABBING
DESCRIPTORS: ACHING
DESCRIPTORS: ACHING;SHARP
DESCRIPTORS: SHARP;ACHING

## 2024-09-10 ASSESSMENT — PAIN DESCRIPTION - ORIENTATION
ORIENTATION: LEFT

## 2024-09-10 ASSESSMENT — ENCOUNTER SYMPTOMS
BACK PAIN: 0
ABDOMINAL PAIN: 0
SHORTNESS OF BREATH: 0
COUGH: 0

## 2024-09-10 ASSESSMENT — PAIN SCALES - GENERAL
PAINLEVEL_OUTOF10: 8
PAINLEVEL_OUTOF10: 6
PAINLEVEL_OUTOF10: 8
PAINLEVEL_OUTOF10: 8

## 2024-09-10 ASSESSMENT — PAIN - FUNCTIONAL ASSESSMENT
PAIN_FUNCTIONAL_ASSESSMENT: PREVENTS OR INTERFERES SOME ACTIVE ACTIVITIES AND ADLS
PAIN_FUNCTIONAL_ASSESSMENT: PREVENTS OR INTERFERES SOME ACTIVE ACTIVITIES AND ADLS
PAIN_FUNCTIONAL_ASSESSMENT: PREVENTS OR INTERFERES WITH ALL ACTIVE AND SOME PASSIVE ACTIVITIES
PAIN_FUNCTIONAL_ASSESSMENT: 0-10

## 2024-09-10 ASSESSMENT — PAIN DESCRIPTION - LOCATION
LOCATION: HIP

## 2024-09-10 ASSESSMENT — LIFESTYLE VARIABLES: HOW OFTEN DO YOU HAVE A DRINK CONTAINING ALCOHOL: NEVER

## 2024-09-11 ENCOUNTER — APPOINTMENT (OUTPATIENT)
Dept: MRI IMAGING | Age: 64
End: 2024-09-11
Payer: COMMERCIAL

## 2024-09-11 LAB
ALBUMIN SERPL-MCNC: 3.6 G/DL (ref 3.5–5.2)
ALBUMIN/GLOB SERPL: 2 {RATIO} (ref 1–2.5)
ALP SERPL-CCNC: 88 U/L (ref 35–104)
ALT SERPL-CCNC: 57 U/L (ref 10–35)
ANION GAP SERPL CALCULATED.3IONS-SCNC: 9 MMOL/L (ref 9–16)
AST SERPL-CCNC: 119 U/L (ref 10–35)
BASOPHILS # BLD: 0.05 K/UL (ref 0–0.2)
BASOPHILS NFR BLD: 1 % (ref 0–2)
BILIRUB SERPL-MCNC: 0.3 MG/DL (ref 0–1.2)
BILIRUB UR QL STRIP: NEGATIVE
BUN SERPL-MCNC: 18 MG/DL (ref 8–23)
BUN/CREAT SERPL: 23 (ref 9–20)
CALCIUM SERPL-MCNC: 8.6 MG/DL (ref 8.6–10.4)
CHLORIDE SERPL-SCNC: 106 MMOL/L (ref 98–107)
CLARITY UR: CLEAR
CO2 SERPL-SCNC: 25 MMOL/L (ref 20–31)
COLOR UR: YELLOW
CREAT SERPL-MCNC: 0.8 MG/DL (ref 0.5–0.9)
EKG ATRIAL RATE: 70 BPM
EKG P AXIS: 46 DEGREES
EKG P-R INTERVAL: 118 MS
EKG Q-T INTERVAL: 384 MS
EKG QRS DURATION: 76 MS
EKG QTC CALCULATION (BAZETT): 414 MS
EKG R AXIS: 24 DEGREES
EKG T AXIS: 35 DEGREES
EKG VENTRICULAR RATE: 70 BPM
EOSINOPHIL # BLD: 0.21 K/UL (ref 0–0.44)
EOSINOPHILS RELATIVE PERCENT: 3 % (ref 1–4)
EPI CELLS #/AREA URNS HPF: NORMAL /HPF (ref 0–25)
ERYTHROCYTE [DISTWIDTH] IN BLOOD BY AUTOMATED COUNT: 12.6 % (ref 11.8–14.4)
GFR, ESTIMATED: 80 ML/MIN/1.73M2
GLUCOSE BLD-MCNC: 151 MG/DL (ref 74–100)
GLUCOSE SERPL-MCNC: 143 MG/DL (ref 74–99)
GLUCOSE UR STRIP-MCNC: ABNORMAL MG/DL
HCT VFR BLD AUTO: 37.3 % (ref 36.3–47.1)
HGB BLD-MCNC: 12.7 G/DL (ref 11.9–15.1)
HGB UR QL STRIP.AUTO: NEGATIVE
IMM GRANULOCYTES # BLD AUTO: <0.03 K/UL (ref 0–0.3)
IMM GRANULOCYTES NFR BLD: 0 %
KETONES UR STRIP-MCNC: NEGATIVE MG/DL
LEUKOCYTE ESTERASE UR QL STRIP: NEGATIVE
LYMPHOCYTES NFR BLD: 1.88 K/UL (ref 1.1–3.7)
LYMPHOCYTES RELATIVE PERCENT: 25 % (ref 24–43)
MCH RBC QN AUTO: 32.6 PG (ref 25.2–33.5)
MCHC RBC AUTO-ENTMCNC: 34 G/DL (ref 28.4–34.8)
MCV RBC AUTO: 95.9 FL (ref 82.6–102.9)
MONOCYTES NFR BLD: 0.75 K/UL (ref 0.1–1.2)
MONOCYTES NFR BLD: 10 % (ref 3–12)
NEUTROPHILS NFR BLD: 61 % (ref 36–65)
NEUTS SEG NFR BLD: 4.69 K/UL (ref 1.5–8.1)
NITRITE UR QL STRIP: NEGATIVE
NRBC BLD-RTO: 0 PER 100 WBC
PH UR STRIP: 6 [PH] (ref 5–9)
PLATELET # BLD AUTO: 272 K/UL (ref 138–453)
PMV BLD AUTO: 9.4 FL (ref 8.1–13.5)
POTASSIUM SERPL-SCNC: 4.2 MMOL/L (ref 3.7–5.3)
PROT SERPL-MCNC: 5.4 G/DL (ref 6.6–8.7)
PROT UR STRIP-MCNC: NEGATIVE MG/DL
RBC # BLD AUTO: 3.89 M/UL (ref 3.95–5.11)
RBC #/AREA URNS HPF: NORMAL /HPF (ref 0–2)
SODIUM SERPL-SCNC: 140 MMOL/L (ref 136–145)
SP GR UR STRIP: 1.02 (ref 1.01–1.02)
UROBILINOGEN UR STRIP-ACNC: NORMAL EU/DL (ref 0–1)
WBC #/AREA URNS HPF: NORMAL /HPF (ref 0–5)
WBC OTHER # BLD: 7.6 K/UL (ref 3.5–11.3)

## 2024-09-11 PROCEDURE — 82947 ASSAY GLUCOSE BLOOD QUANT: CPT

## 2024-09-11 PROCEDURE — 6370000000 HC RX 637 (ALT 250 FOR IP): Performed by: NURSE PRACTITIONER

## 2024-09-11 PROCEDURE — 2580000003 HC RX 258: Performed by: INTERNAL MEDICINE

## 2024-09-11 PROCEDURE — 6360000002 HC RX W HCPCS: Performed by: INTERNAL MEDICINE

## 2024-09-11 PROCEDURE — 97162 PT EVAL MOD COMPLEX 30 MIN: CPT

## 2024-09-11 PROCEDURE — 6370000000 HC RX 637 (ALT 250 FOR IP)

## 2024-09-11 PROCEDURE — 6370000000 HC RX 637 (ALT 250 FOR IP): Performed by: INTERNAL MEDICINE

## 2024-09-11 PROCEDURE — 80053 COMPREHEN METABOLIC PANEL: CPT

## 2024-09-11 PROCEDURE — 73721 MRI JNT OF LWR EXTRE W/O DYE: CPT

## 2024-09-11 PROCEDURE — 96372 THER/PROPH/DIAG INJ SC/IM: CPT

## 2024-09-11 PROCEDURE — 96376 TX/PRO/DX INJ SAME DRUG ADON: CPT

## 2024-09-11 PROCEDURE — 81001 URINALYSIS AUTO W/SCOPE: CPT

## 2024-09-11 PROCEDURE — G0378 HOSPITAL OBSERVATION PER HR: HCPCS

## 2024-09-11 PROCEDURE — 93010 ELECTROCARDIOGRAM REPORT: CPT | Performed by: INTERNAL MEDICINE

## 2024-09-11 PROCEDURE — 6360000002 HC RX W HCPCS: Performed by: NURSE PRACTITIONER

## 2024-09-11 PROCEDURE — 85025 COMPLETE CBC W/AUTO DIFF WBC: CPT

## 2024-09-11 PROCEDURE — 94761 N-INVAS EAR/PLS OXIMETRY MLT: CPT

## 2024-09-11 PROCEDURE — 36415 COLL VENOUS BLD VENIPUNCTURE: CPT

## 2024-09-11 PROCEDURE — 1200000000 HC SEMI PRIVATE

## 2024-09-11 RX ORDER — MELOXICAM 7.5 MG/1
7.5 TABLET ORAL DAILY
Status: DISCONTINUED | OUTPATIENT
Start: 2024-09-11 | End: 2024-09-13 | Stop reason: HOSPADM

## 2024-09-11 RX ORDER — HYDROCODONE BITARTRATE AND ACETAMINOPHEN 5; 325 MG/1; MG/1
2 TABLET ORAL EVERY 4 HOURS PRN
Status: DISCONTINUED | OUTPATIENT
Start: 2024-09-11 | End: 2024-09-12

## 2024-09-11 RX ORDER — HYDROCODONE BITARTRATE AND ACETAMINOPHEN 5; 325 MG/1; MG/1
1 TABLET ORAL EVERY 4 HOURS PRN
Status: DISCONTINUED | OUTPATIENT
Start: 2024-09-11 | End: 2024-09-12

## 2024-09-11 RX ORDER — DEXTROSE MONOHYDRATE 100 MG/ML
INJECTION, SOLUTION INTRAVENOUS CONTINUOUS PRN
Status: DISCONTINUED | OUTPATIENT
Start: 2024-09-11 | End: 2024-09-13 | Stop reason: HOSPADM

## 2024-09-11 RX ORDER — GLUCAGON 1 MG/ML
1 KIT INJECTION PRN
Status: DISCONTINUED | OUTPATIENT
Start: 2024-09-11 | End: 2024-09-13 | Stop reason: HOSPADM

## 2024-09-11 RX ORDER — ENOXAPARIN SODIUM 100 MG/ML
30 INJECTION SUBCUTANEOUS DAILY
Status: DISCONTINUED | OUTPATIENT
Start: 2024-09-11 | End: 2024-09-13 | Stop reason: HOSPADM

## 2024-09-11 RX ADMIN — SODIUM CHLORIDE, PRESERVATIVE FREE 10 ML: 5 INJECTION INTRAVENOUS at 08:00

## 2024-09-11 RX ADMIN — DILTIAZEM HYDROCHLORIDE 360 MG: 180 CAPSULE, COATED, EXTENDED RELEASE ORAL at 21:02

## 2024-09-11 RX ADMIN — MORPHINE SULFATE 4 MG: 4 INJECTION, SOLUTION INTRAMUSCULAR; INTRAVENOUS at 03:07

## 2024-09-11 RX ADMIN — ATORVASTATIN CALCIUM 10 MG: 10 TABLET, FILM COATED ORAL at 21:02

## 2024-09-11 RX ADMIN — HYDROCODONE BITARTRATE AND ACETAMINOPHEN 1 TABLET: 5; 325 TABLET ORAL at 16:03

## 2024-09-11 RX ADMIN — MORPHINE SULFATE 4 MG: 4 INJECTION, SOLUTION INTRAMUSCULAR; INTRAVENOUS at 10:47

## 2024-09-11 RX ADMIN — MORPHINE SULFATE 2 MG: 2 INJECTION, SOLUTION INTRAMUSCULAR; INTRAVENOUS at 07:53

## 2024-09-11 RX ADMIN — HYDROCODONE BITARTRATE AND ACETAMINOPHEN 2 TABLET: 5; 325 TABLET ORAL at 21:03

## 2024-09-11 RX ADMIN — TRAZODONE HYDROCHLORIDE 150 MG: 50 TABLET ORAL at 21:02

## 2024-09-11 RX ADMIN — SODIUM CHLORIDE, PRESERVATIVE FREE 10 ML: 5 INJECTION INTRAVENOUS at 21:05

## 2024-09-11 RX ADMIN — ACETAMINOPHEN 650 MG: 325 TABLET ORAL at 07:53

## 2024-09-11 RX ADMIN — ENOXAPARIN SODIUM 30 MG: 100 INJECTION SUBCUTANEOUS at 17:56

## 2024-09-11 RX ADMIN — ENALAPRIL MALEATE 2.5 MG: 2.5 TABLET ORAL at 21:02

## 2024-09-11 ASSESSMENT — PAIN SCALES - GENERAL
PAINLEVEL_OUTOF10: 6
PAINLEVEL_OUTOF10: 7
PAINLEVEL_OUTOF10: 0
PAINLEVEL_OUTOF10: 7
PAINLEVEL_OUTOF10: 4

## 2024-09-11 ASSESSMENT — PAIN DESCRIPTION - ORIENTATION
ORIENTATION: LEFT

## 2024-09-11 ASSESSMENT — PAIN DESCRIPTION - ONSET: ONSET: PROGRESSIVE

## 2024-09-11 ASSESSMENT — PAIN DESCRIPTION - DIRECTION: RADIATING_TOWARDS: BACK

## 2024-09-11 ASSESSMENT — PAIN DESCRIPTION - PAIN TYPE
TYPE: ACUTE PAIN
TYPE: ACUTE PAIN

## 2024-09-11 ASSESSMENT — PAIN DESCRIPTION - DESCRIPTORS
DESCRIPTORS: THROBBING;SHARP
DESCRIPTORS: ACHING;SHARP
DESCRIPTORS: ACHING
DESCRIPTORS: THROBBING;SHARP
DESCRIPTORS: ACHING;THROBBING;DISCOMFORT

## 2024-09-11 ASSESSMENT — PAIN DESCRIPTION - LOCATION
LOCATION: HIP

## 2024-09-11 ASSESSMENT — PAIN DESCRIPTION - FREQUENCY
FREQUENCY: INTERMITTENT
FREQUENCY: INTERMITTENT

## 2024-09-12 ENCOUNTER — APPOINTMENT (OUTPATIENT)
Dept: ULTRASOUND IMAGING | Age: 64
End: 2024-09-12
Payer: COMMERCIAL

## 2024-09-12 PROBLEM — Z86.0100 HISTORY OF COLON POLYPS: Status: RESOLVED | Noted: 2019-10-23 | Resolved: 2024-09-12

## 2024-09-12 PROBLEM — S76.812A STRAIN OF ILIOPSOAS MUSCLE, LEFT, INITIAL ENCOUNTER: Status: ACTIVE | Noted: 2024-09-12

## 2024-09-12 PROBLEM — Z90.49 S/P CHOLECYSTECTOMY: Status: RESOLVED | Noted: 2018-09-21 | Resolved: 2024-09-12

## 2024-09-12 PROBLEM — K81.1 CHRONIC CHOLECYSTITIS: Status: RESOLVED | Noted: 2018-09-21 | Resolved: 2024-09-12

## 2024-09-12 PROBLEM — R63.5 WEIGHT GAIN: Status: RESOLVED | Noted: 2023-02-03 | Resolved: 2024-09-12

## 2024-09-12 PROBLEM — Z86.010 HISTORY OF COLON POLYPS: Status: RESOLVED | Noted: 2019-10-23 | Resolved: 2024-09-12

## 2024-09-12 PROBLEM — E66.3 OVERWEIGHT (BMI 25.0-29.9): Status: RESOLVED | Noted: 2023-02-03 | Resolved: 2024-09-12

## 2024-09-12 LAB
ALBUMIN SERPL-MCNC: 3.6 G/DL (ref 3.5–5.2)
ALBUMIN SERPL-MCNC: 3.7 G/DL (ref 3.5–5.2)
ALBUMIN/GLOB SERPL: 1.9 {RATIO} (ref 1–2.5)
ALBUMIN/GLOB SERPL: 1.9 {RATIO} (ref 1–2.5)
ALP SERPL-CCNC: 132 U/L (ref 35–104)
ALP SERPL-CCNC: 138 U/L (ref 35–104)
ALT SERPL-CCNC: 311 U/L (ref 10–35)
ALT SERPL-CCNC: 340 U/L (ref 10–35)
ANION GAP SERPL CALCULATED.3IONS-SCNC: 8 MMOL/L (ref 9–16)
AST SERPL-CCNC: 215 U/L (ref 10–35)
AST SERPL-CCNC: 272 U/L (ref 10–35)
BASOPHILS # BLD: 0.05 K/UL (ref 0–0.2)
BASOPHILS NFR BLD: 1 % (ref 0–2)
BILIRUB DIRECT SERPL-MCNC: 0.3 MG/DL (ref 0–0.3)
BILIRUB INDIRECT SERPL-MCNC: 0.2 MG/DL (ref 0–1)
BILIRUB SERPL-MCNC: 0.5 MG/DL (ref 0–1.2)
BILIRUB SERPL-MCNC: 0.5 MG/DL (ref 0–1.2)
BUN SERPL-MCNC: 20 MG/DL (ref 8–23)
BUN/CREAT SERPL: 29 (ref 9–20)
CALCIUM SERPL-MCNC: 8.7 MG/DL (ref 8.6–10.4)
CHLORIDE SERPL-SCNC: 104 MMOL/L (ref 98–107)
CO2 SERPL-SCNC: 25 MMOL/L (ref 20–31)
CREAT SERPL-MCNC: 0.7 MG/DL (ref 0.5–0.9)
EOSINOPHIL # BLD: 0.1 K/UL (ref 0–0.44)
EOSINOPHILS RELATIVE PERCENT: 2 % (ref 1–4)
ERYTHROCYTE [DISTWIDTH] IN BLOOD BY AUTOMATED COUNT: 12.4 % (ref 11.8–14.4)
GFR, ESTIMATED: >90 ML/MIN/1.73M2
GLUCOSE SERPL-MCNC: 132 MG/DL (ref 74–99)
HCT VFR BLD AUTO: 37.3 % (ref 36.3–47.1)
HGB BLD-MCNC: 12.5 G/DL (ref 11.9–15.1)
IMM GRANULOCYTES # BLD AUTO: <0.03 K/UL (ref 0–0.3)
IMM GRANULOCYTES NFR BLD: 0 %
LIPASE SERPL-CCNC: 18 U/L (ref 13–60)
LYMPHOCYTES NFR BLD: 0.81 K/UL (ref 1.1–3.7)
LYMPHOCYTES RELATIVE PERCENT: 12 % (ref 24–43)
MCH RBC QN AUTO: 32.1 PG (ref 25.2–33.5)
MCHC RBC AUTO-ENTMCNC: 33.5 G/DL (ref 28.4–34.8)
MCV RBC AUTO: 95.9 FL (ref 82.6–102.9)
MONOCYTES NFR BLD: 0.45 K/UL (ref 0.1–1.2)
MONOCYTES NFR BLD: 7 % (ref 3–12)
NEUTROPHILS NFR BLD: 78 % (ref 36–65)
NEUTS SEG NFR BLD: 5.4 K/UL (ref 1.5–8.1)
NRBC BLD-RTO: 0 PER 100 WBC
PLATELET # BLD AUTO: 253 K/UL (ref 138–453)
PMV BLD AUTO: 9.3 FL (ref 8.1–13.5)
POTASSIUM SERPL-SCNC: 4.4 MMOL/L (ref 3.7–5.3)
PROT SERPL-MCNC: 5.5 G/DL (ref 6.6–8.7)
PROT SERPL-MCNC: 5.6 G/DL (ref 6.6–8.7)
RBC # BLD AUTO: 3.89 M/UL (ref 3.95–5.11)
SODIUM SERPL-SCNC: 137 MMOL/L (ref 136–145)
WBC OTHER # BLD: 6.8 K/UL (ref 3.5–11.3)

## 2024-09-12 PROCEDURE — 80076 HEPATIC FUNCTION PANEL: CPT

## 2024-09-12 PROCEDURE — 83690 ASSAY OF LIPASE: CPT

## 2024-09-12 PROCEDURE — 96376 TX/PRO/DX INJ SAME DRUG ADON: CPT

## 2024-09-12 PROCEDURE — 85025 COMPLETE CBC W/AUTO DIFF WBC: CPT

## 2024-09-12 PROCEDURE — 80053 COMPREHEN METABOLIC PANEL: CPT

## 2024-09-12 PROCEDURE — 6370000000 HC RX 637 (ALT 250 FOR IP)

## 2024-09-12 PROCEDURE — 96375 TX/PRO/DX INJ NEW DRUG ADDON: CPT

## 2024-09-12 PROCEDURE — 97110 THERAPEUTIC EXERCISES: CPT

## 2024-09-12 PROCEDURE — 76705 ECHO EXAM OF ABDOMEN: CPT

## 2024-09-12 PROCEDURE — 97116 GAIT TRAINING THERAPY: CPT

## 2024-09-12 PROCEDURE — 2580000003 HC RX 258: Performed by: NURSE PRACTITIONER

## 2024-09-12 PROCEDURE — 6370000000 HC RX 637 (ALT 250 FOR IP): Performed by: NURSE PRACTITIONER

## 2024-09-12 PROCEDURE — 6360000002 HC RX W HCPCS: Performed by: NURSE PRACTITIONER

## 2024-09-12 PROCEDURE — G0378 HOSPITAL OBSERVATION PER HR: HCPCS

## 2024-09-12 PROCEDURE — 6370000000 HC RX 637 (ALT 250 FOR IP): Performed by: INTERNAL MEDICINE

## 2024-09-12 PROCEDURE — 6360000002 HC RX W HCPCS: Performed by: INTERNAL MEDICINE

## 2024-09-12 PROCEDURE — 6360000002 HC RX W HCPCS

## 2024-09-12 PROCEDURE — 96372 THER/PROPH/DIAG INJ SC/IM: CPT

## 2024-09-12 PROCEDURE — 1200000000 HC SEMI PRIVATE

## 2024-09-12 PROCEDURE — 2580000003 HC RX 258: Performed by: INTERNAL MEDICINE

## 2024-09-12 PROCEDURE — 94761 N-INVAS EAR/PLS OXIMETRY MLT: CPT

## 2024-09-12 RX ORDER — SODIUM CHLORIDE 9 MG/ML
INJECTION, SOLUTION INTRAVENOUS CONTINUOUS
Status: DISCONTINUED | OUTPATIENT
Start: 2024-09-12 | End: 2024-09-13

## 2024-09-12 RX ORDER — METOCLOPRAMIDE HYDROCHLORIDE 5 MG/ML
10 INJECTION INTRAMUSCULAR; INTRAVENOUS ONCE
Status: COMPLETED | OUTPATIENT
Start: 2024-09-12 | End: 2024-09-12

## 2024-09-12 RX ORDER — OXYCODONE HYDROCHLORIDE 5 MG/1
10 TABLET ORAL ONCE
Status: COMPLETED | OUTPATIENT
Start: 2024-09-12 | End: 2024-09-12

## 2024-09-12 RX ORDER — OXYCODONE HYDROCHLORIDE 5 MG/1
5 TABLET ORAL EVERY 4 HOURS PRN
Status: DISCONTINUED | OUTPATIENT
Start: 2024-09-12 | End: 2024-09-13

## 2024-09-12 RX ADMIN — DILTIAZEM HYDROCHLORIDE 360 MG: 180 CAPSULE, COATED, EXTENDED RELEASE ORAL at 21:06

## 2024-09-12 RX ADMIN — SODIUM CHLORIDE: 9 INJECTION, SOLUTION INTRAVENOUS at 08:05

## 2024-09-12 RX ADMIN — ONDANSETRON 4 MG: 2 INJECTION INTRAMUSCULAR; INTRAVENOUS at 08:27

## 2024-09-12 RX ADMIN — OXYCODONE HYDROCHLORIDE 5 MG: 5 TABLET ORAL at 15:51

## 2024-09-12 RX ADMIN — ENOXAPARIN SODIUM 30 MG: 100 INJECTION SUBCUTANEOUS at 10:42

## 2024-09-12 RX ADMIN — ONDANSETRON 4 MG: 2 INJECTION INTRAMUSCULAR; INTRAVENOUS at 01:31

## 2024-09-12 RX ADMIN — METOCLOPRAMIDE 10 MG: 5 INJECTION, SOLUTION INTRAMUSCULAR; INTRAVENOUS at 05:18

## 2024-09-12 RX ADMIN — ENALAPRIL MALEATE 2.5 MG: 2.5 TABLET ORAL at 21:06

## 2024-09-12 RX ADMIN — SODIUM CHLORIDE: 9 INJECTION, SOLUTION INTRAVENOUS at 21:01

## 2024-09-12 RX ADMIN — OXYCODONE HYDROCHLORIDE 10 MG: 5 TABLET ORAL at 22:29

## 2024-09-12 RX ADMIN — SODIUM CHLORIDE, PRESERVATIVE FREE 10 ML: 5 INJECTION INTRAVENOUS at 08:28

## 2024-09-12 RX ADMIN — OXYCODONE HYDROCHLORIDE 5 MG: 5 TABLET ORAL at 10:47

## 2024-09-12 RX ADMIN — TRAZODONE HYDROCHLORIDE 150 MG: 50 TABLET ORAL at 22:29

## 2024-09-12 ASSESSMENT — PAIN - FUNCTIONAL ASSESSMENT: PAIN_FUNCTIONAL_ASSESSMENT: PREVENTS OR INTERFERES SOME ACTIVE ACTIVITIES AND ADLS

## 2024-09-12 ASSESSMENT — PAIN SCALES - GENERAL
PAINLEVEL_OUTOF10: 4
PAINLEVEL_OUTOF10: 4
PAINLEVEL_OUTOF10: 5
PAINLEVEL_OUTOF10: 2
PAINLEVEL_OUTOF10: 7
PAINLEVEL_OUTOF10: 4

## 2024-09-12 ASSESSMENT — PAIN DESCRIPTION - PAIN TYPE: TYPE: ACUTE PAIN

## 2024-09-12 ASSESSMENT — PAIN DESCRIPTION - DESCRIPTORS
DESCRIPTORS: ACHING
DESCRIPTORS: ACHING

## 2024-09-12 ASSESSMENT — PAIN DESCRIPTION - ORIENTATION
ORIENTATION: LEFT
ORIENTATION: RIGHT

## 2024-09-12 ASSESSMENT — PAIN DESCRIPTION - LOCATION
LOCATION: HIP
LOCATION: HIP

## 2024-09-12 ASSESSMENT — PAIN DESCRIPTION - FREQUENCY: FREQUENCY: CONTINUOUS

## 2024-09-12 ASSESSMENT — PAIN DESCRIPTION - ONSET: ONSET: ON-GOING

## 2024-09-13 VITALS
DIASTOLIC BLOOD PRESSURE: 59 MMHG | HEIGHT: 64 IN | OXYGEN SATURATION: 95 % | RESPIRATION RATE: 14 BRPM | WEIGHT: 174.16 LBS | SYSTOLIC BLOOD PRESSURE: 132 MMHG | TEMPERATURE: 97.7 F | BODY MASS INDEX: 29.73 KG/M2 | HEART RATE: 50 BPM

## 2024-09-13 LAB
ALBUMIN SERPL-MCNC: 3.4 G/DL (ref 3.5–5.2)
ALBUMIN/GLOB SERPL: 2 {RATIO} (ref 1–2.5)
ALP SERPL-CCNC: 117 U/L (ref 35–104)
ALT SERPL-CCNC: 202 U/L (ref 10–35)
ANION GAP SERPL CALCULATED.3IONS-SCNC: 7 MMOL/L (ref 9–16)
AST SERPL-CCNC: 105 U/L (ref 10–35)
BASOPHILS # BLD: 0.04 K/UL (ref 0–0.2)
BASOPHILS NFR BLD: 1 % (ref 0–2)
BILIRUB SERPL-MCNC: 0.3 MG/DL (ref 0–1.2)
BUN SERPL-MCNC: 14 MG/DL (ref 8–23)
BUN/CREAT SERPL: 20 (ref 9–20)
CALCIUM SERPL-MCNC: 8.5 MG/DL (ref 8.6–10.4)
CHLORIDE SERPL-SCNC: 108 MMOL/L (ref 98–107)
CO2 SERPL-SCNC: 25 MMOL/L (ref 20–31)
CREAT SERPL-MCNC: 0.7 MG/DL (ref 0.5–0.9)
EOSINOPHIL # BLD: 0.22 K/UL (ref 0–0.44)
EOSINOPHILS RELATIVE PERCENT: 4 % (ref 1–4)
ERYTHROCYTE [DISTWIDTH] IN BLOOD BY AUTOMATED COUNT: 12.5 % (ref 11.8–14.4)
GFR, ESTIMATED: >90 ML/MIN/1.73M2
GLUCOSE BLD-MCNC: 132 MG/DL (ref 74–100)
GLUCOSE BLD-MCNC: 98 MG/DL (ref 74–100)
GLUCOSE SERPL-MCNC: 127 MG/DL (ref 74–99)
HCT VFR BLD AUTO: 33.9 % (ref 36.3–47.1)
HGB BLD-MCNC: 11.5 G/DL (ref 11.9–15.1)
IMM GRANULOCYTES # BLD AUTO: <0.03 K/UL (ref 0–0.3)
IMM GRANULOCYTES NFR BLD: 0 %
LYMPHOCYTES NFR BLD: 1.68 K/UL (ref 1.1–3.7)
LYMPHOCYTES RELATIVE PERCENT: 34 % (ref 24–43)
MCH RBC QN AUTO: 32.8 PG (ref 25.2–33.5)
MCHC RBC AUTO-ENTMCNC: 33.9 G/DL (ref 28.4–34.8)
MCV RBC AUTO: 96.6 FL (ref 82.6–102.9)
MONOCYTES NFR BLD: 0.5 K/UL (ref 0.1–1.2)
MONOCYTES NFR BLD: 10 % (ref 3–12)
NEUTROPHILS NFR BLD: 51 % (ref 36–65)
NEUTS SEG NFR BLD: 2.57 K/UL (ref 1.5–8.1)
NRBC BLD-RTO: 0 PER 100 WBC
PLATELET # BLD AUTO: 228 K/UL (ref 138–453)
PMV BLD AUTO: 9.5 FL (ref 8.1–13.5)
POTASSIUM SERPL-SCNC: 4.5 MMOL/L (ref 3.7–5.3)
PROT SERPL-MCNC: 5 G/DL (ref 6.6–8.7)
RBC # BLD AUTO: 3.51 M/UL (ref 3.95–5.11)
SODIUM SERPL-SCNC: 140 MMOL/L (ref 136–145)
WBC OTHER # BLD: 5 K/UL (ref 3.5–11.3)

## 2024-09-13 PROCEDURE — 6360000002 HC RX W HCPCS: Performed by: NURSE PRACTITIONER

## 2024-09-13 PROCEDURE — 85025 COMPLETE CBC W/AUTO DIFF WBC: CPT

## 2024-09-13 PROCEDURE — 96372 THER/PROPH/DIAG INJ SC/IM: CPT

## 2024-09-13 PROCEDURE — 94761 N-INVAS EAR/PLS OXIMETRY MLT: CPT

## 2024-09-13 PROCEDURE — 80053 COMPREHEN METABOLIC PANEL: CPT

## 2024-09-13 PROCEDURE — 82947 ASSAY GLUCOSE BLOOD QUANT: CPT

## 2024-09-13 PROCEDURE — 2580000003 HC RX 258: Performed by: INTERNAL MEDICINE

## 2024-09-13 PROCEDURE — 6370000000 HC RX 637 (ALT 250 FOR IP): Performed by: NURSE PRACTITIONER

## 2024-09-13 PROCEDURE — 36415 COLL VENOUS BLD VENIPUNCTURE: CPT

## 2024-09-13 PROCEDURE — G0378 HOSPITAL OBSERVATION PER HR: HCPCS

## 2024-09-13 PROCEDURE — 97161 PT EVAL LOW COMPLEX 20 MIN: CPT

## 2024-09-13 PROCEDURE — 96376 TX/PRO/DX INJ SAME DRUG ADON: CPT

## 2024-09-13 PROCEDURE — 6360000002 HC RX W HCPCS: Performed by: INTERNAL MEDICINE

## 2024-09-13 RX ORDER — MECLIZINE HYDROCHLORIDE 25 MG/1
25 TABLET ORAL 3 TIMES DAILY PRN
Qty: 15 TABLET | Refills: 0 | Status: SHIPPED | OUTPATIENT
Start: 2024-09-13 | End: 2024-09-23

## 2024-09-13 RX ORDER — ONDANSETRON 4 MG/1
4 TABLET, ORALLY DISINTEGRATING ORAL EVERY 8 HOURS PRN
Qty: 10 TABLET | Refills: 1 | Status: SHIPPED | OUTPATIENT
Start: 2024-09-13

## 2024-09-13 RX ORDER — OXYCODONE HYDROCHLORIDE 5 MG/1
10 TABLET ORAL EVERY 6 HOURS PRN
Status: DISCONTINUED | OUTPATIENT
Start: 2024-09-13 | End: 2024-09-13 | Stop reason: HOSPADM

## 2024-09-13 RX ORDER — MELOXICAM 7.5 MG/1
7.5 TABLET ORAL DAILY
Qty: 30 TABLET | Refills: 3 | Status: SHIPPED | OUTPATIENT
Start: 2024-09-14

## 2024-09-13 RX ORDER — TRAMADOL HYDROCHLORIDE 50 MG/1
50 TABLET ORAL EVERY 4 HOURS PRN
Qty: 18 TABLET | Refills: 0 | Status: SHIPPED | OUTPATIENT
Start: 2024-09-13 | End: 2024-09-16

## 2024-09-13 RX ADMIN — ENOXAPARIN SODIUM 30 MG: 100 INJECTION SUBCUTANEOUS at 08:13

## 2024-09-13 RX ADMIN — MELOXICAM 7.5 MG: 7.5 TABLET ORAL at 08:14

## 2024-09-13 RX ADMIN — ONDANSETRON 4 MG: 2 INJECTION INTRAMUSCULAR; INTRAVENOUS at 06:29

## 2024-09-13 RX ADMIN — SODIUM CHLORIDE, PRESERVATIVE FREE 10 ML: 5 INJECTION INTRAVENOUS at 08:14

## 2024-09-13 ASSESSMENT — PAIN SCALES - GENERAL
PAINLEVEL_OUTOF10: 0

## 2024-09-16 ENCOUNTER — CARE COORDINATION (OUTPATIENT)
Dept: CARE COORDINATION | Age: 64
End: 2024-09-16

## 2024-09-17 ENCOUNTER — CARE COORDINATION (OUTPATIENT)
Dept: CARE COORDINATION | Age: 64
End: 2024-09-17

## 2024-09-17 ENCOUNTER — HOSPITAL ENCOUNTER (OUTPATIENT)
Age: 64
Discharge: HOME | End: 2024-09-17
Payer: COMMERCIAL

## 2024-09-17 DIAGNOSIS — R79.89 ELEVATED LFTS: ICD-10-CM

## 2024-09-17 DIAGNOSIS — S76.812A STRAIN OF ILIOPSOAS MUSCLE, LEFT, INITIAL ENCOUNTER: Primary | ICD-10-CM

## 2024-09-17 LAB
ALBUMIN SERPL-MCNC: 4.3 G/DL (ref 3.5–5.2)
ALBUMIN/GLOB SERPL: 1.9 {RATIO} (ref 1–2.5)
ALP SERPL-CCNC: 119 U/L (ref 35–104)
ALT SERPL-CCNC: 96 U/L (ref 10–35)
ANION GAP SERPL CALCULATED.3IONS-SCNC: 9 MMOL/L (ref 9–16)
AST SERPL-CCNC: 32 U/L (ref 10–35)
BILIRUB SERPL-MCNC: 0.3 MG/DL (ref 0–1.2)
BUN SERPL-MCNC: 19 MG/DL (ref 8–23)
BUN/CREAT SERPL: 19 (ref 9–20)
CALCIUM SERPL-MCNC: 9.9 MG/DL (ref 8.6–10.4)
CHLORIDE SERPL-SCNC: 102 MMOL/L (ref 98–107)
CO2 SERPL-SCNC: 26 MMOL/L (ref 20–31)
CREAT SERPL-MCNC: 1 MG/DL (ref 0.5–0.9)
GFR, ESTIMATED: 63 ML/MIN/1.73M2
GLUCOSE SERPL-MCNC: 307 MG/DL (ref 74–99)
POTASSIUM SERPL-SCNC: 5.4 MMOL/L (ref 3.7–5.3)
PROT SERPL-MCNC: 6.5 G/DL (ref 6.6–8.7)
SODIUM SERPL-SCNC: 137 MMOL/L (ref 136–145)

## 2024-09-17 PROCEDURE — 80053 COMPREHEN METABOLIC PANEL: CPT

## 2024-09-17 PROCEDURE — 36415 COLL VENOUS BLD VENIPUNCTURE: CPT

## 2024-09-23 ENCOUNTER — OFFICE VISIT (OUTPATIENT)
Dept: PRIMARY CARE CLINIC | Age: 64
End: 2024-09-23

## 2024-09-23 VITALS
TEMPERATURE: 96.8 F | DIASTOLIC BLOOD PRESSURE: 62 MMHG | SYSTOLIC BLOOD PRESSURE: 140 MMHG | HEART RATE: 80 BPM | BODY MASS INDEX: 27.98 KG/M2 | OXYGEN SATURATION: 98 % | WEIGHT: 163 LBS

## 2024-09-23 DIAGNOSIS — R74.8 ELEVATED LIVER ENZYMES: ICD-10-CM

## 2024-09-23 DIAGNOSIS — S76.812A STRAIN OF ILIOPSOAS MUSCLE, LEFT, INITIAL ENCOUNTER: Primary | ICD-10-CM

## 2024-10-03 ENCOUNTER — HOSPITAL ENCOUNTER (OUTPATIENT)
Age: 64
Discharge: HOME | End: 2024-10-03
Payer: COMMERCIAL

## 2024-10-03 DIAGNOSIS — R74.8 ELEVATED LIVER ENZYMES: ICD-10-CM

## 2024-10-03 LAB
ALBUMIN SERPL-MCNC: 4.4 G/DL (ref 3.5–5.2)
ALBUMIN/GLOB SERPL: 1.8 {RATIO} (ref 1–2.5)
ALP SERPL-CCNC: 84 U/L (ref 35–104)
ALT SERPL-CCNC: 14 U/L (ref 10–35)
ANION GAP SERPL CALCULATED.3IONS-SCNC: 10 MMOL/L (ref 9–16)
AST SERPL-CCNC: 16 U/L (ref 10–35)
BILIRUB SERPL-MCNC: 0.4 MG/DL (ref 0–1.2)
BUN SERPL-MCNC: 27 MG/DL (ref 8–23)
BUN/CREAT SERPL: 25 (ref 9–20)
CALCIUM SERPL-MCNC: 10.1 MG/DL (ref 8.6–10.4)
CHLORIDE SERPL-SCNC: 102 MMOL/L (ref 98–107)
CO2 SERPL-SCNC: 25 MMOL/L (ref 20–31)
CREAT SERPL-MCNC: 1.1 MG/DL (ref 0.5–0.9)
GFR, ESTIMATED: 60 ML/MIN/1.73M2
GLUCOSE SERPL-MCNC: 188 MG/DL (ref 74–99)
POTASSIUM SERPL-SCNC: 5.3 MMOL/L (ref 3.7–5.3)
PROT SERPL-MCNC: 6.8 G/DL (ref 6.6–8.7)
SODIUM SERPL-SCNC: 137 MMOL/L (ref 136–145)

## 2024-10-03 PROCEDURE — 36415 COLL VENOUS BLD VENIPUNCTURE: CPT

## 2024-10-03 PROCEDURE — 80053 COMPREHEN METABOLIC PANEL: CPT

## 2025-01-06 ENCOUNTER — OFFICE VISIT (OUTPATIENT)
Dept: PRIMARY CARE CLINIC | Age: 65
End: 2025-01-06
Payer: COMMERCIAL

## 2025-01-06 VITALS
TEMPERATURE: 95.7 F | DIASTOLIC BLOOD PRESSURE: 52 MMHG | SYSTOLIC BLOOD PRESSURE: 110 MMHG | HEART RATE: 81 BPM | OXYGEN SATURATION: 97 % | BODY MASS INDEX: 26.95 KG/M2 | WEIGHT: 157 LBS

## 2025-01-06 DIAGNOSIS — Z12.12 ENCOUNTER FOR COLORECTAL CANCER SCREENING: ICD-10-CM

## 2025-01-06 DIAGNOSIS — Z01.419 ENCOUNTER FOR GYNECOLOGICAL EXAMINATION WITHOUT ABNORMAL FINDING: ICD-10-CM

## 2025-01-06 DIAGNOSIS — Z00.00 WELLNESS EXAMINATION: ICD-10-CM

## 2025-01-06 DIAGNOSIS — F98.8 ATTENTION DEFICIT DISORDER, UNSPECIFIED TYPE: ICD-10-CM

## 2025-01-06 DIAGNOSIS — E10.3593 TYPE 1 DIABETES MELLITUS WITH PROLIFERATIVE DIABETIC RETINOPATHY WITHOUT MACULAR EDEMA, BILATERAL (HCC): Primary | ICD-10-CM

## 2025-01-06 DIAGNOSIS — L90.0 LICHEN SCLEROSUS: ICD-10-CM

## 2025-01-06 DIAGNOSIS — Z12.11 ENCOUNTER FOR COLORECTAL CANCER SCREENING: ICD-10-CM

## 2025-01-06 PROCEDURE — 99214 OFFICE O/P EST MOD 30 MIN: CPT | Performed by: NURSE PRACTITIONER

## 2025-01-06 PROCEDURE — 3074F SYST BP LT 130 MM HG: CPT | Performed by: NURSE PRACTITIONER

## 2025-01-06 PROCEDURE — 3078F DIAST BP <80 MM HG: CPT | Performed by: NURSE PRACTITIONER

## 2025-01-06 RX ORDER — DEXTROAMPHETAMINE SACCHARATE, AMPHETAMINE ASPARTATE MONOHYDRATE, DEXTROAMPHETAMINE SULFATE AND AMPHETAMINE SULFATE 6.25; 6.25; 6.25; 6.25 MG/1; MG/1; MG/1; MG/1
25 CAPSULE, EXTENDED RELEASE ORAL DAILY
Qty: 30 CAPSULE | Refills: 0 | Status: SHIPPED
Start: 2025-01-06 | End: 2025-02-05

## 2025-01-06 RX ORDER — ATOMOXETINE 60 MG/1
60 CAPSULE ORAL DAILY
COMMUNITY
Start: 2025-01-03

## 2025-01-06 RX ORDER — INSULIN GLARGINE 100 [IU]/ML
26 INJECTION, SOLUTION SUBCUTANEOUS DAILY
COMMUNITY
Start: 2024-10-09 | End: 2025-01-06

## 2025-01-06 ASSESSMENT — PATIENT HEALTH QUESTIONNAIRE - PHQ9
SUM OF ALL RESPONSES TO PHQ QUESTIONS 1-9: 0
SUM OF ALL RESPONSES TO PHQ QUESTIONS 1-9: 0
1. LITTLE INTEREST OR PLEASURE IN DOING THINGS: NOT AT ALL
2. FEELING DOWN, DEPRESSED OR HOPELESS: NOT AT ALL
SUM OF ALL RESPONSES TO PHQ QUESTIONS 1-9: 0
SUM OF ALL RESPONSES TO PHQ QUESTIONS 1-9: 0
SUM OF ALL RESPONSES TO PHQ9 QUESTIONS 1 & 2: 0

## 2025-01-06 NOTE — PROGRESS NOTES
VISIT,PROCEDURE ONLY N/A 09/20/2018    CHOLECYSTECTOMY LAPAROSCOPIC ROBOTIC performed by Vibha Cook DO at [facility] OR    ROTATOR CUFF REPAIR Left 2009    TUBAL LIGATION  1993        Medications:       Prior to Admission medications    Medication Sig Start Date End Date Taking? Authorizing Provider   atomoxetine (STRATTERA) 60 MG capsule Take 1 capsule by mouth daily 1/3/25  Yes ProviderLucas MD   amphetamine-dextroamphetamine (ADDERALL XR) 25 MG extended release capsule Take 1 capsule by mouth daily for 30 days. Max Daily Amount: 25 mg 1/6/25 2/5/25 Yes Anahi Tapia APRN - CNP   meloxicam (MOBIC) 7.5 MG tablet Take 1 tablet by mouth daily 9/14/24  Yes Karrie Catherine APRN - CNP   Semaglutide-Weight Management (WEGOVY) 0.5 MG/0.5ML SOAJ SC injection Inject 0.5 mg into the skin every 7 days   Yes ProviderLucas MD   enalapril (VASOTEC) 2.5 MG tablet TAKE 1 TABLET BY MOUTH ONCE DAILY 7/22/24  Yes Anahi Tapia APRN - CNP   BIOTIN PO Take by mouth   Yes Lucas Garvin MD   traZODone (DESYREL) 100 MG tablet Take 1.5 tablets by mouth nightly as needed for Sleep 6/10/24  Yes Anahi Tapia APRN - CNP   dilTIAZem (CARDIZEM CD) 360 MG extended release capsule Take 1 capsule by mouth daily 4/26/24  Yes Anahi Tapia APRN - CNP   insulin aspart (NOVOLOG) 100 UNIT/ML injection vial INJECT VIA PUMP INSTRUCTION 3/28/22  Yes Anahi Tapia APRN - CNP   Continuous Blood Gluc Transmit (DEXCOM G6 TRANSMITTER) MISC 1 each by Other route daily 4/28/21  Yes Lucas Garvin MD   Bacillus Coagulans-Inulin (ALIGN PREBIOTIC-PROBIOTIC PO) Take 1 tablet by mouth daily   Yes Lucas Garvin MD   Psyllium (METAMUCIL PO) Take by mouth daily   Yes ProviderLucas MD        Allergies:       Bee venom and Sulfa antibiotics    Social History:     Tobacco:    reports that she has been smoking cigarettes. She started smoking about 46 years ago. She has a 4.5 pack-year smoking

## 2025-01-13 PROBLEM — R10.11 ABDOMINAL PAIN, RIGHT UPPER QUADRANT: Status: ACTIVE | Noted: 2025-01-13

## 2025-01-13 PROBLEM — Z96.41 TYPE 1 DIABETES MELLITUS WITH COMPLICATION, WITH LONG TERM CURRENT USE OF INSULIN PUMP (HCC): Chronic | Status: ACTIVE | Noted: 2025-01-13

## 2025-01-13 PROBLEM — R53.81 MALAISE AND FATIGUE: Status: ACTIVE | Noted: 2025-01-13

## 2025-01-13 PROBLEM — E10.8 TYPE 1 DIABETES MELLITUS WITH COMPLICATION, WITH LONG TERM CURRENT USE OF INSULIN PUMP (HCC): Chronic | Status: ACTIVE | Noted: 2025-01-13

## 2025-01-13 PROBLEM — R53.83 MALAISE AND FATIGUE: Status: ACTIVE | Noted: 2025-01-13

## 2025-01-16 ENCOUNTER — HOSPITAL ENCOUNTER (OUTPATIENT)
Age: 65
Discharge: HOME | End: 2025-01-16
Payer: COMMERCIAL

## 2025-01-16 DIAGNOSIS — R53.83 MALAISE AND FATIGUE: ICD-10-CM

## 2025-01-16 DIAGNOSIS — R53.81 MALAISE AND FATIGUE: ICD-10-CM

## 2025-01-16 LAB
ALBUMIN SERPL-MCNC: 4.3 G/DL (ref 3.5–5.2)
ALBUMIN/GLOB SERPL: 2 {RATIO} (ref 1–2.5)
ALP SERPL-CCNC: 82 U/L (ref 35–104)
ALT SERPL-CCNC: 15 U/L (ref 10–35)
ANION GAP SERPL CALCULATED.3IONS-SCNC: 10 MMOL/L (ref 9–16)
AST SERPL-CCNC: 19 U/L (ref 10–35)
BASOPHILS # BLD: 0.07 K/UL (ref 0–0.2)
BASOPHILS NFR BLD: 1 % (ref 0–2)
BILIRUB SERPL-MCNC: 0.3 MG/DL (ref 0–1.2)
BUN SERPL-MCNC: 21 MG/DL (ref 8–23)
BUN/CREAT SERPL: 21 (ref 9–20)
CALCIUM SERPL-MCNC: 9.6 MG/DL (ref 8.6–10.4)
CHLORIDE SERPL-SCNC: 101 MMOL/L (ref 98–107)
CO2 SERPL-SCNC: 26 MMOL/L (ref 20–31)
CREAT SERPL-MCNC: 1 MG/DL (ref 0.5–0.9)
EOSINOPHIL # BLD: 0.17 K/UL (ref 0–0.44)
EOSINOPHILS RELATIVE PERCENT: 2 % (ref 1–4)
ERYTHROCYTE [DISTWIDTH] IN BLOOD BY AUTOMATED COUNT: 12.4 % (ref 11.8–14.4)
GFR, ESTIMATED: 60 ML/MIN/1.73M2
GLUCOSE SERPL-MCNC: 145 MG/DL (ref 74–99)
HCT VFR BLD AUTO: 42.5 % (ref 36.3–47.1)
HGB BLD-MCNC: 14.4 G/DL (ref 11.9–15.1)
IMM GRANULOCYTES # BLD AUTO: 0.03 K/UL (ref 0–0.3)
IMM GRANULOCYTES NFR BLD: 0 %
LYMPHOCYTES NFR BLD: 1.9 K/UL (ref 1.1–3.7)
LYMPHOCYTES RELATIVE PERCENT: 25 % (ref 24–43)
MCH RBC QN AUTO: 32 PG (ref 25.2–33.5)
MCHC RBC AUTO-ENTMCNC: 33.9 G/DL (ref 28.4–34.8)
MCV RBC AUTO: 94.4 FL (ref 82.6–102.9)
MONOCYTES NFR BLD: 0.73 K/UL (ref 0.1–1.2)
MONOCYTES NFR BLD: 10 % (ref 3–12)
NEUTROPHILS NFR BLD: 62 % (ref 36–65)
NEUTS SEG NFR BLD: 4.72 K/UL (ref 1.5–8.1)
NRBC BLD-RTO: 0 PER 100 WBC
PLATELET # BLD AUTO: 332 K/UL (ref 138–453)
PMV BLD AUTO: 8.5 FL (ref 8.1–13.5)
POTASSIUM SERPL-SCNC: 5.2 MMOL/L (ref 3.7–5.3)
PROT SERPL-MCNC: 6.5 G/DL (ref 6.6–8.7)
RBC # BLD AUTO: 4.5 M/UL (ref 3.95–5.11)
SODIUM SERPL-SCNC: 137 MMOL/L (ref 136–145)
WBC OTHER # BLD: 7.6 K/UL (ref 3.5–11.3)

## 2025-01-16 PROCEDURE — 80053 COMPREHEN METABOLIC PANEL: CPT

## 2025-01-16 PROCEDURE — 36415 COLL VENOUS BLD VENIPUNCTURE: CPT

## 2025-01-16 PROCEDURE — 85025 COMPLETE CBC W/AUTO DIFF WBC: CPT

## 2025-01-20 ENCOUNTER — TELEPHONE (OUTPATIENT)
Dept: PRIMARY CARE CLINIC | Age: 65
End: 2025-01-20

## 2025-01-20 NOTE — TELEPHONE ENCOUNTER
Please call and request BP readings     I do see where she is also following with Dr. Burleson. If planning to switch PCP that is no problem, just please confirm with patient the need to cancel 5/2025 wellness. Thank you!

## 2025-01-21 ENCOUNTER — OFFICE VISIT (OUTPATIENT)
Dept: OBGYN | Age: 65
End: 2025-01-21
Payer: COMMERCIAL

## 2025-01-21 VITALS
BODY MASS INDEX: 26.63 KG/M2 | HEIGHT: 64 IN | SYSTOLIC BLOOD PRESSURE: 132 MMHG | DIASTOLIC BLOOD PRESSURE: 64 MMHG | WEIGHT: 156 LBS

## 2025-01-21 DIAGNOSIS — Z01.419 WOMEN'S ANNUAL ROUTINE GYNECOLOGICAL EXAMINATION: Primary | ICD-10-CM

## 2025-01-21 DIAGNOSIS — L90.0 LICHEN SCLEROSUS ET ATROPHICUS: ICD-10-CM

## 2025-01-21 PROCEDURE — G0101 CA SCREEN;PELVIC/BREAST EXAM: HCPCS | Performed by: OBSTETRICS & GYNECOLOGY

## 2025-01-21 RX ORDER — ESTRADIOL 10 UG/1
10 INSERT VAGINAL
Qty: 7.84 TABLET | Refills: 11 | Status: SHIPPED | OUTPATIENT
Start: 2025-01-23

## 2025-01-21 RX ORDER — ESTRADIOL 10 UG/1
10 INSERT VAGINAL DAILY
Qty: 5.88 TABLET | Refills: 0 | Status: SHIPPED | OUTPATIENT
Start: 2025-01-21 | End: 2025-02-11

## 2025-01-21 RX ORDER — CLOBETASOL PROPIONATE 0.5 MG/G
OINTMENT TOPICAL
Qty: 45 G | Refills: 2 | Status: SHIPPED | OUTPATIENT
Start: 2025-01-21

## 2025-01-21 ASSESSMENT — ENCOUNTER SYMPTOMS
SHORTNESS OF BREATH: 0
CONSTIPATION: 0
ABDOMINAL PAIN: 0
DIARRHEA: 0

## 2025-01-21 NOTE — TELEPHONE ENCOUNTER
All BP's were 130-132 over 60's in that range. Will send her Log when she gets home.   Patient is staying with you.

## 2025-01-21 NOTE — PROGRESS NOTES
YEARLY PHYSICAL    Date of service: 2025    Michael Mireles  Is a 64 y.o. female    PT's PCP is: Anahi Tapia, APRN - CNP     : 1960                                         Chaperone for Intimate Exam  Chaperone was offered as part of the rooming process. Patient declined and agrees to continue with exam without a chaperone.  Chaperone: na      Subjective:       No LMP recorded. Patient is postmenopausal.     Are your menses regular: not applicable    OB History   No obstetric history on file.        Social History     Tobacco Use   Smoking Status Some Days    Current packs/day: 0.00    Average packs/day: 0.1 packs/day for 45.2 years (4.5 ttl pk-yrs)    Types: Cigarettes    Start date: 1979    Last attempt to quit: 2024    Years since quittin.8   Smokeless Tobacco Never        Social History     Substance and Sexual Activity   Alcohol Use Yes    Comment: social       Family History   Problem Relation Age of Onset    Heart Attack Brother 50    Heart Disease Mother     Cancer Father        Any family history of breast or ovarian cancer: No    Any family history of blood clots: No      Allergies: Bee venom and Sulfa antibiotics      Current Outpatient Medications:     insulin glargine (LANTUS;BASAGLAR) 100 UNIT/ML injection pen, Inject 26 Units into the skin, Disp: , Rfl:     clobetasol (TEMOVATE) 0.05 % ointment, Apply topically 2 times daily., Disp: 45 g, Rfl: 2    Estradiol (VAGIFEM) 10 MCG TABS vaginal tablet, Place 1 tablet vaginally daily for 21 days, Disp: 5.88 tablet, Rfl: 0    [START ON 2025] Estradiol (VAGIFEM) 10 MCG TABS vaginal tablet, Place 1 tablet vaginally Twice a Week, Disp: 7.84 tablet, Rfl: 11    famotidine (PEPCID) 20 MG tablet, Take 1 tablet by mouth 2 times daily, Disp: 60 tablet, Rfl: 0    atomoxetine (STRATTERA) 60 MG capsule, Take 1 capsule by mouth daily, Disp: , Rfl:

## 2025-01-22 ENCOUNTER — HOSPITAL ENCOUNTER (OUTPATIENT)
Age: 65
Discharge: HOME | End: 2025-01-24
Payer: COMMERCIAL

## 2025-01-22 ENCOUNTER — HOSPITAL ENCOUNTER (OUTPATIENT)
Dept: NUCLEAR MEDICINE | Age: 65
Discharge: HOME | End: 2025-01-24
Payer: COMMERCIAL

## 2025-01-22 DIAGNOSIS — R07.9 CHEST PAIN, UNSPECIFIED TYPE: ICD-10-CM

## 2025-01-22 PROCEDURE — 93017 CV STRESS TEST TRACING ONLY: CPT

## 2025-01-22 PROCEDURE — A9500 TC99M SESTAMIBI: HCPCS | Performed by: NURSE PRACTITIONER

## 2025-01-22 PROCEDURE — 3430000000 HC RX DIAGNOSTIC RADIOPHARMACEUTICAL: Performed by: NURSE PRACTITIONER

## 2025-01-22 RX ORDER — TETRAKIS(2-METHOXYISOBUTYLISOCYANIDE)COPPER(I) TETRAFLUOROBORATE 1 MG/ML
30 INJECTION, POWDER, LYOPHILIZED, FOR SOLUTION INTRAVENOUS
Status: COMPLETED | OUTPATIENT
Start: 2025-01-22 | End: 2025-01-22

## 2025-01-22 RX ADMIN — Medication 30 MILLICURIE: at 10:15

## 2025-01-23 ENCOUNTER — HOSPITAL ENCOUNTER (OUTPATIENT)
Dept: NUCLEAR MEDICINE | Age: 65
Discharge: HOME | End: 2025-01-25
Payer: COMMERCIAL

## 2025-01-23 LAB
NUC STRESS EJECTION FRACTION: 67 %
STRESS BASELINE DIAS BP: 70 MMHG
STRESS BASELINE HR: 63 BPM
STRESS BASELINE ST DEPRESSION: 0 MM
STRESS BASELINE SYS BP: 122 MMHG
STRESS ESTIMATED WORKLOAD: 9.6 METS
STRESS EXERCISE DUR MIN: 7 MIN
STRESS EXERCISE DUR SEC: 42 SEC
STRESS PEAK DIAS BP: 50 MMHG
STRESS PEAK SYS BP: 168 MMHG
STRESS PERCENT HR ACHIEVED: 87 %
STRESS POST PEAK HR: 137 BPM
STRESS RATE PRESSURE PRODUCT: NORMAL BPM*MMHG
STRESS TARGET HR: 157 BPM
TID: 0.94

## 2025-01-23 PROCEDURE — 3430000000 HC RX DIAGNOSTIC RADIOPHARMACEUTICAL: Performed by: NURSE PRACTITIONER

## 2025-01-23 PROCEDURE — A9500 TC99M SESTAMIBI: HCPCS | Performed by: NURSE PRACTITIONER

## 2025-01-23 RX ORDER — TETRAKIS(2-METHOXYISOBUTYLISOCYANIDE)COPPER(I) TETRAFLUOROBORATE 1 MG/ML
30 INJECTION, POWDER, LYOPHILIZED, FOR SOLUTION INTRAVENOUS
Status: COMPLETED | OUTPATIENT
Start: 2025-01-23 | End: 2025-01-23

## 2025-01-23 RX ADMIN — Medication 30 MILLICURIE: at 12:58

## 2025-01-27 ENCOUNTER — OFFICE VISIT (OUTPATIENT)
Dept: CARDIOLOGY | Age: 65
End: 2025-01-27
Payer: COMMERCIAL

## 2025-01-27 VITALS
WEIGHT: 152.2 LBS | HEIGHT: 64 IN | HEART RATE: 63 BPM | OXYGEN SATURATION: 99 % | SYSTOLIC BLOOD PRESSURE: 117 MMHG | DIASTOLIC BLOOD PRESSURE: 70 MMHG | BODY MASS INDEX: 25.99 KG/M2 | RESPIRATION RATE: 18 BRPM

## 2025-01-27 DIAGNOSIS — I49.3 FREQUENT PVCS: Primary | ICD-10-CM

## 2025-01-27 DIAGNOSIS — R06.02 SOB (SHORTNESS OF BREATH): ICD-10-CM

## 2025-01-27 DIAGNOSIS — Z82.49 FAMILY HISTORY OF HEART DISEASE: ICD-10-CM

## 2025-01-27 DIAGNOSIS — I10 PRIMARY HYPERTENSION: ICD-10-CM

## 2025-01-27 DIAGNOSIS — I35.0 NONRHEUMATIC AORTIC VALVE STENOSIS: ICD-10-CM

## 2025-01-27 PROCEDURE — 99204 OFFICE O/P NEW MOD 45 MIN: CPT | Performed by: FAMILY MEDICINE

## 2025-01-27 PROCEDURE — 3078F DIAST BP <80 MM HG: CPT | Performed by: FAMILY MEDICINE

## 2025-01-27 PROCEDURE — 3074F SYST BP LT 130 MM HG: CPT | Performed by: FAMILY MEDICINE

## 2025-01-27 RX ORDER — METOPROLOL SUCCINATE 25 MG/1
25 TABLET, EXTENDED RELEASE ORAL DAILY
Qty: 90 TABLET | Refills: 3 | Status: SHIPPED | OUTPATIENT
Start: 2025-01-27

## 2025-01-27 NOTE — PROGRESS NOTES
I, Michela Schwartz CMA am scribing for and in the presence of DANICA العراقي MD.    Patient: Michael Mireles  : 1960  Date of Visit: 2025    REASON FOR VISIT / CONSULTATION: Establish Cardiologist (Pt is here to re-est care for having PVC daily, lasts seconds no set pattern, happens more with exertion, fatigue also, sob with exertion, Denies:CP, lightheaded/dizziness)    Dear Anahi Tapia, WILMER - CNP,    I had the pleasure of seeing Ms. Diaz is a 57 y.o. female in consultation today.     She was admitted on 10/22/2017 to the hospital with diabetic ketoacidosis. I had the pleasure of seeing her in my office for follow up today. Ms. Diaz denies any past medical history significant for coronary artery disease and denies any known history of cardiac problems. She reports a history of extra beats for about 10 years which was happening daily or when she was laying down on her right side. Her Holter monitor done in 2017 which had shown Frequent PVC's (12% total test duration), however after starting Cardizem, fortunately her repeat Holter monitor on 2/15/2018 showed a decreased in the Frequent of her PVC's to about 5% of the total test duration. She says that her mother was recently diagnosed with a 99% carotid stenosis as well coronary artery disease that is going to be undergoing bypass surgery. Ms. Mireles was hospitalized on 2018 due to sepsis and acute cholecystitis and during her stay also developed moderate heart failure as well short runs of nonsustained ventricular tachycardia during her stay in the hospital. She had stress test done on 2025  Patient exercised on Hema protocol for 7 minutes and 42 seconds, achieving 87% of the maximum predicted heart rate.  Normal heart rate and blood pressure response to exercise. No chest pain at peak exercise or during recovery. No definite ischemic ECG changes at peak exercise or during recovery. Significant stress-induced frequent PVCs

## 2025-02-04 LAB — DIABETIC RETINOPATHY: POSITIVE

## 2025-02-17 ENCOUNTER — HOSPITAL ENCOUNTER (OUTPATIENT)
Age: 65
Discharge: HOME | End: 2025-02-19
Attending: FAMILY MEDICINE
Payer: COMMERCIAL

## 2025-02-17 VITALS
SYSTOLIC BLOOD PRESSURE: 134 MMHG | BODY MASS INDEX: 26.31 KG/M2 | WEIGHT: 154.1 LBS | DIASTOLIC BLOOD PRESSURE: 63 MMHG | HEIGHT: 64 IN

## 2025-02-17 DIAGNOSIS — I10 PRIMARY HYPERTENSION: ICD-10-CM

## 2025-02-17 DIAGNOSIS — R06.02 SOB (SHORTNESS OF BREATH): ICD-10-CM

## 2025-02-17 DIAGNOSIS — I35.0 NONRHEUMATIC AORTIC VALVE STENOSIS: ICD-10-CM

## 2025-02-17 DIAGNOSIS — Z82.49 FAMILY HISTORY OF HEART DISEASE: ICD-10-CM

## 2025-02-17 DIAGNOSIS — I49.3 FREQUENT PVCS: ICD-10-CM

## 2025-02-17 LAB
ECHO AO ROOT DIAM: 1.8 CM
ECHO AO ROOT INDEX: 1.03 CM/M2
ECHO AO SINUS VALSALVA DIAM: 3 CM
ECHO AO SINUS VALSALVA INDEX: 1.71 CM/M2
ECHO AO ST JNCT DIAM: 2 CM
ECHO AV AREA PEAK VELOCITY: 1.7 CM2
ECHO AV AREA VTI: 1.7 CM2
ECHO AV AREA/BSA PEAK VELOCITY: 1 CM2/M2
ECHO AV AREA/BSA VTI: 1 CM2/M2
ECHO AV CUSP MM: 1.5 CM
ECHO AV MEAN GRADIENT: 8 MMHG
ECHO AV MEAN VELOCITY: 1.3 M/S
ECHO AV PEAK GRADIENT: 14 MMHG
ECHO AV PEAK VELOCITY: 1.9 M/S
ECHO AV VELOCITY RATIO: 0.53
ECHO AV VTI: 48.2 CM
ECHO BSA: 1.78 M2
ECHO BSA: 1.78 M2
ECHO EST RA PRESSURE: 3 MMHG
ECHO LA AREA 2C: 19.9 CM2
ECHO LA AREA 4C: 13.5 CM2
ECHO LA MAJOR AXIS: 4.4 CM
ECHO LA MINOR AXIS: 4.9 CM
ECHO LA VOL BP: 50 ML (ref 22–52)
ECHO LA VOL MOD A2C: 67 ML (ref 22–52)
ECHO LA VOL MOD A4C: 34 ML (ref 22–52)
ECHO LA VOL/BSA BIPLANE: 29 ML/M2 (ref 16–34)
ECHO LA VOLUME INDEX MOD A2C: 38 ML/M2 (ref 16–34)
ECHO LA VOLUME INDEX MOD A4C: 19 ML/M2 (ref 16–34)
ECHO LV E' LATERAL VELOCITY: 10.6 CM/S
ECHO LV EDV A2C: 82 ML
ECHO LV EDV A4C: 76 ML
ECHO LV EDV INDEX A4C: 43 ML/M2
ECHO LV EDV NDEX A2C: 47 ML/M2
ECHO LV EJECTION FRACTION A2C: 68 %
ECHO LV EJECTION FRACTION A4C: 60 %
ECHO LV EJECTION FRACTION BIPLANE: 63 % (ref 55–100)
ECHO LV ESV A2C: 27 ML
ECHO LV ESV A4C: 30 ML
ECHO LV ESV INDEX A2C: 15 ML/M2
ECHO LV ESV INDEX A4C: 17 ML/M2
ECHO LV FRACTIONAL SHORTENING: 34 % (ref 28–44)
ECHO LV INTERNAL DIMENSION DIASTOLE INDEX: 2.51 CM/M2
ECHO LV INTERNAL DIMENSION DIASTOLIC: 4.4 CM (ref 3.9–5.3)
ECHO LV INTERNAL DIMENSION SYSTOLIC INDEX: 1.66 CM/M2
ECHO LV INTERNAL DIMENSION SYSTOLIC: 2.9 CM
ECHO LV IVSD: 0.9 CM (ref 0.6–0.9)
ECHO LV MASS 2D: 128 G (ref 67–162)
ECHO LV MASS INDEX 2D: 73.2 G/M2 (ref 43–95)
ECHO LV POSTERIOR WALL DIASTOLIC: 0.9 CM (ref 0.6–0.9)
ECHO LV RELATIVE WALL THICKNESS RATIO: 0.41
ECHO LVOT AREA: 3.1 CM2
ECHO LVOT AV VTI INDEX: 0.54
ECHO LVOT DIAM: 2 CM
ECHO LVOT MEAN GRADIENT: 2 MMHG
ECHO LVOT PEAK GRADIENT: 4 MMHG
ECHO LVOT PEAK VELOCITY: 1 M/S
ECHO LVOT STROKE VOLUME INDEX: 46.8 ML/M2
ECHO LVOT SV: 82 ML
ECHO LVOT VTI: 26.1 CM
ECHO MV A VELOCITY: 0.77 M/S
ECHO MV E DECELERATION TIME (DT): 169 MS
ECHO MV E VELOCITY: 0.89 M/S
ECHO MV E/A RATIO: 1.16
ECHO MV E/E' LATERAL: 8.4
ECHO PV MAX VELOCITY: 0.8 M/S
ECHO PV PEAK GRADIENT: 2 MMHG
ECHO RIGHT VENTRICULAR SYSTOLIC PRESSURE (RVSP): 28 MMHG
ECHO TV REGURGITANT MAX VELOCITY: 2.51 M/S
ECHO TV REGURGITANT PEAK GRADIENT: 25 MMHG

## 2025-02-17 PROCEDURE — 93243 EXT ECG>48HR<7D SCAN A/R: CPT

## 2025-02-17 PROCEDURE — 93306 TTE W/DOPPLER COMPLETE: CPT

## 2025-02-17 PROCEDURE — 93306 TTE W/DOPPLER COMPLETE: CPT | Performed by: FAMILY MEDICINE

## 2025-02-18 ENCOUNTER — TELEPHONE (OUTPATIENT)
Dept: CARDIOLOGY | Age: 65
End: 2025-02-18

## 2025-02-18 NOTE — TELEPHONE ENCOUNTER
----- Message from Dr. Oumar Weaver MD sent at 2/17/2025 10:31 PM EST -----  Please let Ms. Mireles know that their recent test results are largely unremarkable and/or relatively normal for them. No further action is needed at this time. Please continue with your current care plan.

## 2025-02-20 RX ORDER — EPINEPHRINE 0.3 MG/.3ML
INJECTION SUBCUTANEOUS
Qty: 2 EACH | Refills: 0 | Status: SHIPPED | OUTPATIENT
Start: 2025-02-20

## 2025-02-25 ENCOUNTER — TELEPHONE (OUTPATIENT)
Dept: CARDIOLOGY | Age: 65
End: 2025-02-25

## 2025-02-25 LAB — ECHO BSA: 1.78 M2

## 2025-02-25 NOTE — TELEPHONE ENCOUNTER
----- Message from Dr. Oumar Weaver MD sent at 2/25/2025 12:29 AM EST -----  Please let Ms. Mireles know that their recent test results are largely unremarkable and/or relatively normal for them. No further action is needed at this time. Please continue with your current care plan.

## 2025-03-26 ENCOUNTER — HOSPITAL ENCOUNTER (OUTPATIENT)
Dept: WOMENS IMAGING | Age: 65
Discharge: HOME | End: 2025-03-28
Payer: COMMERCIAL

## 2025-03-26 ENCOUNTER — RESULTS FOLLOW-UP (OUTPATIENT)
Dept: PRIMARY CARE CLINIC | Age: 65
End: 2025-03-26

## 2025-03-26 VITALS — HEIGHT: 64 IN | WEIGHT: 150 LBS | BODY MASS INDEX: 25.61 KG/M2

## 2025-03-26 DIAGNOSIS — Z00.00 WELLNESS EXAMINATION: ICD-10-CM

## 2025-03-26 DIAGNOSIS — Z78.0 POST-MENOPAUSAL: ICD-10-CM

## 2025-03-26 DIAGNOSIS — R92.1 CALCIFICATION OF RIGHT BREAST: Primary | ICD-10-CM

## 2025-03-26 DIAGNOSIS — Z12.31 BREAST CANCER SCREENING BY MAMMOGRAM: ICD-10-CM

## 2025-03-26 PROCEDURE — 77067 SCR MAMMO BI INCL CAD: CPT

## 2025-03-26 PROCEDURE — 77080 DXA BONE DENSITY AXIAL: CPT

## 2025-03-27 ENCOUNTER — RESULTS FOLLOW-UP (OUTPATIENT)
Dept: PRIMARY CARE CLINIC | Age: 65
End: 2025-03-27

## 2025-03-27 DIAGNOSIS — M85.80 OSTEOPENIA, UNSPECIFIED LOCATION: Primary | ICD-10-CM

## 2025-04-14 ENCOUNTER — HOSPITAL ENCOUNTER (OUTPATIENT)
Dept: WOMENS IMAGING | Age: 65
Discharge: HOME | End: 2025-04-16
Payer: COMMERCIAL

## 2025-04-14 DIAGNOSIS — R92.1 CALCIFICATION OF RIGHT BREAST: ICD-10-CM

## 2025-04-14 PROCEDURE — G0279 TOMOSYNTHESIS, MAMMO: HCPCS

## 2025-04-15 ENCOUNTER — RESULTS FOLLOW-UP (OUTPATIENT)
Dept: PRIMARY CARE CLINIC | Age: 65
End: 2025-04-15

## 2025-04-15 DIAGNOSIS — R92.8 ABNORMAL MAMMOGRAM OF RIGHT BREAST: Primary | ICD-10-CM

## 2025-04-15 DIAGNOSIS — R92.1 CALCIFICATION OF RIGHT BREAST: ICD-10-CM

## 2025-04-28 ENCOUNTER — RESULTS FOLLOW-UP (OUTPATIENT)
Dept: PRIMARY CARE CLINIC | Age: 65
End: 2025-04-28

## 2025-04-28 ENCOUNTER — HOSPITAL ENCOUNTER (OUTPATIENT)
Age: 65
Discharge: HOME | End: 2025-04-28
Payer: COMMERCIAL

## 2025-04-28 ENCOUNTER — HOSPITAL ENCOUNTER (OUTPATIENT)
Dept: MRI IMAGING | Age: 65
Discharge: HOME | End: 2025-04-30
Payer: COMMERCIAL

## 2025-04-28 DIAGNOSIS — R92.8 ABNORMAL MAMMOGRAM OF RIGHT BREAST: ICD-10-CM

## 2025-04-28 DIAGNOSIS — R92.1 BREAST CALCIFICATIONS ON MAMMOGRAM: Primary | ICD-10-CM

## 2025-04-28 DIAGNOSIS — R92.1 CALCIFICATION OF RIGHT BREAST: ICD-10-CM

## 2025-04-28 LAB
CREAT SERPL-MCNC: 0.9 MG/DL (ref 0.5–0.9)
GFR, ESTIMATED: 72 ML/MIN/1.73M2

## 2025-04-28 PROCEDURE — 6360000004 HC RX CONTRAST MEDICATION: Performed by: NURSE PRACTITIONER

## 2025-04-28 PROCEDURE — 82565 ASSAY OF CREATININE: CPT

## 2025-04-28 PROCEDURE — C8908 MRI W/O FOL W/CONT, BREAST,: HCPCS

## 2025-04-28 PROCEDURE — 36415 COLL VENOUS BLD VENIPUNCTURE: CPT

## 2025-04-28 PROCEDURE — A9579 GAD-BASE MR CONTRAST NOS,1ML: HCPCS | Performed by: NURSE PRACTITIONER

## 2025-04-28 RX ADMIN — GADOTERIDOL 13 ML: 279.3 INJECTION, SOLUTION INTRAVENOUS at 11:19

## 2025-04-29 RX ORDER — DILTIAZEM HYDROCHLORIDE 360 MG/1
CAPSULE, EXTENDED RELEASE ORAL DAILY
Qty: 90 CAPSULE | Refills: 3 | Status: SHIPPED | OUTPATIENT
Start: 2025-04-29

## 2025-05-05 LAB
ESTIMATED AVERAGE GLUCOSE: NORMAL
HBA1C MFR BLD: 6.4 %

## 2025-05-07 ENCOUNTER — RESULTS FOLLOW-UP (OUTPATIENT)
Dept: PRIMARY CARE CLINIC | Age: 65
End: 2025-05-07

## 2025-05-07 ENCOUNTER — HOSPITAL ENCOUNTER (OUTPATIENT)
Age: 65
Discharge: HOME | End: 2025-05-07
Payer: COMMERCIAL

## 2025-05-07 DIAGNOSIS — Z00.00 WELLNESS EXAMINATION: ICD-10-CM

## 2025-05-07 LAB
25(OH)D3 SERPL-MCNC: 52.1 NG/ML (ref 30–100)
ALT SERPL-CCNC: 18 U/L (ref 10–35)
ANION GAP SERPL CALCULATED.3IONS-SCNC: 10 MMOL/L (ref 9–16)
AST SERPL-CCNC: 20 U/L (ref 10–35)
BUN SERPL-MCNC: 22 MG/DL (ref 8–23)
BUN/CREAT SERPL: 22 (ref 9–20)
CALCIUM SERPL-MCNC: 11.5 MG/DL (ref 8.6–10.4)
CHLORIDE SERPL-SCNC: 101 MMOL/L (ref 98–107)
CHOLEST SERPL-MCNC: 196 MG/DL (ref 0–199)
CHOLESTEROL/HDL RATIO: 1.8
CO2 SERPL-SCNC: 29 MMOL/L (ref 20–31)
CREAT SERPL-MCNC: 1 MG/DL (ref 0.5–0.9)
ERYTHROCYTE [DISTWIDTH] IN BLOOD BY AUTOMATED COUNT: 12.3 % (ref 11.8–14.4)
GFR, ESTIMATED: 63 ML/MIN/1.73M2
GLUCOSE SERPL-MCNC: 112 MG/DL (ref 74–99)
HCT VFR BLD AUTO: 45.3 % (ref 36.3–47.1)
HDLC SERPL-MCNC: 109 MG/DL
HGB BLD-MCNC: 14.7 G/DL (ref 11.9–15.1)
LDLC SERPL CALC-MCNC: 75 MG/DL (ref 0–100)
MCH RBC QN AUTO: 31.3 PG (ref 25.2–33.5)
MCHC RBC AUTO-ENTMCNC: 32.5 G/DL (ref 28.4–34.8)
MCV RBC AUTO: 96.6 FL (ref 82.6–102.9)
NRBC BLD-RTO: 0 PER 100 WBC
PLATELET # BLD AUTO: 332 K/UL (ref 138–453)
PMV BLD AUTO: 8.9 FL (ref 8.1–13.5)
POTASSIUM SERPL-SCNC: 5.7 MMOL/L (ref 3.7–5.3)
RBC # BLD AUTO: 4.69 M/UL (ref 3.95–5.11)
SODIUM SERPL-SCNC: 140 MMOL/L (ref 136–145)
TRIGL SERPL-MCNC: 60 MG/DL
TSH SERPL DL<=0.05 MIU/L-ACNC: 1.54 UIU/ML (ref 0.27–4.2)
VLDLC SERPL CALC-MCNC: 12 MG/DL (ref 1–30)
WBC OTHER # BLD: 8.4 K/UL (ref 3.5–11.3)

## 2025-05-07 PROCEDURE — 84460 ALANINE AMINO (ALT) (SGPT): CPT

## 2025-05-07 PROCEDURE — 84450 TRANSFERASE (AST) (SGOT): CPT

## 2025-05-07 PROCEDURE — 82306 VITAMIN D 25 HYDROXY: CPT

## 2025-05-07 PROCEDURE — 85027 COMPLETE CBC AUTOMATED: CPT

## 2025-05-07 PROCEDURE — 84443 ASSAY THYROID STIM HORMONE: CPT

## 2025-05-07 PROCEDURE — 36415 COLL VENOUS BLD VENIPUNCTURE: CPT

## 2025-05-07 PROCEDURE — 80048 BASIC METABOLIC PNL TOTAL CA: CPT

## 2025-05-07 PROCEDURE — 80061 LIPID PANEL: CPT

## 2025-05-08 ENCOUNTER — OFFICE VISIT (OUTPATIENT)
Dept: PRIMARY CARE CLINIC | Age: 65
End: 2025-05-08
Payer: COMMERCIAL

## 2025-05-08 VITALS
BODY MASS INDEX: 25.75 KG/M2 | SYSTOLIC BLOOD PRESSURE: 110 MMHG | OXYGEN SATURATION: 98 % | WEIGHT: 150 LBS | DIASTOLIC BLOOD PRESSURE: 62 MMHG | HEART RATE: 59 BPM | TEMPERATURE: 95.9 F

## 2025-05-08 DIAGNOSIS — E83.52 HYPERCALCEMIA: ICD-10-CM

## 2025-05-08 DIAGNOSIS — Z23 NEED FOR VACCINATION: ICD-10-CM

## 2025-05-08 DIAGNOSIS — Z00.00 WELLNESS EXAMINATION: Primary | ICD-10-CM

## 2025-05-08 DIAGNOSIS — N18.31 STAGE 3A CHRONIC KIDNEY DISEASE (HCC): ICD-10-CM

## 2025-05-08 DIAGNOSIS — E10.3593 TYPE 1 DIABETES MELLITUS WITH PROLIFERATIVE DIABETIC RETINOPATHY WITHOUT MACULAR EDEMA, BILATERAL (HCC): ICD-10-CM

## 2025-05-08 PROBLEM — R53.83 MALAISE AND FATIGUE: Status: RESOLVED | Noted: 2025-01-13 | Resolved: 2025-05-08

## 2025-05-08 PROBLEM — K59.09 OTHER CONSTIPATION: Status: RESOLVED | Noted: 2018-09-21 | Resolved: 2025-05-08

## 2025-05-08 PROBLEM — R10.11 ABDOMINAL PAIN, RIGHT UPPER QUADRANT: Status: RESOLVED | Noted: 2025-01-13 | Resolved: 2025-05-08

## 2025-05-08 PROBLEM — R53.81 MALAISE AND FATIGUE: Status: RESOLVED | Noted: 2025-01-13 | Resolved: 2025-05-08

## 2025-05-08 PROCEDURE — 90471 IMMUNIZATION ADMIN: CPT | Performed by: NURSE PRACTITIONER

## 2025-05-08 PROCEDURE — 3074F SYST BP LT 130 MM HG: CPT | Performed by: NURSE PRACTITIONER

## 2025-05-08 PROCEDURE — 99396 PREV VISIT EST AGE 40-64: CPT | Performed by: NURSE PRACTITIONER

## 2025-05-08 PROCEDURE — 90677 PCV20 VACCINE IM: CPT | Performed by: NURSE PRACTITIONER

## 2025-05-08 PROCEDURE — 3078F DIAST BP <80 MM HG: CPT | Performed by: NURSE PRACTITIONER

## 2025-05-08 RX ORDER — EPINEPHRINE 0.3 MG/.3ML
INJECTION SUBCUTANEOUS
Qty: 2 EACH | Refills: 0 | Status: SHIPPED | OUTPATIENT
Start: 2025-05-08

## 2025-05-08 NOTE — PROGRESS NOTES
Name: Michael Mireles  : 1960         Chief Complaint:     Chief Complaint   Patient presents with    Annual Exam       History of Present Illness:      Michael Mireles is a 64 y.o.  female who presents with Annual Exam      HPI    Wellness:  She admits well balanced diet. For exercise she notes walking and weight resistance exercises. She admits routine eye exams. She admits routine dental exams. She is vaccinated for covid, most recent vaccine . She is UTD tetanus vaccine. She is not UTD pneumococcal vaccine. She is not UTD shingles vaccine. She is UTD influenza vaccine. Most recent colorectal cancer screening was colonoscopy with Dr. Thibodeaux 2025, records not avaiable for review. She admits family hx of colorectal cancer (father). Most recent mammogram 2025 with recommended repeat 6 months. She denies family history of breast cancer. Most recent pap smear 2020. [facility] Smoking status: \"occasional\", several times per week.  Most recent DEXA scan 3/2025 showed osteopenia. She is supplementing with calcium (dose unknown) and vitamin D.     Past Medical History:     Past Medical History:   Diagnosis Date    ADHD     Bacteremia due to Escherichia coli 2018    Degenerative disc disease, cervical     Diabetes mellitus (HCC)     Has insulin pump    Diabetic ketoacidosis associated with type 1 diabetes mellitus (HCC) 2017    H/O cardiovascular stress test 2017    Largerly normal moyocardial perfusion imaging with a soft tissue artifact, but without evidence of significant myocardial ischemia or infarction. EF 66%. The pt Flores treadmill score is 4 which correlates with a low/intermiediate risk for CAD.     History of Holter monitoring 2018    Very frequent PVC's. No symptoms were reported. Very frequent premature ventricular ectopic beats total 5610 consisting of 5610 isolated PVC's.     History of Holter monitoring 02/15/2018    Very frequent PVC's. No

## 2025-05-08 NOTE — PATIENT INSTRUCTIONS
conditions or other risk factors who have not already received pneumococcal conjugate vaccine should receive pneumococcal conjugate vaccine.  Adults 65 years or older who have not previously received pneumococcal conjugate vaccine should receive pneumococcal conjugate vaccine.  Some people with certain medical conditions are also recommended to receive pneumococcal polysaccharide vaccine (a different type of pneumococcal vaccine, known as PPSV23). Some adults who have previously received a pneumococcal conjugate vaccine may be recommended to receive another pneumococcal conjugate vaccine.  Talk with your health care provider  Tell your vaccination provider if the person getting the vaccine:  Has had an allergic reaction after a previous dose of any type of pneumococcal conjugate vaccine (PCV13, PCV15, PCV20, or an earlier pneumococcal conjugate vaccine known as PCV7), or to any vaccine containing diphtheria toxoid (for example, DTaP), or has any severe, lifethreatening allergies  In some cases, your health care provider may decide to postpone pneumococcal conjugate vaccination until a future visit.  People with minor illnesses, such as a cold, may be vaccinated. People who are moderately or severely ill should usually wait until they recover.  Your health care provider can give you more information.  Risks of a vaccine reaction  Redness, swelling, pain, or tenderness where the shot is given, and fever, loss of appetite, fussiness (irritability), feeling tired, headache, muscle aches, joint pain, and chills can happen after pneumococcal conjugate vaccination.  Young children may be at increased risk for seizures caused by fever after a pneumococcal conjugate vaccine if it is administered at the same time as inactivated influenza vaccine. Ask your health care provider for more information.  People sometimes faint after medical procedures, including vaccination. Tell your provider if you feel dizzy or have vision

## 2025-07-22 RX ORDER — ENALAPRIL MALEATE 2.5 MG/1
2.5 TABLET ORAL DAILY
Qty: 90 TABLET | Refills: 3 | Status: SHIPPED | OUTPATIENT
Start: 2025-07-22

## 2025-08-14 ENCOUNTER — OFFICE VISIT (OUTPATIENT)
Dept: PRIMARY CARE CLINIC | Age: 65
End: 2025-08-14
Payer: COMMERCIAL

## 2025-08-14 VITALS
OXYGEN SATURATION: 96 % | DIASTOLIC BLOOD PRESSURE: 60 MMHG | WEIGHT: 148 LBS | BODY MASS INDEX: 25.4 KG/M2 | SYSTOLIC BLOOD PRESSURE: 120 MMHG | HEART RATE: 69 BPM | TEMPERATURE: 97.5 F

## 2025-08-14 DIAGNOSIS — E10.3593 TYPE 1 DIABETES MELLITUS WITH PROLIFERATIVE DIABETIC RETINOPATHY WITHOUT MACULAR EDEMA, BILATERAL (HCC): Primary | ICD-10-CM

## 2025-08-14 DIAGNOSIS — G47.00 INSOMNIA, UNSPECIFIED TYPE: ICD-10-CM

## 2025-08-14 DIAGNOSIS — I10 PRIMARY HYPERTENSION: ICD-10-CM

## 2025-08-14 DIAGNOSIS — E87.5 HYPERKALEMIA: ICD-10-CM

## 2025-08-14 PROCEDURE — 99214 OFFICE O/P EST MOD 30 MIN: CPT | Performed by: NURSE PRACTITIONER

## 2025-08-14 PROCEDURE — G2211 COMPLEX E/M VISIT ADD ON: HCPCS | Performed by: NURSE PRACTITIONER

## 2025-08-14 PROCEDURE — 3074F SYST BP LT 130 MM HG: CPT | Performed by: NURSE PRACTITIONER

## 2025-08-14 PROCEDURE — 3078F DIAST BP <80 MM HG: CPT | Performed by: NURSE PRACTITIONER

## 2025-08-14 RX ORDER — DEXTROAMPHETAMINE SACCHARATE, AMPHETAMINE ASPARTATE, DEXTROAMPHETAMINE SULFATE AND AMPHETAMINE SULFATE 7.5; 7.5; 7.5; 7.5 MG/1; MG/1; MG/1; MG/1
30 TABLET ORAL DAILY
COMMUNITY

## 2025-08-14 RX ORDER — TRAZODONE HYDROCHLORIDE 100 MG/1
150 TABLET ORAL NIGHTLY PRN
Qty: 90 TABLET | Refills: 3 | Status: SHIPPED | OUTPATIENT
Start: 2025-08-14

## 2025-09-04 ENCOUNTER — HOSPITAL ENCOUNTER (OUTPATIENT)
Dept: LAB | Age: 65
Discharge: HOME | End: 2025-09-04
Payer: COMMERCIAL

## 2025-09-04 DIAGNOSIS — E10.3593 TYPE 1 DIABETES MELLITUS WITH PROLIFERATIVE DIABETIC RETINOPATHY WITHOUT MACULAR EDEMA, BILATERAL (HCC): ICD-10-CM

## 2025-09-04 DIAGNOSIS — E87.5 HYPERKALEMIA: ICD-10-CM

## 2025-09-04 LAB
ANION GAP SERPL CALCULATED.3IONS-SCNC: 12 MMOL/L (ref 9–16)
BUN SERPL-MCNC: 22 MG/DL (ref 8–23)
BUN/CREAT SERPL: 24 (ref 9–20)
CALCIUM SERPL-MCNC: 9.3 MG/DL (ref 8.6–10.4)
CHLORIDE SERPL-SCNC: 105 MMOL/L (ref 98–107)
CO2 SERPL-SCNC: 25 MMOL/L (ref 20–31)
CREAT SERPL-MCNC: 0.9 MG/DL (ref 0.5–0.9)
CREAT UR-MCNC: 277 MG/DL (ref 28–217)
GFR, ESTIMATED: 68 ML/MIN/1.73M2
GLUCOSE SERPL-MCNC: 121 MG/DL (ref 74–99)
MICROALBUMIN UR-MCNC: 31 MG/L (ref 0–20)
MICROALBUMIN/CREAT UR-RTO: 11 MCG/MG CREAT (ref 0–25)
POTASSIUM SERPL-SCNC: 4.8 MMOL/L (ref 3.7–5.3)
SODIUM SERPL-SCNC: 142 MMOL/L (ref 136–145)

## 2025-09-04 PROCEDURE — 80048 BASIC METABOLIC PNL TOTAL CA: CPT

## 2025-09-04 PROCEDURE — 36415 COLL VENOUS BLD VENIPUNCTURE: CPT

## 2025-09-04 PROCEDURE — 82570 ASSAY OF URINE CREATININE: CPT

## 2025-09-04 PROCEDURE — 82043 UR ALBUMIN QUANTITATIVE: CPT

## (undated) DEVICE — FENESTRATED BIPOLAR FORCEPS: Brand: ENDOWRIST;DAVINCI SI

## (undated) DEVICE — LARGE HEM-O-LOK CLIP APPLIER: Brand: ENDOWRIST;DAVINCI SI

## (undated) DEVICE — SOLUTION ANTIFOG VIS SYS CLEARIFY LAPSCP

## (undated) DEVICE — GOWN,AURORA,NONRNF,XL,30/CS: Brand: MEDLINE

## (undated) DEVICE — SET EXTN TBNG MINIBOR 20IN

## (undated) DEVICE — Device

## (undated) DEVICE — TROCAR ENDOSCP L100MM DIA5MM BLDELSS STBL SL THRD OPT VW

## (undated) DEVICE — SOLUTION IV 1000ML 0.9% SOD CHL FOR IRRIG PLAS CONT

## (undated) DEVICE — PAIN TRAY: Brand: MEDLINE INDUSTRIES, INC.

## (undated) DEVICE — SHEET,DRAPE,53X77,STERILE: Brand: MEDLINE

## (undated) DEVICE — SKIN AFFIX SURG ADHESIVE 72/CS 0.55ML: Brand: MEDLINE

## (undated) DEVICE — AVANOS* TUOHY EPIDURAL NEEDLE: Brand: AVANOS

## (undated) DEVICE — OBTURATOR ROBOTIC DIA8MM BLDELSS ENDOSCP DISP DA VINCI SI

## (undated) DEVICE — CANNULA ORAL NSL AD CO2 N INTUB O2 DEL DISP TRU LNK

## (undated) DEVICE — BAG SPEC LAP H6IN DIA3IN 250ML 10 12MM CANN ATTCH MEM WIRE

## (undated) DEVICE — KIT DRP 3 ARM ACC DISP ENDOWRIST DA VINCI SI

## (undated) DEVICE — SUTURE SZ 0 27IN 5/8 CIR UR-6  TAPER PT VIOLET ABSRB VICRYL J603H

## (undated) DEVICE — TROCAR ENDOSCP SHFT L150MM DIA12MM BLDELSS ENDOPATH XCEL

## (undated) DEVICE — SUTURE MCRYL SZ 4-0 L27IN ABSRB UD L19MM PS-2 1/2 CIR PRIM Y426H

## (undated) DEVICE — SOLUTION IV IRRIG POUR BRL 0.9% SODIUM CHL 2F7124

## (undated) DEVICE — Z DUP USE 2641840 CLIP INT L POLYMER LOK LIG HEM O LOK

## (undated) DEVICE — TOWEL,OR,DSP,ST,NATURAL,DLX,4/PK,20PK/CS: Brand: MEDLINE

## (undated) DEVICE — AVANOS* UNIVERSAL BLOCK TRAYS: Brand: AVANOS

## (undated) DEVICE — PENCIL ES L3M BTTN SWCH HOLSTER W/ BLDE ELECTRD EDGE

## (undated) DEVICE — GLOVE ORANGE PI 7   MSG9070

## (undated) DEVICE — PERMANENT CAUTERY HOOK: Brand: ENDOWRIST;DAVINCI SI

## (undated) DEVICE — MEDI-VAC NON-CONDUCTIVE TUBING7MM X 30.5 (100FT): Brand: CARDINAL HEALTH

## (undated) DEVICE — PLUMEPORT LAPAROSCOPIC SMOKE FILTRATION DEVICE: Brand: PLUMEPORT ACTIV